# Patient Record
Sex: MALE | Race: BLACK OR AFRICAN AMERICAN | Employment: FULL TIME | URBAN - METROPOLITAN AREA
[De-identification: names, ages, dates, MRNs, and addresses within clinical notes are randomized per-mention and may not be internally consistent; named-entity substitution may affect disease eponyms.]

---

## 2018-09-28 PROBLEM — E66.01 SEVERE OBESITY (BMI 35.0-39.9): Status: ACTIVE | Noted: 2018-09-28

## 2020-01-23 ENCOUNTER — HOSPITAL ENCOUNTER (OUTPATIENT)
Dept: GENERAL RADIOLOGY | Age: 65
Discharge: HOME | End: 2020-01-23
Payer: COMMERCIAL

## 2020-01-23 DIAGNOSIS — R68.3 CLUBBING OF FINGERS: ICD-10-CM

## 2020-01-23 DIAGNOSIS — Z87.891 HISTORY OF SMOKING: ICD-10-CM

## 2020-01-23 PROCEDURE — 71046 X-RAY EXAM CHEST 2 VIEWS: CPT

## 2020-01-24 NOTE — PROGRESS NOTES
Talked to pt and informed him, per Dr. Ariela Beard, some abnormalities noted on CXR; schedule ov to discuss.   Pt is scheduled for an appt w/ Dr. Ariela Beard Thursday, January 30th @ 3:20pm.

## 2020-01-30 PROBLEM — R68.3 CLUBBING OF FINGERS: Status: ACTIVE | Noted: 2020-01-30

## 2020-01-30 PROBLEM — Z87.891 HISTORY OF SMOKING 25-50 PACK YEARS: Status: ACTIVE | Noted: 2020-01-30

## 2020-02-06 ENCOUNTER — HOSPITAL ENCOUNTER (OUTPATIENT)
Dept: CT IMAGING | Age: 65
Discharge: HOME | End: 2020-02-06
Attending: INTERNAL MEDICINE
Payer: COMMERCIAL

## 2020-02-06 DIAGNOSIS — Z87.891 HISTORY OF SMOKING 25-50 PACK YEARS: ICD-10-CM

## 2020-02-06 DIAGNOSIS — R68.3 CLUBBING OF FINGERS: ICD-10-CM

## 2020-02-06 DIAGNOSIS — J84.10: ICD-10-CM

## 2020-02-06 LAB — CREAT BLD-MCNC: 1.2 MG/DL (ref 0.8–1.5)

## 2020-02-06 PROCEDURE — 82565 ASSAY OF CREATININE: CPT

## 2020-02-06 PROCEDURE — 71260 CT THORAX DX C+: CPT

## 2020-02-06 PROCEDURE — 74011000258 HC RX REV CODE- 258: Performed by: INTERNAL MEDICINE

## 2020-02-06 PROCEDURE — 74011636320 HC RX REV CODE- 636/320: Performed by: INTERNAL MEDICINE

## 2020-02-06 RX ORDER — SODIUM CHLORIDE 0.9 % (FLUSH) 0.9 %
10 SYRINGE (ML) INJECTION
Status: COMPLETED | OUTPATIENT
Start: 2020-02-06 | End: 2020-02-06

## 2020-02-06 RX ADMIN — Medication 10 ML: at 07:29

## 2020-02-06 RX ADMIN — IOPAMIDOL 80 ML: 755 INJECTION, SOLUTION INTRAVENOUS at 07:29

## 2020-02-06 RX ADMIN — SODIUM CHLORIDE 100 ML: 900 INJECTION, SOLUTION INTRAVENOUS at 07:29

## 2020-02-06 NOTE — PROGRESS NOTES
CT chest revealed extensive bronchiectasis, groundglass opacities; he has upcoming appointment with Dr. Forest Abbott, Pulmonary 2/22/20, and needs to keep that appointment.

## 2020-02-20 ENCOUNTER — HOSPITAL ENCOUNTER (OUTPATIENT)
Dept: LAB | Age: 65
Discharge: HOME | End: 2020-02-20
Payer: COMMERCIAL

## 2020-02-20 DIAGNOSIS — J84.9 ILD (INTERSTITIAL LUNG DISEASE) (HCC): ICD-10-CM

## 2020-02-20 LAB
CK SERPL-CCNC: 169 U/L (ref 21–215)
ERYTHROCYTE [SEDIMENTATION RATE] IN BLOOD: 13 MM/HR (ref 0–20)

## 2020-02-20 PROCEDURE — 86606 ASPERGILLUS ANTIBODY: CPT

## 2020-02-20 PROCEDURE — 83520 IMMUNOASSAY QUANT NOS NONAB: CPT

## 2020-02-20 PROCEDURE — 86235 NUCLEAR ANTIGEN ANTIBODY: CPT

## 2020-02-20 PROCEDURE — 86430 RHEUMATOID FACTOR TEST QUAL: CPT

## 2020-02-20 PROCEDURE — 82550 ASSAY OF CK (CPK): CPT

## 2020-02-20 PROCEDURE — 86431 RHEUMATOID FACTOR QUANT: CPT

## 2020-02-20 PROCEDURE — 36415 COLL VENOUS BLD VENIPUNCTURE: CPT

## 2020-02-20 PROCEDURE — 86602 ANTINOMYCES ANTIBODY: CPT

## 2020-02-20 PROCEDURE — 86200 CCP ANTIBODY: CPT

## 2020-02-20 PROCEDURE — 85652 RBC SED RATE AUTOMATED: CPT

## 2020-02-21 LAB
CCP IGA+IGG SERPL IA-ACNC: 10 UNITS (ref 0–19)
ENA RNP AB SER-ACNC: 0.2 AI (ref 0–0.9)
ENA SCL70 AB SER-ACNC: <0.2 AI (ref 0–0.9)
ENA SM AB SER-ACNC: <0.2 AI (ref 0–0.9)
ENA SS-A AB SER-ACNC: <0.2 AI (ref 0–0.9)
ENA SS-B AB SER-ACNC: <0.2 AI (ref 0–0.9)
RHEUMATOID FACT SER QL LA: NEGATIVE

## 2020-02-22 LAB
C-ANCA TITR SER IF: NORMAL TITER
MYELOPEROXIDASE AB SER IA-ACNC: <9 U/ML (ref 0–9)
P-ANCA ATYPICAL TITR SER IF: NORMAL TITER
P-ANCA TITR SER IF: NORMAL TITER
PROTEINASE3 AB SER IA-ACNC: <3.5 U/ML (ref 0–3.5)

## 2020-02-24 LAB
A FLAVUS IGE QN: NEGATIVE
A FUMIGATUS # 2 AB, 660126: NEGATIVE
A FUMIGATUS # 3 AB, 660142: NEGATIVE
A FUMIGATUS1 AB SER QL ID: NEGATIVE
A PULLULANS AB SER QL: NEGATIVE
LACEYELLA SACCHARI AB SER QL: NEGATIVE
LACEYELLA SACCHARI AB SER QL: NEGATIVE
PIGEON SERUM AB QL ID: NEGATIVE
S RECTIVIRGULA AB SER QL ID: NEGATIVE
S VIRIDIS AB SER-ACNC: NEGATIVE
T CANDIDUS AB SER QL: NEGATIVE
T VULGARIS AB SER QL ID: NEGATIVE

## 2020-03-06 ENCOUNTER — HOSPITAL ENCOUNTER (OUTPATIENT)
Age: 65
Setting detail: OUTPATIENT SURGERY
Discharge: HOME | End: 2020-03-06
Attending: INTERNAL MEDICINE | Admitting: INTERNAL MEDICINE
Payer: COMMERCIAL

## 2020-03-06 VITALS
SYSTOLIC BLOOD PRESSURE: 138 MMHG | RESPIRATION RATE: 20 BRPM | HEART RATE: 75 BPM | TEMPERATURE: 98.1 F | OXYGEN SATURATION: 93 % | DIASTOLIC BLOOD PRESSURE: 74 MMHG

## 2020-03-06 DIAGNOSIS — R91.8 PULMONARY INFILTRATES: ICD-10-CM

## 2020-03-06 DIAGNOSIS — J84.9 ILD (INTERSTITIAL LUNG DISEASE) (HCC): ICD-10-CM

## 2020-03-06 PROCEDURE — 99153 MOD SED SAME PHYS/QHP EA: CPT | Performed by: INTERNAL MEDICINE

## 2020-03-06 PROCEDURE — 99152 MOD SED SAME PHYS/QHP 5/>YRS: CPT | Performed by: INTERNAL MEDICINE

## 2020-03-06 PROCEDURE — 74011250636 HC RX REV CODE- 250/636: Performed by: ANESTHESIOLOGY

## 2020-03-06 PROCEDURE — 76040000026: Performed by: INTERNAL MEDICINE

## 2020-03-06 PROCEDURE — 31624 DX BRONCHOSCOPE/LAVAGE: CPT | Performed by: INTERNAL MEDICINE

## 2020-03-06 PROCEDURE — 87633 RESP VIRUS 12-25 TARGETS: CPT

## 2020-03-06 PROCEDURE — 77030012699 HC VLV SUC CNTRL OCOA -A: Performed by: INTERNAL MEDICINE

## 2020-03-06 PROCEDURE — 87799 DETECT AGENT NOS DNA QUANT: CPT

## 2020-03-06 PROCEDURE — 87070 CULTURE OTHR SPECIMN AEROBIC: CPT

## 2020-03-06 PROCEDURE — 89051 BODY FLUID CELL COUNT: CPT

## 2020-03-06 PROCEDURE — 87116 MYCOBACTERIA CULTURE: CPT

## 2020-03-06 PROCEDURE — 87102 FUNGUS ISOLATION CULTURE: CPT

## 2020-03-06 PROCEDURE — 74011250636 HC RX REV CODE- 250/636: Performed by: INTERNAL MEDICINE

## 2020-03-06 PROCEDURE — 88112 CYTOPATH CELL ENHANCE TECH: CPT

## 2020-03-06 PROCEDURE — 87486 CHLMYD PNEUM DNA AMP PROBE: CPT

## 2020-03-06 RX ORDER — LIDOCAINE HYDROCHLORIDE 20 MG/ML
JELLY TOPICAL ONCE
Status: DISCONTINUED | OUTPATIENT
Start: 2020-03-06 | End: 2020-03-06 | Stop reason: HOSPADM

## 2020-03-06 RX ORDER — SODIUM CHLORIDE 9 MG/ML
1000 INJECTION, SOLUTION INTRAVENOUS CONTINUOUS
Status: DISCONTINUED | OUTPATIENT
Start: 2020-03-06 | End: 2020-03-06 | Stop reason: HOSPADM

## 2020-03-06 RX ORDER — FENTANYL CITRATE 50 UG/ML
25-100 INJECTION, SOLUTION INTRAMUSCULAR; INTRAVENOUS
Status: DISCONTINUED | OUTPATIENT
Start: 2020-03-06 | End: 2020-03-06 | Stop reason: HOSPADM

## 2020-03-06 RX ORDER — MIDAZOLAM HYDROCHLORIDE 1 MG/ML
.25-5 INJECTION, SOLUTION INTRAMUSCULAR; INTRAVENOUS
Status: DISCONTINUED | OUTPATIENT
Start: 2020-03-06 | End: 2020-03-06 | Stop reason: HOSPADM

## 2020-03-06 RX ORDER — LIDOCAINE HYDROCHLORIDE 40 MG/ML
SOLUTION TOPICAL ONCE
Status: DISCONTINUED | OUTPATIENT
Start: 2020-03-06 | End: 2020-03-06 | Stop reason: HOSPADM

## 2020-03-06 RX ADMIN — MIDAZOLAM 1 MG: 1 INJECTION INTRAMUSCULAR; INTRAVENOUS at 13:12

## 2020-03-06 RX ADMIN — SODIUM CHLORIDE 1000 ML: 900 INJECTION, SOLUTION INTRAVENOUS at 12:18

## 2020-03-06 RX ADMIN — FENTANYL CITRATE 50 MCG: 50 INJECTION, SOLUTION INTRAMUSCULAR; INTRAVENOUS at 13:09

## 2020-03-06 RX ADMIN — FENTANYL CITRATE 50 MCG: 50 INJECTION, SOLUTION INTRAMUSCULAR; INTRAVENOUS at 13:06

## 2020-03-06 RX ADMIN — MIDAZOLAM 2 MG: 1 INJECTION INTRAMUSCULAR; INTRAVENOUS at 13:06

## 2020-03-06 RX ADMIN — FENTANYL CITRATE 50 MCG: 50 INJECTION, SOLUTION INTRAMUSCULAR; INTRAVENOUS at 13:13

## 2020-03-06 NOTE — H&P
Date of Surgery Update:  Tenzin Perea was seen and examined. History and physical has been reviewed. The patient has been examined.  There have been no significant clinical changes since the completion of the originally dated History and Physical.    Signed By: Deondre Prabhakar MD     March 6, 2020 1:09 PM

## 2020-03-06 NOTE — DISCHARGE INSTRUCTIONS
RESPIRATORY CARE - BRONCHOSCOPY - DISCHARGE INSTRUCTIONS      You received a lot of numbing medication for your throat and nose, and you also received medication to make you sleepy during your procedure. Because of this and because the bronchoscopy may have irritated your airways, we ask that you follow these directions:    1. Do not eat or drink until  2:30pm .   After that, you may have what you please. You may want to try some liquids first, because your throat may be a little sore. 2. Medication may cause drowsiness for several hours, therefore:  · Do not drive or operate machinery for remainder of the day. · No alcohol today  · Do not make any important or legal decisions for 24 hours  · Do not sign any legal documents for 24 hours    3. You may cough up more mucus than usual and you may see some blood, but this is expected and should subside by the following day. 4. If severe throat irritation, coughing, or bleeding continue, call your doctor. 5.         If you run a fever greater than 102, call [facility] at [phone]. 6.         Dr. Hernesto Saldana has asked that you:                A. Call the doctor's office for any questions or concerns regarding today's procedure                B. follow up in the office as needed. Discharge Medications:  Resume all normal medications taking caution with pain and sedating medications.        Instructions given to Fiorella Merlos and other family members

## 2020-03-07 LAB
BRONCH. LAVAGE DIFF.,BR: NORMAL
EOSINOPHIL NFR BRONCH MANUAL: 18 %
LYMPHOCYTES NFR BRONCH MANUAL: 25 %
MACROPHAGES NFR BRONCH MANUAL: 0 %
NEUTROPHILS NFR BRONCH MANUAL: 57 %

## 2020-03-09 LAB
BACTERIA SPEC CULT: NORMAL
GRAM STN SPEC: NORMAL
SERVICE CMNT-IMP: NORMAL

## 2020-03-11 LAB
C. PNEUMONAIE, CPPT: NOT DETECTED
M. PNEUMONIAE, MPPT: NOT DETECTED

## 2020-03-15 LAB
FLUAV RNA SPEC QL NAA+PROBE: NEGATIVE
FLUBV RNA SPEC QL NAA+PROBE: NEGATIVE
HADV DNA SPEC QL NAA+PROBE: NEGATIVE
HMPV RNA SPEC QL NAA+PROBE: NEGATIVE
HPIV1 RNA SPEC QL NAA+PROBE: NEGATIVE
HPIV2 RNA SPEC QL NAA+PROBE: NEGATIVE
HPIV3 RNA SPEC QL NAA+PROBE: NEGATIVE
RHINOVIRUS RNA SPEC QL NAA+PROBE: NEGATIVE
RSV A RNA SPEC QL NAA+PROBE: NEGATIVE
RSV B RNA SPEC QL NAA+PROBE: NEGATIVE
SPECIMEN SOURCE: NORMAL

## 2020-04-06 LAB
FUNGUS CULTURE, RFCO2T: NORMAL
FUNGUS SMEAR, RFCO1T: NORMAL
FUNGUS SPEC CULT: NORMAL
FUNGUS STAIN, 188244: NORMAL
REFLEX TO ID, RFCO3T: NORMAL
SPECIMEN SOURCE: NORMAL
SPECIMEN SOURCE: NORMAL

## 2020-04-24 LAB
ACID FAST STN SPEC: NEGATIVE
MYCOBACTERIUM SPEC QL CULT: NEGATIVE
SPECIMEN PREPARATION: NORMAL
SPECIMEN SOURCE: NORMAL

## 2020-09-01 ENCOUNTER — HOSPITAL ENCOUNTER (OUTPATIENT)
Dept: LAB | Age: 65
Discharge: HOME | End: 2020-09-01
Payer: COMMERCIAL

## 2020-09-01 DIAGNOSIS — J84.9 ILD (INTERSTITIAL LUNG DISEASE) (HCC): ICD-10-CM

## 2020-09-01 LAB
ALBUMIN SERPL-MCNC: 3.3 G/DL (ref 3.2–4.6)
ALBUMIN/GLOB SERPL: 0.9 {RATIO} (ref 1.2–3.5)
ALP SERPL-CCNC: 85 U/L (ref 50–136)
ALT SERPL-CCNC: 18 U/L (ref 12–65)
AST SERPL-CCNC: 15 U/L (ref 15–37)
BILIRUB DIRECT SERPL-MCNC: 0.3 MG/DL
BILIRUB SERPL-MCNC: 1.1 MG/DL (ref 0.2–1.1)
GLOBULIN SER CALC-MCNC: 3.7 G/DL (ref 2.3–3.5)
PROT SERPL-MCNC: 7 G/DL (ref 6.3–8.2)

## 2020-09-01 PROCEDURE — 80076 HEPATIC FUNCTION PANEL: CPT

## 2020-09-01 PROCEDURE — 36415 COLL VENOUS BLD VENIPUNCTURE: CPT

## 2020-10-16 ENCOUNTER — HOSPITAL ENCOUNTER (OUTPATIENT)
Dept: LAB | Age: 65
Discharge: HOME | End: 2020-10-16
Payer: COMMERCIAL

## 2020-10-16 DIAGNOSIS — Z79.899 MEDICATION MANAGEMENT: ICD-10-CM

## 2020-10-16 DIAGNOSIS — J84.9 ILD (INTERSTITIAL LUNG DISEASE) (HCC): ICD-10-CM

## 2020-10-16 LAB
ALBUMIN SERPL-MCNC: 3.6 G/DL (ref 3.2–4.6)
ALBUMIN/GLOB SERPL: 1 {RATIO} (ref 1.2–3.5)
ALP SERPL-CCNC: 93 U/L (ref 50–136)
ALT SERPL-CCNC: 23 U/L (ref 12–65)
AST SERPL-CCNC: 15 U/L (ref 15–37)
BILIRUB DIRECT SERPL-MCNC: 0.3 MG/DL
BILIRUB SERPL-MCNC: 1.2 MG/DL (ref 0.2–1.1)
GLOBULIN SER CALC-MCNC: 3.7 G/DL (ref 2.3–3.5)
PROT SERPL-MCNC: 7.3 G/DL (ref 6.3–8.2)

## 2020-10-16 PROCEDURE — 36415 COLL VENOUS BLD VENIPUNCTURE: CPT

## 2020-10-16 PROCEDURE — 80076 HEPATIC FUNCTION PANEL: CPT

## 2020-11-18 ENCOUNTER — HOSPITAL ENCOUNTER (OUTPATIENT)
Dept: LAB | Age: 65
Discharge: HOME | End: 2020-11-18
Payer: COMMERCIAL

## 2020-11-18 DIAGNOSIS — J84.9 ILD (INTERSTITIAL LUNG DISEASE) (HCC): ICD-10-CM

## 2020-11-18 DIAGNOSIS — Z79.899 MEDICATION MANAGEMENT: ICD-10-CM

## 2020-11-18 LAB
ALBUMIN SERPL-MCNC: 3.4 G/DL (ref 3.2–4.6)
ALBUMIN/GLOB SERPL: 0.9 {RATIO} (ref 1.2–3.5)
ALP SERPL-CCNC: 86 U/L (ref 50–136)
ALT SERPL-CCNC: 18 U/L (ref 12–65)
AST SERPL-CCNC: 15 U/L (ref 15–37)
BILIRUB DIRECT SERPL-MCNC: 0.3 MG/DL
BILIRUB SERPL-MCNC: 1.1 MG/DL (ref 0.2–1.1)
GLOBULIN SER CALC-MCNC: 3.7 G/DL (ref 2.3–3.5)
PROT SERPL-MCNC: 7.1 G/DL (ref 6.3–8.2)

## 2020-11-18 PROCEDURE — 36415 COLL VENOUS BLD VENIPUNCTURE: CPT

## 2020-11-18 PROCEDURE — 80076 HEPATIC FUNCTION PANEL: CPT

## 2021-01-20 ENCOUNTER — HOSPITAL ENCOUNTER (OUTPATIENT)
Dept: LAB | Age: 66
Discharge: HOME | End: 2021-01-20
Payer: COMMERCIAL

## 2021-01-20 DIAGNOSIS — J47.9 BRONCHIECTASIS WITHOUT COMPLICATION (HCC): ICD-10-CM

## 2021-01-20 DIAGNOSIS — R68.3 CLUBBING OF NAILS: ICD-10-CM

## 2021-01-20 DIAGNOSIS — J84.9 ILD (INTERSTITIAL LUNG DISEASE) (HCC): ICD-10-CM

## 2021-01-20 LAB
ALBUMIN SERPL-MCNC: 3.6 G/DL (ref 3.2–4.6)
ALBUMIN/GLOB SERPL: 0.9 {RATIO} (ref 1.2–3.5)
ALP SERPL-CCNC: 93 U/L (ref 50–136)
ALT SERPL-CCNC: 19 U/L (ref 12–65)
ANION GAP SERPL CALC-SCNC: 4 MMOL/L (ref 7–16)
AST SERPL-CCNC: 14 U/L (ref 15–37)
BILIRUB SERPL-MCNC: 1 MG/DL (ref 0.2–1.1)
BUN SERPL-MCNC: 9 MG/DL (ref 8–23)
CALCIUM SERPL-MCNC: 9.4 MG/DL (ref 8.3–10.4)
CHLORIDE SERPL-SCNC: 106 MMOL/L (ref 98–107)
CO2 SERPL-SCNC: 32 MMOL/L (ref 21–32)
CREAT SERPL-MCNC: 1.35 MG/DL (ref 0.8–1.5)
GLOBULIN SER CALC-MCNC: 3.8 G/DL (ref 2.3–3.5)
GLUCOSE SERPL-MCNC: 88 MG/DL (ref 65–100)
POTASSIUM SERPL-SCNC: 3.7 MMOL/L (ref 3.5–5.1)
PROT SERPL-MCNC: 7.4 G/DL (ref 6.3–8.2)
SODIUM SERPL-SCNC: 142 MMOL/L (ref 136–145)

## 2021-01-20 PROCEDURE — 36415 COLL VENOUS BLD VENIPUNCTURE: CPT

## 2021-01-20 PROCEDURE — 80053 COMPREHEN METABOLIC PANEL: CPT

## 2021-03-18 ENCOUNTER — HOSPITAL ENCOUNTER (OUTPATIENT)
Dept: LAB | Age: 66
Discharge: HOME | End: 2021-03-18
Payer: COMMERCIAL

## 2021-03-18 DIAGNOSIS — J84.9 ILD (INTERSTITIAL LUNG DISEASE) (HCC): ICD-10-CM

## 2021-03-18 DIAGNOSIS — Z79.899 MEDICATION MANAGEMENT: ICD-10-CM

## 2021-03-18 LAB
ALBUMIN SERPL-MCNC: 3.2 G/DL (ref 3.2–4.6)
ALBUMIN/GLOB SERPL: 0.9 {RATIO} (ref 1.2–3.5)
ALP SERPL-CCNC: 93 U/L (ref 50–136)
ALT SERPL-CCNC: 22 U/L (ref 12–65)
AST SERPL-CCNC: 13 U/L (ref 15–37)
BILIRUB DIRECT SERPL-MCNC: 0.2 MG/DL
BILIRUB SERPL-MCNC: 0.9 MG/DL (ref 0.2–1.1)
GLOBULIN SER CALC-MCNC: 3.7 G/DL (ref 2.3–3.5)
PROT SERPL-MCNC: 6.9 G/DL (ref 6.3–8.2)

## 2021-03-18 PROCEDURE — 36415 COLL VENOUS BLD VENIPUNCTURE: CPT

## 2021-03-18 PROCEDURE — 80076 HEPATIC FUNCTION PANEL: CPT

## 2021-03-26 ENCOUNTER — HOSPITAL ENCOUNTER (OUTPATIENT)
Dept: CT IMAGING | Age: 66
Discharge: HOME | End: 2021-03-26
Attending: INTERNAL MEDICINE
Payer: COMMERCIAL

## 2021-03-26 DIAGNOSIS — J84.9 ILD (INTERSTITIAL LUNG DISEASE) (HCC): ICD-10-CM

## 2021-03-26 PROCEDURE — 71250 CT THORAX DX C-: CPT

## 2021-07-13 ENCOUNTER — HOSPITAL ENCOUNTER (OUTPATIENT)
Dept: LAB | Age: 66
Discharge: HOME | End: 2021-07-13
Payer: COMMERCIAL

## 2021-07-13 DIAGNOSIS — E78.2 MIXED HYPERLIPIDEMIA: ICD-10-CM

## 2021-07-13 DIAGNOSIS — R35.1 NOCTURIA: ICD-10-CM

## 2021-07-13 DIAGNOSIS — E55.9 VITAMIN D DEFICIENCY: ICD-10-CM

## 2021-07-13 DIAGNOSIS — I10 BENIGN ESSENTIAL HYPERTENSION: ICD-10-CM

## 2021-07-13 LAB
25(OH)D3 SERPL-MCNC: 81.1 NG/ML (ref 30–100)
ALBUMIN SERPL-MCNC: 3.7 G/DL (ref 3.2–4.6)
ALBUMIN/GLOB SERPL: 1 {RATIO} (ref 1.2–3.5)
ALP SERPL-CCNC: 93 U/L (ref 50–136)
ALT SERPL-CCNC: 27 U/L (ref 12–65)
ANION GAP SERPL CALC-SCNC: 4 MMOL/L (ref 7–16)
APPEARANCE UR: CLEAR
AST SERPL-CCNC: 15 U/L (ref 15–37)
BASOPHILS # BLD: 0.1 K/UL (ref 0–0.2)
BASOPHILS NFR BLD: 1 % (ref 0–2)
BILIRUB SERPL-MCNC: 0.9 MG/DL (ref 0.2–1.1)
BILIRUB UR QL: NEGATIVE
BUN SERPL-MCNC: 12 MG/DL (ref 8–23)
CALCIUM SERPL-MCNC: 8.8 MG/DL (ref 8.3–10.4)
CHLORIDE SERPL-SCNC: 107 MMOL/L (ref 98–107)
CHOLEST SERPL-MCNC: 154 MG/DL
CO2 SERPL-SCNC: 30 MMOL/L (ref 21–32)
COLOR UR: YELLOW
CREAT SERPL-MCNC: 1.3 MG/DL (ref 0.8–1.5)
DIFFERENTIAL METHOD BLD: ABNORMAL
EOSINOPHIL # BLD: 0.1 K/UL (ref 0–0.8)
EOSINOPHIL NFR BLD: 1 % (ref 0.5–7.8)
ERYTHROCYTE [DISTWIDTH] IN BLOOD BY AUTOMATED COUNT: 12.8 % (ref 11.9–14.6)
GLOBULIN SER CALC-MCNC: 3.8 G/DL (ref 2.3–3.5)
GLUCOSE SERPL-MCNC: 81 MG/DL (ref 65–100)
GLUCOSE UR STRIP.AUTO-MCNC: NEGATIVE MG/DL
HCT VFR BLD AUTO: 53.8 % (ref 41.1–50.3)
HDLC SERPL-MCNC: 60 MG/DL (ref 40–60)
HDLC SERPL: 2.6 {RATIO}
HGB BLD-MCNC: 17.5 G/DL (ref 13.6–17.2)
HGB UR QL STRIP: NEGATIVE
IMM GRANULOCYTES # BLD AUTO: 0 K/UL (ref 0–0.5)
IMM GRANULOCYTES NFR BLD AUTO: 0 % (ref 0–5)
KETONES UR QL STRIP.AUTO: NEGATIVE MG/DL
LDLC SERPL CALC-MCNC: 78.6 MG/DL
LEUKOCYTE ESTERASE UR QL STRIP.AUTO: NEGATIVE
LYMPHOCYTES # BLD: 2.8 K/UL (ref 0.5–4.6)
LYMPHOCYTES NFR BLD: 32 % (ref 13–44)
MCH RBC QN AUTO: 32.2 PG (ref 26.1–32.9)
MCHC RBC AUTO-ENTMCNC: 32.5 G/DL (ref 31.4–35)
MCV RBC AUTO: 99.1 FL (ref 79.6–97.8)
MONOCYTES # BLD: 0.7 K/UL (ref 0.1–1.3)
MONOCYTES NFR BLD: 8 % (ref 4–12)
NEUTS SEG # BLD: 5 K/UL (ref 1.7–8.2)
NEUTS SEG NFR BLD: 58 % (ref 43–78)
NITRITE UR QL STRIP.AUTO: NEGATIVE
NRBC # BLD: 0 K/UL (ref 0–0.2)
PH UR STRIP: 6.5 [PH] (ref 5–9)
PLATELET # BLD AUTO: 215 K/UL (ref 150–450)
PMV BLD AUTO: 9.8 FL (ref 9.4–12.3)
POTASSIUM SERPL-SCNC: 4.1 MMOL/L (ref 3.5–5.1)
PROT SERPL-MCNC: 7.5 G/DL (ref 6.3–8.2)
PROT UR STRIP-MCNC: NEGATIVE MG/DL
PSA SERPL-MCNC: 1 NG/ML
RBC # BLD AUTO: 5.43 M/UL (ref 4.23–5.6)
SODIUM SERPL-SCNC: 141 MMOL/L (ref 136–145)
SP GR UR REFRACTOMETRY: 1.02 (ref 1–1.02)
T4 FREE SERPL-MCNC: 1 NG/DL (ref 0.9–1.8)
TRIGL SERPL-MCNC: 77 MG/DL (ref 35–150)
TSH SERPL DL<=0.005 MIU/L-ACNC: 1.14 UIU/ML (ref 0.36–3.74)
UROBILINOGEN UR QL STRIP.AUTO: 1 EU/DL (ref 0.2–1)
VLDLC SERPL CALC-MCNC: 15.4 MG/DL (ref 6–23)
WBC # BLD AUTO: 8.7 K/UL (ref 4.3–11.1)

## 2021-07-13 PROCEDURE — 36415 COLL VENOUS BLD VENIPUNCTURE: CPT

## 2021-07-13 PROCEDURE — 81003 URINALYSIS AUTO W/O SCOPE: CPT

## 2021-07-13 PROCEDURE — 80061 LIPID PANEL: CPT

## 2021-07-13 PROCEDURE — 84153 ASSAY OF PSA TOTAL: CPT

## 2021-07-13 PROCEDURE — 80053 COMPREHEN METABOLIC PANEL: CPT

## 2021-07-13 PROCEDURE — 84443 ASSAY THYROID STIM HORMONE: CPT

## 2021-07-13 PROCEDURE — 84439 ASSAY OF FREE THYROXINE: CPT

## 2021-07-13 PROCEDURE — 85025 COMPLETE CBC W/AUTO DIFF WBC: CPT

## 2021-07-13 PROCEDURE — 82306 VITAMIN D 25 HYDROXY: CPT

## 2021-09-07 ENCOUNTER — HOSPITAL ENCOUNTER (OUTPATIENT)
Dept: LAB | Age: 66
Discharge: HOME | End: 2021-09-07
Payer: COMMERCIAL

## 2021-09-07 PROBLEM — J47.9 BRONCHIECTASIS WITHOUT COMPLICATION (HCC): Status: ACTIVE | Noted: 2021-09-07

## 2021-09-07 PROBLEM — R06.00 DYSPNEA: Status: ACTIVE | Noted: 2021-09-07

## 2021-09-07 LAB
25(OH)D3 SERPL-MCNC: 76.9 NG/ML (ref 30–100)
ALBUMIN SERPL-MCNC: 3.4 G/DL (ref 3.2–4.6)
ALBUMIN/GLOB SERPL: 0.8 {RATIO} (ref 1.2–3.5)
ALP SERPL-CCNC: 78 U/L (ref 50–136)
ALT SERPL-CCNC: 17 U/L (ref 12–65)
ANION GAP SERPL CALC-SCNC: 8 MMOL/L (ref 7–16)
APPEARANCE UR: CLEAR
AST SERPL-CCNC: 12 U/L (ref 15–37)
BACTERIA URNS QL MICRO: 0 /HPF
BASOPHILS # BLD: 0.1 K/UL (ref 0–0.2)
BASOPHILS NFR BLD: 1 % (ref 0–2)
BILIRUB SERPL-MCNC: 1.2 MG/DL (ref 0.2–1.1)
BILIRUB UR QL: ABNORMAL
BUN SERPL-MCNC: 8 MG/DL (ref 8–23)
CALCIUM SERPL-MCNC: 9.3 MG/DL (ref 8.3–10.4)
CASTS URNS QL MICRO: ABNORMAL /LPF
CHLORIDE SERPL-SCNC: 106 MMOL/L (ref 98–107)
CHOLEST SERPL-MCNC: 141 MG/DL
CO2 SERPL-SCNC: 28 MMOL/L (ref 21–32)
COLOR UR: ABNORMAL
CREAT SERPL-MCNC: 1.38 MG/DL (ref 0.8–1.5)
DIFFERENTIAL METHOD BLD: ABNORMAL
EOSINOPHIL # BLD: 0.1 K/UL (ref 0–0.8)
EOSINOPHIL NFR BLD: 1 % (ref 0.5–7.8)
EPI CELLS #/AREA URNS HPF: ABNORMAL /HPF
ERYTHROCYTE [DISTWIDTH] IN BLOOD BY AUTOMATED COUNT: 12.7 % (ref 11.9–14.6)
GLOBULIN SER CALC-MCNC: 4.2 G/DL (ref 2.3–3.5)
GLUCOSE SERPL-MCNC: 103 MG/DL (ref 65–100)
GLUCOSE UR STRIP.AUTO-MCNC: NEGATIVE MG/DL
HCT VFR BLD AUTO: 54.4 % (ref 41.1–50.3)
HDLC SERPL-MCNC: 52 MG/DL (ref 40–60)
HDLC SERPL: 2.7 {RATIO}
HGB BLD-MCNC: 17.5 G/DL (ref 13.6–17.2)
HGB UR QL STRIP: NEGATIVE
IMM GRANULOCYTES # BLD AUTO: 0 K/UL (ref 0–0.5)
IMM GRANULOCYTES NFR BLD AUTO: 0 % (ref 0–5)
KETONES UR QL STRIP.AUTO: ABNORMAL MG/DL
LDLC SERPL CALC-MCNC: 76.8 MG/DL
LEUKOCYTE ESTERASE UR QL STRIP.AUTO: NEGATIVE
LYMPHOCYTES # BLD: 3 K/UL (ref 0.5–4.6)
LYMPHOCYTES NFR BLD: 34 % (ref 13–44)
MCH RBC QN AUTO: 31.8 PG (ref 26.1–32.9)
MCHC RBC AUTO-ENTMCNC: 32.2 G/DL (ref 31.4–35)
MCV RBC AUTO: 98.9 FL (ref 79.6–97.8)
MONOCYTES # BLD: 0.7 K/UL (ref 0.1–1.3)
MONOCYTES NFR BLD: 7 % (ref 4–12)
NEUTS SEG # BLD: 5 K/UL (ref 1.7–8.2)
NEUTS SEG NFR BLD: 57 % (ref 43–78)
NITRITE UR QL STRIP.AUTO: NEGATIVE
NRBC # BLD: 0 K/UL (ref 0–0.2)
PH UR STRIP: 6 [PH] (ref 5–9)
PLATELET # BLD AUTO: 223 K/UL (ref 150–450)
PMV BLD AUTO: 9.5 FL (ref 9.4–12.3)
POTASSIUM SERPL-SCNC: 4.1 MMOL/L (ref 3.5–5.1)
PROT SERPL-MCNC: 7.6 G/DL (ref 6.3–8.2)
PROT UR STRIP-MCNC: ABNORMAL MG/DL
PSA SERPL-MCNC: 1.2 NG/ML
RBC # BLD AUTO: 5.5 M/UL (ref 4.23–5.6)
RBC #/AREA URNS HPF: ABNORMAL /HPF
SODIUM SERPL-SCNC: 142 MMOL/L (ref 138–145)
SP GR UR REFRACTOMETRY: 1.02 (ref 1–1.02)
T4 FREE SERPL-MCNC: 1.1 NG/DL (ref 0.78–1.46)
TRIGL SERPL-MCNC: 61 MG/DL (ref 35–150)
TSH SERPL DL<=0.005 MIU/L-ACNC: 0.81 UIU/ML (ref 0.36–3.74)
UROBILINOGEN UR QL STRIP.AUTO: 2 EU/DL (ref 0.2–1)
VLDLC SERPL CALC-MCNC: 12.2 MG/DL (ref 6–23)
WBC # BLD AUTO: 8.8 K/UL (ref 4.3–11.1)
WBC URNS QL MICRO: ABNORMAL /HPF

## 2021-09-07 PROCEDURE — 82306 VITAMIN D 25 HYDROXY: CPT

## 2021-09-07 PROCEDURE — 84153 ASSAY OF PSA TOTAL: CPT

## 2021-09-07 PROCEDURE — 84443 ASSAY THYROID STIM HORMONE: CPT

## 2021-09-07 PROCEDURE — 84439 ASSAY OF FREE THYROXINE: CPT

## 2021-09-07 PROCEDURE — 81003 URINALYSIS AUTO W/O SCOPE: CPT

## 2021-09-07 PROCEDURE — 36415 COLL VENOUS BLD VENIPUNCTURE: CPT

## 2021-09-07 PROCEDURE — 80061 LIPID PANEL: CPT

## 2021-09-07 PROCEDURE — 85025 COMPLETE CBC W/AUTO DIFF WBC: CPT

## 2021-09-07 PROCEDURE — 80053 COMPREHEN METABOLIC PANEL: CPT

## 2021-09-14 NOTE — PROGRESS NOTES
Talked to pt and informed him, per Dr. Ric Hamlin, CT chest revealed extensive bronchiectasis, groundglass opacities; he has upcoming appointment with Dr. Kenneth Felder, Pulmonary 2/20/20, and needs to keep that appointment. You did very well today. Dr. Diaz was able to remove the bladder stone and treat the prostate.  Blood in the urine is normal. If the fong is not draining and there are clots, go to the ER.  Remove the catheter on Thursday if urine is pink, yellow, or clear. The nurse will show you how to do this before you go home from the hospital.  Call [phone] to obtain outpatient  follow up appointment with Dr. Diaz.      Anesthesia: General Anesthesia     You are watched continuously during your procedure by your anesthesia provider.   You’re due to have surgery. During surgery, you’ll be given medicine called anesthesia or anesthetic. This will keep you comfortable and pain-free. Your anesthesia provider will use general anesthesia .  What is general anesthesia?  General anesthesia puts you into a state like deep sleep. It goes into the bloodstream (IV anesthetics), into the lungs (gas anesthetics), or both. You feel nothing during the procedure. You will not remember it. During the procedure, the anesthesia provider monitors you continuously. He or she checks your heart rate and rhythm, blood pressure, breathing, and blood oxygen.   · IV anesthetics. IV anesthetics are given through an IV line in your arm. They’re often given first. This is so you are asleep before a gas anesthetic is started. Some kinds of IV anesthetics relieve pain. Others relax you. Your doctor will decide which kind is best in your case.  · Gas anesthetics. Gas anesthetics are breathed into the lungs. They are often used to keep you asleep. They can be given through a face mask or a tube placed in your larynx or trachea (breathing tube) .  ? If you have a face mask, your anesthesia provider will most likely place it over your nose and mouth while you’re still awake. You’ll breathe oxygen through the mask as your IV anesthetic is started. Gas anesthetic may be added through the mask.  ? If you have a tube in the larynx or trachea, it will be  inserted into your throat after you’re asleep.  Anesthesia tools and medicines  You will likely have:  · IV anesthetics. These are put into an IV line into your bloodstream.  · Gas anesthetics. You breathe these anesthetics into your lungs, where they pass into your bloodstream.  · Pulse oximeter. This is a small clip that is attached to the end of your finger. This measures your blood oxygen level.  · Electrocardiography leads (electrodes). These are small sticky pads that are placed on your chest. They record your heart rate and rhythm.  · Blood pressure cuff. This reads your blood pressure.  Risks and possible complications  General anesthesia has some risks. These include:  · Breathing problems  · Nausea and vomiting  · Sore throat or hoarseness (usually temporary)  · Allergic reaction to the anesthetic  · Irregular heartbeat (rare)  · Cardiac arrest (rare)  Anesthesia safety  · Follow all instructions you are given for how long not to eat or drink before your procedure.  · Be sure your doctor knows what medicines and drugs you take. This includes over-the-counter medicines, herbs, supplements, alcohol or other drugs. You will be asked when those were last taken.  · Have an adult family member or friend drive you home after the procedure.  · For the first 24 hours after your surgery:  ? Don't drive or use heavy equipment.  ? Don't make important decisions or sign legal documents. If important decisions or signing legal documents is necessary during the first 24 hours after surgery, have a trusted family member or spouse act on your behalf.  ? Don't drink alcohol.  ? Have a responsible adult stay with you. He or she can watch for problems and help keep you safe.    [employer] last reviewed this educational content on 10/1/2019  © 9371-8900 [employer], [employer]. [address] [place] [address], [place] [address]. All rights reserved. This information is not intended as a substitute for professional medical care. Always  follow your healthcare professional's instructions.        Step-by-Step  Washing Your Hands    Date Last Reviewed: 10/1/2017  © 8488-2582 [employer]. [address] [facility], [place] [address]. All rights reserved. This information is not intended as a substitute for professional medical care. Always follow your healthcare professional's instructions.        Discharge Instructions: After Your Surgery  You’ve just had surgery. During surgery, you were given medicine called anesthesia to keep you relaxed and free of pain. After surgery, you may have some pain or nausea. This is common. Here are some tips for feeling better and getting well after surgery.     Stay on schedule with your medicine.   Going home  Your healthcare provider will show you how to take care of yourself when you go home. He or she will also answer your questions. Have an adult family member or friend drive you home. For the first 24 hours after your surgery:  · Don't drive or use heavy equipment.  · Don't make important decisions or sign legal papers.  · Don't drink alcohol.  · Have someone stay with you, if needed. He or she can watch for problems and help keep you safe.  Be sure to go to all follow-up visits with your healthcare provider. And rest after your surgery for as long as your healthcare provider tells you to.  Coping with pain  If you have pain after surgery, pain medicine will help you feel better. Take it as told, before pain becomes severe. Also, ask your healthcare provider or pharmacist about other ways to control pain. This might be with heat, ice, or relaxation. And follow any other instructions your surgeon or nurse gives you.  Tips for taking pain medicine  To get the best relief possible, remember these points:  · Pain medicines can upset your stomach. Taking them with a little food may help.  · Most pain relievers taken by mouth need at least 20 to 30 minutes to start to work.  · Don't wait till your pain  becomes severe before you take your medicine. Try to time your medicine so that you can take it before starting an activity. This might be before you get dressed, go for a walk, or sit down for dinner.  · Constipation is a common side effect of pain medicines. Call your healthcare provider before taking any medicines such as laxatives or stool softeners to help ease constipation. Also ask if you should skip any foods. Drinking lots of fluids and eating foods such as fruits and vegetables that are high in fiber can also help. Remember, don't take laxatives unless your surgeon has prescribed them.  · Drinking alcohol and taking pain medicine can cause dizziness and slow your breathing. It can even be deadly. Don't drink alcohol while taking pain medicine.  · Pain medicine can make you react more slowly to things. Don't drive or run machinery while taking pain medicine.  Your healthcare provider may tell you to take acetaminophen to help ease your pain. Ask him or her how much you are supposed to take each day. Acetaminophen or other pain relievers may interact with your prescription medicines or other over-the-counter (OTC) medicines. Some prescription medicines have acetaminophen and other ingredients. Using both prescription and OTC acetaminophen for pain can cause you to overdose. Read the labels on your OTC medicines with care. This will help you to clearly know the list of ingredients, how much to take, and any warnings. It may also help you not take too much acetaminophen. If you have questions or don't understand the information, ask your pharmacist or healthcare provider to explain it to you before you take the OTC medicine.  Managing nausea  Some people have an upset stomach after surgery. This is often because of anesthesia, pain, or pain medicine, or the stress of surgery. These tips will help you handle nausea and eat healthy foods as you get better. If you were on a special food plan before surgery, ask  your healthcare provider if you should follow it while you get better. These tips may help:  · Don't push yourself to eat. Your body will tell you when to eat and how much.  · Start off with clear liquids and soup. They are easier to digest.  · Next try semi-solid foods, such as mashed potatoes, applesauce, and gelatin, as you feel ready.  · Slowly move to solid foods. Don’t eat fatty, rich, or spicy foods at first.  · Don't force yourself to have 3 large meals a day. Instead eat smaller amounts more often.  · Take pain medicines with a small amount of solid food, such as crackers or toast, to prevent nausea.  When to call your healthcare provider  Call your healthcare provider if:  · You still have intolerable pain an hour after taking medicine. The medicine may not be strong enough.  · You feel too sleepy, dizzy, or groggy. The medicine may be too strong.  · You have side effects such as nausea or vomiting, or skin changes such as rash, itching, or hives. Your healthcare provider may suggest other medicines to control side effects.  Rash, itching, or hives may mean you have an allergic reaction. Report this right away. If you have trouble breathing or facial swelling, call 911 right away.  If you have obstructive sleep apnea  You were given anesthesia medicine during surgery to keep you comfortable and free of pain. After surgery, you may have more apnea spells because of this medicine and other medicines you were given. The spells may last longer than usual.   At home:  · Keep using the continuous positive airway pressure (CPAP) device when you sleep. Unless your healthcare provider tells you not to, use it when you sleep, day or night. CPAP is a common device used to treat obstructive sleep apnea.  · Talk with your provider before taking any pain medicine, muscle relaxants, or sedatives. Your provider will tell you about the possible dangers of taking these medicines.  [employer] last reviewed this educational  content on 3/1/2019  © 0546-7466 [employer], [employer]. [address] [place] [address], [place] [address]. All rights reserved. This information is not intended as a substitute for professional medical care. Always follow your healthcare professional's instructions.      Ramirez Catheter Care     A Ramirez catheter is a rubber tube that is placed through the urethra and into the bladder. The urethra is the opening where urine comes out. The catheter helps drain urine from the bladder. There is a small balloon on the end of the tube that is inflated after the catheter is put in place. This keeps the catheter from sliding out of the bladder.  A Ramirez catheter is used when you are unable to pass urine (urinary retention). It's also used when there is loss of bladder control (incontinence).  Home care  · Finish taking any prescribed antibiotic medicine even if you are feeling better before then.  · It's important to keep bacteria from getting into the collection bag. Don't disconnect the catheter from the collection bag.  · Use a leg band to secure the drainage tube, so it doesn't pull on the catheter. Drain the collection bag when it becomes full using the drain spout at the bottom of the bag.  · Don't try to pull or remove your catheter. This will injure your urethra. It must be removed by your healthcare provider or nurse.    Follow-up care  Follow up with your healthcare provider, or as advised. This is for repeat urine testing and for catheter removal or replacement.  When to seek medical advice  Call your healthcare provider right away if any of these occur:  · Fever of 100.4ºF (38ºC) or higher, or as directed by your healthcare provider  · Bladder pain or fullness  · Abdominal swelling, nausea or vomiting, or back pain  · Blood or urine leakage around the catheter  · Bloody urine coming from the catheter (if a new symptom)  · Catheter falls out  · Catheter stops draining for 6 hours  · Weakness, dizziness, or  ricki Lugo last reviewed this educational content on 9/1/2019  © 1356-4944 [employer]. All rights reserved. This information is not intended as a substitute for professional medical care. Always follow your healthcare professional's instructions.

## 2021-12-22 ENCOUNTER — HOSPITAL ENCOUNTER (OUTPATIENT)
Dept: LAB | Age: 66
Discharge: HOME | End: 2021-12-22
Payer: COMMERCIAL

## 2021-12-22 DIAGNOSIS — E78.2 MIXED HYPERLIPIDEMIA: ICD-10-CM

## 2021-12-22 LAB
ALBUMIN SERPL-MCNC: 3.3 G/DL (ref 3.2–4.6)
ALBUMIN/GLOB SERPL: 0.9 {RATIO} (ref 1.2–3.5)
ALP SERPL-CCNC: 71 U/L (ref 50–136)
ALT SERPL-CCNC: 33 U/L (ref 12–65)
ANION GAP SERPL CALC-SCNC: 6 MMOL/L (ref 7–16)
AST SERPL-CCNC: 14 U/L (ref 15–37)
BASOPHILS # BLD: 0.1 K/UL (ref 0–0.2)
BASOPHILS NFR BLD: 1 % (ref 0–2)
BILIRUB SERPL-MCNC: 1.5 MG/DL (ref 0.2–1.1)
BUN SERPL-MCNC: 12 MG/DL (ref 8–23)
CALCIUM SERPL-MCNC: 9.2 MG/DL (ref 8.3–10.4)
CHLORIDE SERPL-SCNC: 107 MMOL/L (ref 98–107)
CHOLEST SERPL-MCNC: 133 MG/DL
CO2 SERPL-SCNC: 29 MMOL/L (ref 21–32)
CREAT SERPL-MCNC: 1.55 MG/DL (ref 0.8–1.5)
DIFFERENTIAL METHOD BLD: ABNORMAL
EOSINOPHIL # BLD: 0.1 K/UL (ref 0–0.8)
EOSINOPHIL NFR BLD: 1 % (ref 0.5–7.8)
ERYTHROCYTE [DISTWIDTH] IN BLOOD BY AUTOMATED COUNT: 13.2 % (ref 11.9–14.6)
GLOBULIN SER CALC-MCNC: 3.5 G/DL (ref 2.3–3.5)
GLUCOSE SERPL-MCNC: 87 MG/DL (ref 65–100)
HCT VFR BLD AUTO: 52.8 % (ref 41.1–50.3)
HDLC SERPL-MCNC: 49 MG/DL (ref 40–60)
HDLC SERPL: 2.7 {RATIO}
HGB BLD-MCNC: 17 G/DL (ref 13.6–17.2)
IMM GRANULOCYTES # BLD AUTO: 0 K/UL (ref 0–0.5)
IMM GRANULOCYTES NFR BLD AUTO: 0 % (ref 0–5)
LDLC SERPL CALC-MCNC: 71.8 MG/DL
LYMPHOCYTES # BLD: 2.5 K/UL (ref 0.5–4.6)
LYMPHOCYTES NFR BLD: 34 % (ref 13–44)
MCH RBC QN AUTO: 31.9 PG (ref 26.1–32.9)
MCHC RBC AUTO-ENTMCNC: 32.2 G/DL (ref 31.4–35)
MCV RBC AUTO: 99.1 FL (ref 79.6–97.8)
MONOCYTES # BLD: 0.7 K/UL (ref 0.1–1.3)
MONOCYTES NFR BLD: 9 % (ref 4–12)
NEUTS SEG # BLD: 4.2 K/UL (ref 1.7–8.2)
NEUTS SEG NFR BLD: 56 % (ref 43–78)
NRBC # BLD: 0 K/UL (ref 0–0.2)
PLATELET # BLD AUTO: 211 K/UL (ref 150–450)
PMV BLD AUTO: 9.8 FL (ref 9.4–12.3)
POTASSIUM SERPL-SCNC: 3.9 MMOL/L (ref 3.5–5.1)
PROT SERPL-MCNC: 6.8 G/DL (ref 6.3–8.2)
RBC # BLD AUTO: 5.33 M/UL (ref 4.23–5.6)
SODIUM SERPL-SCNC: 142 MMOL/L (ref 138–145)
TRIGL SERPL-MCNC: 61 MG/DL (ref 35–150)
VLDLC SERPL CALC-MCNC: 12.2 MG/DL (ref 6–23)
WBC # BLD AUTO: 7.5 K/UL (ref 4.3–11.1)

## 2021-12-22 PROCEDURE — 80053 COMPREHEN METABOLIC PANEL: CPT

## 2021-12-22 PROCEDURE — 85025 COMPLETE CBC W/AUTO DIFF WBC: CPT

## 2021-12-22 PROCEDURE — 36415 COLL VENOUS BLD VENIPUNCTURE: CPT

## 2021-12-22 PROCEDURE — 80061 LIPID PANEL: CPT

## 2022-01-01 ENCOUNTER — TELEPHONE (OUTPATIENT)
Dept: PULMONOLOGY | Age: 67
End: 2022-01-01

## 2022-01-01 DIAGNOSIS — R06.02 SHORTNESS OF BREATH: ICD-10-CM

## 2022-01-01 LAB
ALBUMIN SERPL-MCNC: 3.3 G/DL (ref 3.2–4.6)
ALBUMIN/GLOB SERPL: 1 (ref 0.4–1.6)
ALP SERPL-CCNC: 202 U/L (ref 50–136)
ALT SERPL-CCNC: 76 U/L (ref 12–65)
ANION GAP SERPL CALC-SCNC: 13 MMOL/L (ref 2–11)
AST SERPL-CCNC: 77 U/L (ref 15–37)
BASOPHILS # BLD: 0 K/UL (ref 0–0.2)
BASOPHILS NFR BLD: 0 % (ref 0–2)
BILIRUB SERPL-MCNC: 5.5 MG/DL (ref 0.2–1.1)
BUN SERPL-MCNC: 30 MG/DL (ref 8–23)
CALCIUM SERPL-MCNC: 9.8 MG/DL (ref 8.3–10.4)
CHLORIDE SERPL-SCNC: 100 MMOL/L (ref 101–110)
CO2 SERPL-SCNC: 23 MMOL/L (ref 21–32)
CREAT SERPL-MCNC: 2.5 MG/DL (ref 0.8–1.5)
DIFFERENTIAL METHOD BLD: ABNORMAL
EOSINOPHIL # BLD: 0 K/UL (ref 0–0.8)
EOSINOPHIL NFR BLD: 0 % (ref 0.5–7.8)
ERYTHROCYTE [DISTWIDTH] IN BLOOD BY AUTOMATED COUNT: 15.5 % (ref 11.9–14.6)
GLOBULIN SER CALC-MCNC: 3.3 G/DL (ref 2.8–4.5)
GLUCOSE SERPL-MCNC: 67 MG/DL (ref 65–100)
HCT VFR BLD AUTO: 50.6 % (ref 41.1–50.3)
HGB BLD-MCNC: 16.6 G/DL (ref 13.6–17.2)
IMM GRANULOCYTES # BLD AUTO: 0 K/UL (ref 0–0.5)
IMM GRANULOCYTES NFR BLD AUTO: 0 % (ref 0–5)
LYMPHOCYTES # BLD: 1.2 K/UL (ref 0.5–4.6)
LYMPHOCYTES NFR BLD: 19 % (ref 13–44)
MCH RBC QN AUTO: 32.5 PG (ref 26.1–32.9)
MCHC RBC AUTO-ENTMCNC: 32.8 G/DL (ref 31.4–35)
MCV RBC AUTO: 99.2 FL (ref 82–102)
MONOCYTES # BLD: 0.5 K/UL (ref 0.1–1.3)
MONOCYTES NFR BLD: 8 % (ref 4–12)
NEUTS SEG # BLD: 4.7 K/UL (ref 1.7–8.2)
NEUTS SEG NFR BLD: 73 % (ref 43–78)
NRBC # BLD: 0 K/UL (ref 0–0.2)
NT PRO BNP: ABNORMAL PG/ML (ref 5–125)
PLATELET # BLD AUTO: 95 K/UL (ref 150–450)
PMV BLD AUTO: 11.7 FL (ref 9.4–12.3)
POTASSIUM SERPL-SCNC: 4.6 MMOL/L (ref 3.5–5.1)
PROT SERPL-MCNC: 6.6 G/DL (ref 6.3–8.2)
RBC # BLD AUTO: 5.1 M/UL (ref 4.23–5.6)
SODIUM SERPL-SCNC: 136 MMOL/L (ref 133–143)
WBC # BLD AUTO: 6.5 K/UL (ref 4.3–11.1)

## 2022-03-18 PROBLEM — R06.00 DYSPNEA: Status: ACTIVE | Noted: 2021-09-07

## 2022-03-19 PROBLEM — Z87.891 HISTORY OF SMOKING 25-50 PACK YEARS: Status: ACTIVE | Noted: 2020-01-30

## 2022-03-19 PROBLEM — J47.9 BRONCHIECTASIS WITHOUT COMPLICATION (HCC): Status: ACTIVE | Noted: 2021-09-07

## 2022-03-19 PROBLEM — J84.9 ILD (INTERSTITIAL LUNG DISEASE) (HCC): Status: ACTIVE | Noted: 2020-03-06

## 2022-03-19 PROBLEM — R68.3 CLUBBING OF FINGERS: Status: ACTIVE | Noted: 2020-01-30

## 2022-03-19 PROBLEM — E66.01 CLASS 2 SEVERE OBESITY DUE TO EXCESS CALORIES WITH SERIOUS COMORBIDITY AND BODY MASS INDEX (BMI) OF 35.0 TO 35.9 IN ADULT (HCC): Status: ACTIVE | Noted: 2018-09-28

## 2022-03-20 PROBLEM — R91.8 PULMONARY INFILTRATES: Status: ACTIVE | Noted: 2020-03-06

## 2022-03-30 ENCOUNTER — NURSE TRIAGE (OUTPATIENT)
Dept: OTHER | Facility: CLINIC | Age: 67
End: 2022-03-30

## 2022-03-30 NOTE — TELEPHONE ENCOUNTER
Received call from BODØ  at [facility] with [employer]. Subjective: Caller states \" I have some swelling in both of my legs. I went to  - she said it was edema and there was bilirubin in my urine. She suggested that I let my PCP evaluate my legs in 3-4 and maybe get a cardiology consult. \"     [id number] [address] appointment yesterday 3/29/2022   Discharge Instructions  Ciera Aguilar MD - 03/29/2022 6:16 PM EDT    Formatting of this note might be different from the original.  Elevate your legs as much as you are able. Increase Furosemide to 20mg TWICE A DAY for the next 3-4 days. See your doctor in 3-4 days to reevaluate your legs. Consider a cardiology referral if edema does not quickly resolve. Bilirubin was in your urine; please evaluate your liver functions with your primary doctor. Current Symptoms:   +swelling goes from feet to groin area - both swollen equally   +\"I have been short of breath  - I see a pulmonary doctor \" - more short of breath than normal - same since yesterday office visit   -denies chest pain   +hx of HTN   +\"in the last 3-4 days I can barely walk in shoes\"   -denies past swelling     Onset: 2 weeks ago; unchanged    Associated Symptoms: NA    Pain Severity: \"only time I have pain is when I take the pressure of my legs -like when I lay down in bed then it stops\"  -denies redness     Temperature:Denies     What has been tried: elevate legs     Recommended disposition: See HCP within 4 Hours (or PCP triage)-advised pt to follow up in [facility] or ED if unable to get appointment    Called [facility] - no available staff at this time - pt states he is at work and cannot come in for office appointment until after work or requests Сергей Garcia advice provided, patient verbalizes understanding; denies any other questions or concerns; instructed to call back for any new or worsening symptoms.     Patient/Caller agrees with recommended disposition; writer provided warm transfer to [facility] SYS WASECA  at [facility] for appointment scheduling- will sent pt to after hours line to leave message for callback     Attention Provider: Thank you for allowing me to participate in the care of your patient. The patient was connected to triage in response to information provided to the [facility]. Please do not respond through this encounter as the response is not directed to a shared pool.       Reason for Disposition   SEVERE leg swelling (e.g., swelling extends above knee, entire leg is swollen, weeping fluid)    Protocols used: LEG SWELLING AND EDEMA-ADULT-AH

## 2022-04-18 PROBLEM — I42.0 DILATED CARDIOMYOPATHY (HCC): Status: ACTIVE | Noted: 2022-04-18

## 2022-04-18 PROBLEM — I50.9 CHF (CONGESTIVE HEART FAILURE) (HCC): Status: ACTIVE | Noted: 2022-04-18

## 2022-05-02 ENCOUNTER — HOSPITAL ENCOUNTER (OUTPATIENT)
Dept: LAB | Age: 67
Discharge: HOME | End: 2022-05-02
Payer: COMMERCIAL

## 2022-05-02 DIAGNOSIS — I35.1 NONRHEUMATIC AORTIC VALVE INSUFFICIENCY: ICD-10-CM

## 2022-05-02 LAB
ANION GAP SERPL CALC-SCNC: 5 MMOL/L (ref 7–16)
BUN SERPL-MCNC: 18 MG/DL (ref 8–23)
CALCIUM SERPL-MCNC: 9.5 MG/DL (ref 8.3–10.4)
CHLORIDE SERPL-SCNC: 105 MMOL/L (ref 98–107)
CO2 SERPL-SCNC: 30 MMOL/L (ref 21–32)
CREAT SERPL-MCNC: 1.7 MG/DL (ref 0.8–1.5)
ERYTHROCYTE [DISTWIDTH] IN BLOOD BY AUTOMATED COUNT: 13.4 % (ref 11.9–14.6)
GLUCOSE SERPL-MCNC: 87 MG/DL (ref 65–100)
HCT VFR BLD AUTO: 53.3 % (ref 41.1–50.3)
HGB BLD-MCNC: 16.8 G/DL (ref 13.6–17.2)
MCH RBC QN AUTO: 32.2 PG (ref 26.1–32.9)
MCHC RBC AUTO-ENTMCNC: 31.5 G/DL (ref 31.4–35)
MCV RBC AUTO: 102.3 FL (ref 79.6–97.8)
NRBC # BLD: 0 K/UL (ref 0–0.2)
PLATELET # BLD AUTO: 180 K/UL (ref 150–450)
PMV BLD AUTO: 9.9 FL (ref 9.4–12.3)
POTASSIUM SERPL-SCNC: 4.4 MMOL/L (ref 3.5–5.1)
RBC # BLD AUTO: 5.21 M/UL (ref 4.23–5.6)
SODIUM SERPL-SCNC: 140 MMOL/L (ref 136–145)
WBC # BLD AUTO: 6.4 K/UL (ref 4.3–11.1)

## 2022-05-02 PROCEDURE — 85027 COMPLETE CBC AUTOMATED: CPT

## 2022-05-02 PROCEDURE — 80048 BASIC METABOLIC PNL TOTAL CA: CPT

## 2022-05-02 PROCEDURE — 36415 COLL VENOUS BLD VENIPUNCTURE: CPT

## 2022-05-04 NOTE — PROGRESS NOTES
Patient pre-assessment complete for [facility] ALE scheduled for 22, arrival time 0630. Patient verified using . Patient instructed to bring all home medications in labeled bottles on the day of procedure. NPO status reinforced. Patient instructed to HOLD Bumex, Valsartan, Cialis, and Aldactone. Instructed to take 324mg of Aspirin prior to arrival. Patient verbalizes understanding of all instructions & denies any questions at this time. Pt to come in early for IV hydration due to Creatinine of 1.7 and history of CHF.

## 2022-05-05 ENCOUNTER — HOSPITAL ENCOUNTER (OUTPATIENT)
Age: 67
Setting detail: OUTPATIENT SURGERY
Discharge: HOME | End: 2022-05-05
Attending: INTERNAL MEDICINE | Admitting: INTERNAL MEDICINE
Payer: COMMERCIAL

## 2022-05-05 ENCOUNTER — HOSPITAL ENCOUNTER (OUTPATIENT)
Dept: CARDIAC CATH/INVASIVE PROCEDURES | Age: 67
Setting detail: OUTPATIENT SURGERY
Discharge: HOME | End: 2022-05-05
Attending: INTERNAL MEDICINE | Admitting: INTERNAL MEDICINE
Payer: COMMERCIAL

## 2022-05-05 VITALS
SYSTOLIC BLOOD PRESSURE: 127 MMHG | RESPIRATION RATE: 16 BRPM | DIASTOLIC BLOOD PRESSURE: 79 MMHG | HEIGHT: 66 IN | TEMPERATURE: 98.2 F | BODY MASS INDEX: 38.25 KG/M2 | OXYGEN SATURATION: 99 % | HEART RATE: 90 BPM | WEIGHT: 238 LBS

## 2022-05-05 VITALS — HEIGHT: 66 IN | BODY MASS INDEX: 38.25 KG/M2 | WEIGHT: 238 LBS

## 2022-05-05 DIAGNOSIS — I35.1 NONRHEUMATIC AORTIC VALVE INSUFFICIENCY: ICD-10-CM

## 2022-05-05 DIAGNOSIS — I42.0 DILATED CARDIOMYOPATHY (HCC): ICD-10-CM

## 2022-05-05 DIAGNOSIS — I42.9 CARDIOMYOPATHY (HCC): ICD-10-CM

## 2022-05-05 LAB
ACT BLD: 321 SECS (ref 70–128)
ALBUMIN SERPL-MCNC: 3.7 G/DL (ref 3.2–4.6)
ALBUMIN/GLOB SERPL: 0.9 {RATIO} (ref 1.2–3.5)
ALP SERPL-CCNC: 77 U/L (ref 50–136)
ALT SERPL-CCNC: 27 U/L (ref 12–65)
ANION GAP SERPL CALC-SCNC: 1 MMOL/L (ref 7–16)
AST SERPL-CCNC: 25 U/L (ref 15–37)
ATRIAL RATE: 89 BPM
BILIRUB SERPL-MCNC: 2 MG/DL (ref 0.2–1.1)
BUN SERPL-MCNC: 20 MG/DL (ref 8–23)
CALCIUM SERPL-MCNC: 9.5 MG/DL (ref 8.3–10.4)
CALCULATED P AXIS, ECG09: 48 DEGREES
CALCULATED R AXIS, ECG10: -60 DEGREES
CALCULATED T AXIS, ECG11: 44 DEGREES
CHLORIDE SERPL-SCNC: 104 MMOL/L (ref 98–107)
CO2 SERPL-SCNC: 31 MMOL/L (ref 21–32)
CREAT SERPL-MCNC: 1.8 MG/DL (ref 0.8–1.5)
DIAGNOSIS, 93000: NORMAL
GLOBULIN SER CALC-MCNC: 3.9 G/DL (ref 2.3–3.5)
GLUCOSE SERPL-MCNC: 86 MG/DL (ref 65–100)
P-R INTERVAL, ECG05: 202 MS
POTASSIUM SERPL-SCNC: 4.5 MMOL/L (ref 3.5–5.1)
PROT SERPL-MCNC: 7.6 G/DL (ref 6.3–8.2)
Q-T INTERVAL, ECG07: 376 MS
QRS DURATION, ECG06: 108 MS
QTC CALCULATION (BEZET), ECG08: 457 MS
SODIUM SERPL-SCNC: 136 MMOL/L (ref 138–145)
VENTRICULAR RATE, ECG03: 89 BPM

## 2022-05-05 PROCEDURE — C1769 GUIDE WIRE: HCPCS | Performed by: INTERNAL MEDICINE

## 2022-05-05 PROCEDURE — 99153 MOD SED SAME PHYS/QHP EA: CPT | Performed by: INTERNAL MEDICINE

## 2022-05-05 PROCEDURE — C1753 CATH, INTRAVAS ULTRASOUND: HCPCS | Performed by: INTERNAL MEDICINE

## 2022-05-05 PROCEDURE — C1894 INTRO/SHEATH, NON-LASER: HCPCS | Performed by: INTERNAL MEDICINE

## 2022-05-05 PROCEDURE — 93325 DOPPLER ECHO COLOR FLOW MAPG: CPT | Performed by: INTERNAL MEDICINE

## 2022-05-05 PROCEDURE — 99152 MOD SED SAME PHYS/QHP 5/>YRS: CPT | Performed by: INTERNAL MEDICINE

## 2022-05-05 PROCEDURE — 99152 MOD SED SAME PHYS/QHP 5/>YRS: CPT

## 2022-05-05 PROCEDURE — 99153 MOD SED SAME PHYS/QHP EA: CPT

## 2022-05-05 PROCEDURE — C1751 CATH, INF, PER/CENT/MIDLINE: HCPCS | Performed by: INTERNAL MEDICINE

## 2022-05-05 PROCEDURE — 74011250636 HC RX REV CODE- 250/636: Performed by: INTERNAL MEDICINE

## 2022-05-05 PROCEDURE — 74011000250 HC RX REV CODE- 250: Performed by: INTERNAL MEDICINE

## 2022-05-05 PROCEDURE — 77030019569 HC BND COMPR RAD TERU -B: Performed by: INTERNAL MEDICINE

## 2022-05-05 PROCEDURE — 93460 R&L HRT ART/VENTRICLE ANGIO: CPT | Performed by: INTERNAL MEDICINE

## 2022-05-05 PROCEDURE — 85347 COAGULATION TIME ACTIVATED: CPT

## 2022-05-05 PROCEDURE — 74011000636 HC RX REV CODE- 636: Performed by: INTERNAL MEDICINE

## 2022-05-05 PROCEDURE — 93320 DOPPLER ECHO COMPLETE: CPT | Performed by: INTERNAL MEDICINE

## 2022-05-05 PROCEDURE — 99156 MOD SED OTH PHYS/QHP 5/>YRS: CPT

## 2022-05-05 PROCEDURE — 93005 ELECTROCARDIOGRAM TRACING: CPT | Performed by: INTERNAL MEDICINE

## 2022-05-05 PROCEDURE — 92978 ENDOLUMINL IVUS OCT C 1ST: CPT | Performed by: INTERNAL MEDICINE

## 2022-05-05 PROCEDURE — 77030040934 HC CATH DIAG DXTERITY MEDT -A: Performed by: INTERNAL MEDICINE

## 2022-05-05 PROCEDURE — C1887 CATHETER, GUIDING: HCPCS | Performed by: INTERNAL MEDICINE

## 2022-05-05 PROCEDURE — 80053 COMPREHEN METABOLIC PANEL: CPT

## 2022-05-05 PROCEDURE — 93325 DOPPLER ECHO COLOR FLOW MAPG: CPT

## 2022-05-05 PROCEDURE — 99157 MOD SED OTHER PHYS/QHP EA: CPT | Performed by: INTERNAL MEDICINE

## 2022-05-05 PROCEDURE — 93312 ECHO TRANSESOPHAGEAL: CPT | Performed by: INTERNAL MEDICINE

## 2022-05-05 RX ORDER — FENTANYL CITRATE 50 UG/ML
25-50 INJECTION, SOLUTION INTRAMUSCULAR; INTRAVENOUS
Status: DISCONTINUED | OUTPATIENT
Start: 2022-05-05 | End: 2022-05-09 | Stop reason: HOSPADM

## 2022-05-05 RX ORDER — GUAIFENESIN 100 MG/5ML
81-324 LIQUID (ML) ORAL ONCE
Status: DISCONTINUED | OUTPATIENT
Start: 2022-05-05 | End: 2022-05-05 | Stop reason: HOSPADM

## 2022-05-05 RX ORDER — LIDOCAINE HYDROCHLORIDE 10 MG/ML
INJECTION INFILTRATION; PERINEURAL AS NEEDED
Status: DISCONTINUED | OUTPATIENT
Start: 2022-05-05 | End: 2022-05-05 | Stop reason: HOSPADM

## 2022-05-05 RX ORDER — MIDAZOLAM HYDROCHLORIDE 1 MG/ML
.5-2 INJECTION, SOLUTION INTRAMUSCULAR; INTRAVENOUS
Status: DISCONTINUED | OUTPATIENT
Start: 2022-05-05 | End: 2022-05-09 | Stop reason: HOSPADM

## 2022-05-05 RX ORDER — HEPARIN SODIUM 10000 [USP'U]/ML
INJECTION, SOLUTION INTRAVENOUS; SUBCUTANEOUS AS NEEDED
Status: DISCONTINUED | OUTPATIENT
Start: 2022-05-05 | End: 2022-05-05 | Stop reason: HOSPADM

## 2022-05-05 RX ORDER — NALOXONE HYDROCHLORIDE 0.4 MG/ML
0.4 INJECTION, SOLUTION INTRAMUSCULAR; INTRAVENOUS; SUBCUTANEOUS AS NEEDED
Status: CANCELLED | OUTPATIENT
Start: 2022-05-05

## 2022-05-05 RX ORDER — SODIUM CHLORIDE 9 MG/ML
75 INJECTION, SOLUTION INTRAVENOUS CONTINUOUS
Status: DISCONTINUED | OUTPATIENT
Start: 2022-05-05 | End: 2022-05-05 | Stop reason: HOSPADM

## 2022-05-05 RX ORDER — SODIUM CHLORIDE 0.9 % (FLUSH) 0.9 %
5-40 SYRINGE (ML) INJECTION EVERY 8 HOURS
Status: CANCELLED | OUTPATIENT
Start: 2022-05-05

## 2022-05-05 RX ORDER — SODIUM CHLORIDE 9 MG/ML
75 INJECTION, SOLUTION INTRAVENOUS CONTINUOUS
Status: CANCELLED | OUTPATIENT
Start: 2022-05-05

## 2022-05-05 RX ORDER — LIDOCAINE HYDROCHLORIDE 20 MG/ML
SOLUTION OROPHARYNGEAL AS NEEDED
Status: DISCONTINUED | OUTPATIENT
Start: 2022-05-05 | End: 2022-05-05 | Stop reason: HOSPADM

## 2022-05-05 RX ORDER — HYDROCODONE BITARTRATE AND ACETAMINOPHEN 5; 325 MG/1; MG/1
1 TABLET ORAL
Status: CANCELLED | OUTPATIENT
Start: 2022-05-05

## 2022-05-05 RX ORDER — MORPHINE SULFATE 4 MG/ML
1 INJECTION INTRAVENOUS
Status: CANCELLED | OUTPATIENT
Start: 2022-05-05

## 2022-05-05 RX ORDER — SODIUM CHLORIDE 0.9 % (FLUSH) 0.9 %
5-40 SYRINGE (ML) INJECTION AS NEEDED
Status: CANCELLED | OUTPATIENT
Start: 2022-05-05

## 2022-05-05 RX ORDER — ACETAMINOPHEN 325 MG/1
650 TABLET ORAL
Status: CANCELLED | OUTPATIENT
Start: 2022-05-05

## 2022-05-05 RX ORDER — ONDANSETRON 2 MG/ML
4 INJECTION INTRAMUSCULAR; INTRAVENOUS
Status: CANCELLED | OUTPATIENT
Start: 2022-05-05 | End: 2022-05-06

## 2022-05-05 RX ADMIN — IOPAMIDOL 150 ML: 755 INJECTION, SOLUTION INTRAVENOUS at 11:40

## 2022-05-05 RX ADMIN — FENTANYL CITRATE 50 MCG: 50 INJECTION, SOLUTION INTRAMUSCULAR; INTRAVENOUS at 09:48

## 2022-05-05 RX ADMIN — MIDAZOLAM HYDROCHLORIDE 2 MG: 1 INJECTION, SOLUTION INTRAMUSCULAR; INTRAVENOUS at 09:38

## 2022-05-05 RX ADMIN — FENTANYL CITRATE 50 MCG: 50 INJECTION, SOLUTION INTRAMUSCULAR; INTRAVENOUS at 09:34

## 2022-05-05 RX ADMIN — MIDAZOLAM HYDROCHLORIDE 1 MG: 1 INJECTION, SOLUTION INTRAMUSCULAR; INTRAVENOUS at 09:48

## 2022-05-05 RX ADMIN — MIDAZOLAM HYDROCHLORIDE 2 MG: 1 INJECTION, SOLUTION INTRAMUSCULAR; INTRAVENOUS at 09:34

## 2022-05-05 NOTE — Clinical Note
Right brachial vein. Accessed successfully. Radial access needle used. Number of attempts =  1.  Access obtained via previously placed IV

## 2022-05-05 NOTE — PROGRESS NOTES
TRANSFER - OUT REPORT:    ALE with Dr. Verenice Padilla / [address] with Dr. Jeanette Tovar  Right radial arterial access for LHC  Right brachial venous access for RHC  No intervention; CV consult for valve & single vessel CABG     TR band applied to right radial with 12 ml in band  7 fr Brachial venous sheath left intact, dressed with tegaderm     No bleeding or hematoma, site soft    MAR  Viscous Lidocaine @ 0921  5 mg versed  100 mcg Fentanyl   10,000 units heparin     Verbal report given to Jaqui Atkinson on Jasson Mail  being transferred to [address] [place] [address] for routine progression of care       Report consisted of patients Situation, Background, Assessment and Recommendations(SBAR). Information from the following report(s) Procedure Summary and MAR was reviewed with the receiving nurse. Opportunity for questions and clarification was provided.       Patient transported with:   Registered Nurse

## 2022-05-05 NOTE — PROGRESS NOTES
TRANSFER - OUT REPORT:    Verbal report given to Lavenr Myers RN(name) on Lobito Leonard being transferred to [facility](unit) for ordered procedure       Report consisted of patient's Situation, Background, Assessment and   Recommendations(SBAR). Information from the following report(s) SBAR was reviewed with the receiving nurse. Opportunity for questions and clarification was provided.       Patient transported with:   Registered Nurse

## 2022-05-05 NOTE — DISCHARGE INSTRUCTIONS
HEART CATHETERIZATION/ANGIOGRAPHY DISCHARGE INSTRUCTIONS    1. Check puncture site frequently for swelling or bleeding. If there is any bleeding, lie down and apply pressure over the area with a clean towel or washcloth. Notify your doctor for any redness, swelling, drainage, or oozing from the puncture site. Notify your doctor for any fever or chills. 2. If the extremity becomes cold, numb, or painful call [facility] at [phone].  3. Activity should be limited for the next 48 hours. Climb stairs as little as possible and avoid any stooping, bending, or strenuous activity for 48 hours. No heavy lifting (anything over 10 pounds) for 3 days. 4. You may resume your usual diet. Drink more fluids than usual.  5. Have a responsible person drive you home and stay with you for at least 24 hours after your heart catheterization/angiography. 6. You may remove bandage from your Right wrist in 24 hours. You may shower in 24 hours. No tub baths, hot tubs, or swimming for 1 week. Do not place any lotions, creams, powders, or ointments over puncture site for 1 week. You may place a clean band-aid over the puncture site each day for 5 days. Change daily. I have read the above instructions and have had the opportunity to ask questions.       Patient: ________________________   Date: 5/5/2022    Witness: _______________________   Date: 5/5/2022

## 2022-05-05 NOTE — PROGRESS NOTES
TRANSFER - IN REPORT:    Verbal report received from Pratik (name) on Maddison Tenorio  being received from [facility](unit) for ordered procedure      Report consisted of patients Situation, Background, Assessment and   Recommendations(SBAR). Information from the following report(s) SBAR was reviewed with the receiving nurse. Opportunity for questions and clarification was provided. Assessment completed upon patients arrival to unit and care assumed.

## 2022-05-05 NOTE — PROGRESS NOTES
Patient received to [address] [place] [address] room # 16  Ambulatory from [facility]. Patient scheduled for ALE RHC C today with Dr Yony Minor. Procedure reviewed & questions answered, voiced good understanding consent obtained & placed on chart. All medications and medical history reviewed. Will prep patient per orders. Patient & family updated on plan of care. The patient has a fraility score of 3-MANAGING WELL, based on ability to perform ADls by self. Patient has + 4 pitting edema.  Took  mg at 0600 am

## 2022-05-05 NOTE — Clinical Note
Aspirin Registry Question:   Aspirin was given prior to the procedure.  [email or web address]

## 2022-05-05 NOTE — Clinical Note
Contrast Dose Calculator:   Patient's age: 73.   Patient's sex: Male. Patient weight (kg) = 108. Creatinine level (mg/dL) = 1.7. Creatinine clearance (mL/min): 66.18. Contrast concentration (mg/mL) = 370. MACD = 257.55 mL. Max Contrast dose per Creatinine Cl calculator = 300 mL.

## 2022-05-11 LAB
ECHO AV AREA PEAK VELOCITY: 1.5 CM2
ECHO AV AREA VTI: 1.8 CM2
ECHO AV AREA/BSA PEAK VELOCITY: 0.7 CM2/M2
ECHO AV AREA/BSA VTI: 0.8 CM2/M2
ECHO AV MEAN GRADIENT: 11 MMHG
ECHO AV MEAN VELOCITY: 1.6 M/S
ECHO AV PEAK GRADIENT: 22 MMHG
ECHO AV PEAK VELOCITY: 2.3 M/S
ECHO AV VELOCITY RATIO: 0.35
ECHO AV VTI: 47.9 CM
ECHO LVOT AREA: 4.5 CM2
ECHO LVOT AV VTI INDEX: 0.41
ECHO LVOT DIAM: 2.4 CM
ECHO LVOT MEAN GRADIENT: 1 MMHG
ECHO LVOT PEAK GRADIENT: 3 MMHG
ECHO LVOT PEAK VELOCITY: 0.8 M/S
ECHO LVOT STROKE VOLUME INDEX: 41.4 ML/M2
ECHO LVOT SV: 89.1 ML
ECHO LVOT VTI: 19.7 CM
ECHO TV REGURGITANT MAX VELOCITY: 3.08 M/S
ECHO TV REGURGITANT PEAK GRADIENT: 39 MMHG

## 2022-05-26 ENCOUNTER — OFFICE VISIT (OUTPATIENT)
Dept: CARDIOLOGY CLINIC | Age: 67
End: 2022-05-26
Payer: COMMERCIAL

## 2022-05-26 VITALS
SYSTOLIC BLOOD PRESSURE: 114 MMHG | HEART RATE: 100 BPM | WEIGHT: 232.2 LBS | BODY MASS INDEX: 37.32 KG/M2 | DIASTOLIC BLOOD PRESSURE: 72 MMHG | HEIGHT: 66 IN

## 2022-05-26 DIAGNOSIS — I42.8 OTHER CARDIOMYOPATHY (HCC): Primary | ICD-10-CM

## 2022-05-26 DIAGNOSIS — I35.1 NONRHEUMATIC AORTIC VALVE INSUFFICIENCY: ICD-10-CM

## 2022-05-26 PROCEDURE — 1036F TOBACCO NON-USER: CPT | Performed by: INTERNAL MEDICINE

## 2022-05-26 PROCEDURE — 99214 OFFICE O/P EST MOD 30 MIN: CPT | Performed by: INTERNAL MEDICINE

## 2022-05-26 PROCEDURE — 1123F ACP DISCUSS/DSCN MKR DOCD: CPT | Performed by: INTERNAL MEDICINE

## 2022-05-26 PROCEDURE — G8428 CUR MEDS NOT DOCUMENT: HCPCS | Performed by: INTERNAL MEDICINE

## 2022-05-26 PROCEDURE — 3017F COLORECTAL CA SCREEN DOC REV: CPT | Performed by: INTERNAL MEDICINE

## 2022-05-26 PROCEDURE — G8417 CALC BMI ABV UP PARAM F/U: HCPCS | Performed by: INTERNAL MEDICINE

## 2022-05-26 RX ORDER — METOPROLOL SUCCINATE 25 MG/1
25 TABLET, EXTENDED RELEASE ORAL DAILY
Qty: 30 TABLET | Refills: 3 | Status: SHIPPED | OUTPATIENT
Start: 2022-05-26

## 2022-05-26 ASSESSMENT — ENCOUNTER SYMPTOMS
SHORTNESS OF BREATH: 0
NAUSEA: 0
HEMOPTYSIS: 0
ABDOMINAL PAIN: 0
BLOATING: 0
VOMITING: 0
COUGH: 0
BLURRED VISION: 0
BACK PAIN: 0
ORTHOPNEA: 0
DOUBLE VISION: 0

## 2022-05-26 NOTE — PROGRESS NOTES
[facility] CARDIOLOGY  [address], [address]  PHONE: [phone]    22    NAME:  Lenard Barlow  : 1955  MRN: [id number]         SUBJECTIVE:   Mirna Whiting is a 73 y.o. male seen for a visit regarding the following:     Chief Complaint   Patient presents with   [address] from 1650 "[employer] Errplane Results     echo        HPI:      No prior history of coronary disease. Also evaluated by pulmonology for ILD (since ). Recent issues with worsening edema and saw PCP and echocardiogram obtained with severe left ventricular dysfunction/severely dilated LV; also possible severe aortic  regurgitation but eccentric jet [22; mild RV dysfn; RVSP 41mmHg]. 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 Coronary angiogram from 2022 with distal RCA around 90%; other anatomy with mild nonobstructive disease      Improved symptoms since initial evaluation from 2022; down about 15-20 pounds since then. States improved dyspnea on exertion. Still some persistent lower extremity edema. Denies any chest discomfort. States can walk over a flight of stairs currently. No chest discomfort. Tolerating medications. Denies any dizziness. Prior-from initial evaluation from 2022, states progressive dyspnea/edema over the last couple years. On Ofev with pulmonology. Also appears taking Lasix 40 mg once a day. Can walk on the treadmill for ~10 mins; issues with progressive ROOT. Recent wt gain of about 10 pounds over the last 3 months. Denies any syncope/presyncope. No PND/orthopnea. No chest discomfort.       Cardiomyopathy-not controlled, aortic regurgitation-not controlled, edema-not controlled     Past Medical History, Past Surgical History, Family history, Social History, and Medications were all reviewed with the patient today and updated as necessary.      Allergies   Allergen Reactions    Penicillin G Shortness Of Breath     Patient Active Problem List   Diagnosis    Dyspnea    Benign essential hypertension    Diverticulosis of intestine    History of smoking 25-50 pack years    Male erectile disorder    [facility]    Vasculogenic erectile dysfunction    Bronchiectasis without complication [facility]    Hyperlipidemia    Early syphilis, syphilitic uveitis    Class 2 severe obesity due to excess calories with serious comorbidity and body mass index (BMI) of 35.0 to 35.9 in adult (HCC)    Clubbing of fingers    Primary osteoarthritis of both knees    Pulmonary infiltrates    Benign neoplasm of colon    Vitamin D deficiency    Dilated cardiomyopathy ([facility]    CHF (congestive heart failure) ([facility]     Past Medical History:   Diagnosis Date    ED (erectile dysfunction)     Hyperlipidemia     Hypertension     Osteoarthritis of knees, bilateral     Vitamin D deficiency      Past Surgical History:   Procedure Laterality Date    COLONOSCOPY  2019    Dr. Do Horn, due for repeat in 5 years    ORTHOPEDIC SURGERY Left     left wrist, cyst     Family History   Problem Relation Age of Onset    Breast Cancer Sister     Heart Disease Father     Coronary Art Dis Neg Hx      Social History     Tobacco Use    Smoking status: Former Smoker     Packs/day: 1.00     Quit date: 2014     Years since quittin.0    Smokeless tobacco: Never Used   Substance Use Topics    Alcohol use: Yes     Current Outpatient Medications   Medication Sig Dispense Refill    metoprolol succinate (TOPROL XL) 25 MG extended release tablet Take 1 tablet by mouth daily 30 tablet 3    albuterol sulfate  (90 Base) MCG/ACT inhaler Inhale 2 puffs into the lungs every 4 hours as needed      atorvastatin (LIPITOR) 20 MG tablet TAKE 1 TABLET BY MOUTH EVERY DAY      Budeson-Glycopyrrol-Formoterol (BREZTRI AEROSPHERE) 160-9-4.8 MCG/ACT AERO Inhale 2 puffs into the lungs 2 times daily      bumetanide (BUMEX) 1 MG tablet Take 1 mg by mouth 2 times daily      ergocalciferol (ERGOCALCIFEROL) 1.25 MG (52728 UT) capsule TAKE 1 CAPSULE BY MOUTH ONCE EVERY 7 DAYS      Nintedanib Esylate (OFEV) 150 MG CAPS TAKE 1 CAPSULE BY MOUTH TWICE A DAY 12 HOURS APART WITH FOOD      spironolactone (ALDACTONE) 25 MG tablet Take 25 mg by mouth daily      Tadalafil 2.5 MG TABS Take 1 tablet (2.5 mg) by mouth daily.  valsartan (DIOVAN) 160 MG tablet Take 160 mg by mouth daily       No current facility-administered medications for this visit. Review of Systems   Constitutional: Positive for weight gain. Negative for chills, decreased appetite, fever and malaise/fatigue. HENT: Negative for nosebleeds. Eyes: Negative for blurred vision and double vision. Cardiovascular: Positive for dyspnea on exertion and leg swelling. Negative for chest pain, claudication, orthopnea, palpitations, paroxysmal nocturnal dyspnea and syncope. Respiratory: Negative for cough, hemoptysis and shortness of breath. Endocrine: Negative for cold intolerance and heat intolerance. Hematologic/Lymphatic: Negative for bleeding problem. Skin: Negative for rash. Musculoskeletal: Negative for back pain, joint pain, muscle weakness and myalgias. Gastrointestinal: Negative for bloating, abdominal pain, nausea and vomiting. Genitourinary: Negative for dysuria. Neurological: Negative for dizziness, light-headedness and weakness. Psychiatric/Behavioral: Negative for altered mental status. PHYSICAL EXAM:    /72   Pulse 100   Ht 5' 6\" (1.676 m)   Wt 232 lb 3.2 oz (105.3 kg)   BMI 37.48 kg/m²      Physical Exam  Constitutional:       Appearance: Normal appearance. HENT:      Head: Normocephalic and atraumatic. Mouth/Throat:      Mouth: Mucous membranes are moist.   Eyes:      Pupils: Pupils are equal, round, and reactive to light.    Cardiovascular: Rate and Rhythm: Normal rate and regular rhythm. Pulses: Normal pulses. Heart sounds: Murmur (low grade ТАТЬЯНА at RUSB; also low grade DM at LSB) heard. Pulmonary:      Effort: Pulmonary effort is normal.      Breath sounds: Normal breath sounds. Abdominal:      General: Bowel sounds are normal. There is no distension. Palpations: Abdomen is soft. Tenderness: There is no abdominal tenderness. Musculoskeletal:         General: Swelling (1+) present. Cervical back: Normal range of motion. Skin:     General: Skin is warm and dry. Neurological:      General: No focal deficit present. Mental Status: He is alert and oriented to person, place, and time. Medical problems and test results were reviewed with the patient today.      Recent Results (from the past 672 hour(s))   CBC    Collection Time: 05/02/22  9:50 AM   Result Value Ref Range    WBC 6.4 4.3 - 11.1 K/uL    RBC 5.21 4.23 - 5.6 M/uL    Hemoglobin 16.8 13.6 - 17.2 g/dL    Hematocrit 53.3 (H) 41.1 - 50.3 %    .3 (H) 79.6 - 97.8 FL    MCH 32.2 26.1 - 32.9 PG    MCHC 31.5 31.4 - 35.0 g/dL    RDW 13.4 11.9 - 14.6 %    Platelets 296 230 - 124 K/uL    MPV 9.9 9.4 - 12.3 FL    NRBC Absolute 0.00 0.0 - 0.2 K/uL   Basic Metabolic Panel    Collection Time: 05/02/22  9:50 AM   Result Value Ref Range    Sodium 140 136 - 145 mmol/L    Potassium 4.4 3.5 - 5.1 mmol/L    Chloride 105 98 - 107 mmol/L    CO2 30 21 - 32 mmol/L    Anion Gap 5 (L) 7 - 16 mmol/L    Glucose 87 65 - 100 mg/dL    BUN 18 8 - 23 MG/DL    CREATININE 1.70 (H) 0.8 - 1.5 MG/DL    GFR African American 52 (L) >60 ml/min/1.73m2    EGFR IF NonAfrican American 43 (L) >60 ml/min/1.73m2    Calcium 9.5 8.3 - 10.4 MG/DL   Comprehensive Metabolic Panel    Collection Time: 05/05/22  8:08 AM   Result Value Ref Range    Sodium 136 (L) 138 - 145 mmol/L    Potassium 4.5 3.5 - 5.1 mmol/L    Chloride 104 98 - 107 mmol/L    CO2 31 21 - 32 mmol/L    Anion Gap 1 (L) 7 - 16 mmol/L    Glucose 86 65 - 100 mg/dL    BUN 20 8 - 23 MG/DL    CREATININE 1.80 (H) 0.8 - 1.5 MG/DL    GFR  49 (L) >60 ml/min/1.73m2    EGFR IF NonAfrican American 40 (L) >60 ml/min/1.73m2    Calcium 9.5 8.3 - 10.4 MG/DL    Total Bilirubin 2.0 (H) 0.2 - 1.1 MG/DL    ALT 27 12 - 65 U/L    AST 25 15 - 37 U/L    Alkaline Phosphatase 77 50 - 136 U/L    Total Protein 7.6 6.3 - 8.2 g/dL    Albumin 3.7 3.2 - 4.6 g/dL    Globulin 3.9 (H) 2.3 - 3.5 g/dL    Albumin/Globulin Ratio 0.9 (L) 1.2 - 3.5     EKG 12 Lead    Collection Time: 05/05/22  9:00 AM   Result Value Ref Range    Ventricular Rate 89 BPM    Atrial Rate 89 BPM    P-R Interval 202 ms    QRS Duration 108 ms    Q-T Interval 376 ms    QTc Calculation (Bazett) 457 ms    P Axis 48 degrees    R Axis -60 degrees    T Axis 44 degrees    Diagnosis       Sinus rhythm with occasional Premature ventricular complexes  Biatrial enlargement  Pulmonary disease pattern  Left anterior fascicular block  Left ventricular hypertrophy  Abnormal ECG  No previous ECGs available  Confirmed by Mariela Treviño MD (), Eric Burk ([id number]) on 5/5/2022 9:00:19 AM     Transesophageal echocardiogram (CARY) with contrast and 3D PRN    Collection Time: 05/05/22 10:12 AM   Result Value Ref Range    LVOT Stroke Volume Index 41.4 mL/m2    LVOT Area 4.5 cm2    AV Velocity Ratio 0.35     LVOT:AV VTI Index 0.41     LARRY/BSA VTI 0.8 cm2/m2    LARRY/BSA Peak Velocity 0.7 cm2/m2    LVOT Diameter 2.4 cm    LVOT Peak Gradient 3 mmHg    LVOT Mean Gradient 1 mmHg    LVOT SV 89.1 ml    LVOT Peak Velocity 0.8 m/s    LVOT VTI 19.7 cm    AV Area by Peak Velocity 1.5 cm2    AV Area by VTI 1.8 cm2    AV Peak Gradient 22 mmHg    AV Mean Gradient 11 mmHg    AV Peak Velocity 2.3 m/s    AV Mean Velocity 1.6 m/s    AV VTI 47.9 cm    TR Peak Gradient 39 mmHg    TR Max Velocity 3.08 m/s   POC ACTIVATED CLOTTING TIME    Collection Time: 05/05/22 11:19 AM   Result Value Ref Range    Activated Clotting Time 321 (H) 70 - 128 Blanca Longo MD  5/26/2022  9:11 AM

## 2022-06-03 DIAGNOSIS — I35.1 NONRHEUMATIC AORTIC VALVE INSUFFICIENCY: ICD-10-CM

## 2022-06-03 DIAGNOSIS — I42.8 OTHER CARDIOMYOPATHY (HCC): ICD-10-CM

## 2022-06-03 LAB
ANION GAP SERPL CALC-SCNC: 11 MMOL/L (ref 7–16)
BUN SERPL-MCNC: 19 MG/DL (ref 8–23)
CALCIUM SERPL-MCNC: 9.5 MG/DL (ref 8.3–10.4)
CHLORIDE SERPL-SCNC: 105 MMOL/L (ref 98–107)
CO2 SERPL-SCNC: 25 MMOL/L (ref 21–32)
CREAT SERPL-MCNC: 1.6 MG/DL (ref 0.8–1.5)
GLUCOSE SERPL-MCNC: 80 MG/DL (ref 65–100)
POTASSIUM SERPL-SCNC: 3.9 MMOL/L (ref 3.5–5.1)
SODIUM SERPL-SCNC: 141 MMOL/L (ref 138–145)

## 2022-06-27 ENCOUNTER — TELEPHONE (OUTPATIENT)
Dept: CARDIOLOGY CLINIC | Age: 67
End: 2022-06-27

## 2022-06-28 DIAGNOSIS — J84.9 ILD (INTERSTITIAL LUNG DISEASE) (HCC): Primary | ICD-10-CM

## 2022-06-28 LAB
ALBUMIN SERPL-MCNC: 3.2 G/DL (ref 3.2–4.6)
ALBUMIN/GLOB SERPL: 0.9 {RATIO} (ref 1.2–3.5)
ALP SERPL-CCNC: 79 U/L (ref 50–136)
ALT SERPL-CCNC: 24 U/L (ref 12–65)
AST SERPL-CCNC: 26 U/L (ref 15–37)
BILIRUB DIRECT SERPL-MCNC: 0.6 MG/DL
BILIRUB SERPL-MCNC: 2.3 MG/DL (ref 0.2–1.1)
GLOBULIN SER CALC-MCNC: 3.5 G/DL (ref 2.3–3.5)
PROT SERPL-MCNC: 6.7 G/DL (ref 6.3–8.2)

## 2022-07-12 ENCOUNTER — TELEPHONE (OUTPATIENT)
Dept: CARDIOLOGY CLINIC | Age: 67
End: 2022-07-12

## 2022-07-12 NOTE — TELEPHONE ENCOUNTER
Peer to peer is needed for his MRI of the heart.  Please call [phone]    Case#  [id number]   [facility]

## 2022-07-13 NOTE — TELEPHONE ENCOUNTER
Have not received any paperwork in my box but will forward to physician for [id number] completion.  //pg

## 2022-07-13 NOTE — TELEPHONE ENCOUNTER
Tried calling but having a hard time getting through. Making me go through multiple steps and cannot get to someone. Can you check with them and see what they are looking for. 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  Thanks

## 2022-08-08 ENCOUNTER — OFFICE VISIT (OUTPATIENT)
Dept: CARDIOLOGY CLINIC | Age: 67
End: 2022-08-08
Payer: COMMERCIAL

## 2022-08-08 VITALS
DIASTOLIC BLOOD PRESSURE: 68 MMHG | HEART RATE: 96 BPM | HEIGHT: 66 IN | BODY MASS INDEX: 37.83 KG/M2 | WEIGHT: 235.4 LBS | SYSTOLIC BLOOD PRESSURE: 110 MMHG

## 2022-08-08 DIAGNOSIS — I50.42 CHRONIC COMBINED SYSTOLIC AND DIASTOLIC CONGESTIVE HEART FAILURE (HCC): ICD-10-CM

## 2022-08-08 DIAGNOSIS — I35.1 NONRHEUMATIC AORTIC VALVE INSUFFICIENCY: ICD-10-CM

## 2022-08-08 DIAGNOSIS — I42.8 OTHER CARDIOMYOPATHY (HCC): Primary | ICD-10-CM

## 2022-08-08 PROCEDURE — 99214 OFFICE O/P EST MOD 30 MIN: CPT | Performed by: INTERNAL MEDICINE

## 2022-08-08 PROCEDURE — G8417 CALC BMI ABV UP PARAM F/U: HCPCS | Performed by: INTERNAL MEDICINE

## 2022-08-08 PROCEDURE — G8428 CUR MEDS NOT DOCUMENT: HCPCS | Performed by: INTERNAL MEDICINE

## 2022-08-08 PROCEDURE — 1123F ACP DISCUSS/DSCN MKR DOCD: CPT | Performed by: INTERNAL MEDICINE

## 2022-08-08 PROCEDURE — 3017F COLORECTAL CA SCREEN DOC REV: CPT | Performed by: INTERNAL MEDICINE

## 2022-08-08 PROCEDURE — 1036F TOBACCO NON-USER: CPT | Performed by: INTERNAL MEDICINE

## 2022-08-08 ASSESSMENT — ENCOUNTER SYMPTOMS
BLURRED VISION: 0
BACK PAIN: 0
HEMOPTYSIS: 0
COUGH: 0
VOMITING: 0
ABDOMINAL PAIN: 0
SHORTNESS OF BREATH: 0
DOUBLE VISION: 0
BLOATING: 0
NAUSEA: 0
ORTHOPNEA: 0

## 2022-08-08 NOTE — PROGRESS NOTES
[facility] CARDIOLOGY  [address], [address]  PHONE: [phone]    22    NAME:  Yaritza Barlow  : 1955  MRN: [id number]         SUBJECTIVE:   Lyn Miner is a 73 y.o. male seen for a visit regarding the following:     Chief Complaint   Patient presents with    Hypertension    Congestive Heart Failure       HPI:      No prior history of coronary disease. Also evaluated by pulmonology for ILD (since ). Recent issues with worsening edema and saw PCP and echocardiogram obtained with severe left ventricular dysfunction/severely dilated LV; also possible severe aortic  regurgitation but eccentric jet [22; mild RV dysfn; RVSP 41mmHg]. 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 Coronary angiogram from 2022 with distal RCA around 90%; other anatomy with mild nonobstructive disease     Ordered a cardiac MRI at last visit but denied by insurance company requiring [id number] and subsequently unable to get a hold off. Mostly stable symptoms. Complains of some scrotal edema in the evenings. No definite PND/orthopnea. Overall, improved symptoms since initial evaluation from 2022; down about 15-20 pounds since then. States improved dyspnea on exertion. Still some persistent lower extremity edema. Denies any chest discomfort. States can walk over a flight of stairs currently. No chest discomfort. Tolerating medications. Denies any dizziness. Prior-from initial evaluation from 2022, states progressive dyspnea/edema over the last couple years. On Ofev with pulmonology. Also appears taking Lasix 40 mg once a day. Can walk on the treadmill for ~10 mins; issues with progressive ROOT. Recent wt gain of about 10 pounds over the last 3 months. Denies any syncope/presyncope. No PND/orthopnea. No chest discomfort. tablet Take 1 tablet by mouth daily 30 tablet 3    albuterol sulfate  (90 Base) MCG/ACT inhaler Inhale 2 puffs into the lungs every 4 hours as needed      atorvastatin (LIPITOR) 20 MG tablet TAKE 1 TABLET BY MOUTH EVERY DAY      Budeson-Glycopyrrol-Formoterol (BREZTRI AEROSPHERE) 160-9-4.8 MCG/ACT AERO Inhale 2 puffs into the lungs 2 times daily      bumetanide (BUMEX) 1 MG tablet Take 1 mg by mouth 2 times daily      ergocalciferol (ERGOCALCIFEROL) 1.25 MG (15776 UT) capsule TAKE 1 CAPSULE BY MOUTH ONCE EVERY 7 DAYS      Nintedanib Esylate (OFEV) 150 MG CAPS TAKE 1 CAPSULE BY MOUTH TWICE A DAY 12 HOURS APART WITH FOOD      spironolactone (ALDACTONE) 25 MG tablet Take 25 mg by mouth daily      Tadalafil 2.5 MG TABS Take 1 tablet (2.5 mg) by mouth daily. valsartan (DIOVAN) 160 MG tablet Take 160 mg by mouth daily       No current facility-administered medications for this visit. Review of Systems   Constitutional: Negative for chills, decreased appetite, fever, malaise/fatigue and weight gain. HENT:  Negative for nosebleeds. Eyes:  Negative for blurred vision and double vision. Cardiovascular:  Positive for dyspnea on exertion and leg swelling. Negative for chest pain, claudication, orthopnea, palpitations, paroxysmal nocturnal dyspnea and syncope. Respiratory:  Negative for cough, hemoptysis and shortness of breath. Endocrine: Negative for cold intolerance and heat intolerance. Hematologic/Lymphatic: Negative for bleeding problem. Skin:  Negative for rash. Musculoskeletal:  Negative for back pain, joint pain, muscle weakness and myalgias. Gastrointestinal:  Negative for bloating, abdominal pain, nausea and vomiting. Genitourinary:  Negative for dysuria. Neurological:  Negative for dizziness, light-headedness and weakness. Psychiatric/Behavioral:  Negative for altered mental status.       PHYSICAL EXAM:    /68   Pulse 96   Ht 5' 6\" (1.676 m)   Wt 235 lb 6.4 oz (106.8 kg)   BMI 37.99 kg/m²      Physical Exam  Constitutional:       Appearance: Normal appearance. HENT:      Head: Normocephalic and atraumatic. Mouth/Throat:      Mouth: Mucous membranes are moist.   Eyes:      Pupils: Pupils are equal, round, and reactive to light. Cardiovascular:      Rate and Rhythm: Normal rate and regular rhythm. Pulses: Normal pulses. Heart sounds: Murmur (low grade ТАТЬЯНА at RUSB; also low grade DM at LSB) heard. Pulmonary:      Effort: Pulmonary effort is normal.      Breath sounds: Normal breath sounds. Abdominal:      General: Bowel sounds are normal. There is no distension. Palpations: Abdomen is soft. Tenderness: There is no abdominal tenderness. Musculoskeletal:         General: No swelling. Cervical back: Normal range of motion. Skin:     General: Skin is warm and dry. Neurological:      General: No focal deficit present. Mental Status: He is alert and oriented to person, place, and time. Medical problems and test results were reviewed with the patient today. No results found for this or any previous visit (from the past 672 hour(s)). Lab Results   Component Value Date/Time    CHOL 152 04/04/2022 12:09 PM    HDL 47 04/04/2022 12:09 PM    VLDL 13 04/04/2022 12:09 PM       No results found for any visits on 08/08/22. ASSESSMENT and PLAN    Vida Jones was seen today for hypertension and congestive heart failure. Diagnoses and all orders for this visit:    Other cardiomyopathy [facility])  -     [address]; Future    Nonrheumatic aortic valve insufficiency    Chronic combined systolic and diastolic congestive heart failure ([address]    Other orders  -     dapagliflozin (FARXIGA) 10 MG tablet;  Take 1 tablet by mouth every morning      Overall Impression       Cardiomyopathy-uncertain etiology but discussed possibly multifactorial. Noted possibly severe aortic regurgitation which could explain noted dilated cardiomyopathy/severe left ventricular dysfunction. EKG with normal QRS duration. 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 Set up with cardiac MRI; discussed would be indicated in the setting of uncertain aortic regurgitation severity/possible noncompaction and would help with diagnosis. Discussed uncertain regarding insurance company stalling with difficulty reaching out. With some evening scrotal edema, attempted Bumex 2 mg in a.m. [currently 1 mg twice daily]; discussed some underlying CKD. Baseline creatinine likely around 1.6-1.7. Some acute on chronic renal failure with diuretics. Continue valsartan. Continue Aldactone and Bumex. Added on low-dose Toprol-XL. Low BPs would limit options with Entresto at this time. Close monitor renal function with changes. Discussed will need to consider alternative etiologies if above work-up not suggestive (QRS ~100msec)     CHF-exam with improved volume status. See above with changes. ILD-Per pulmonology. ? Some findings suggestive of volume issues on prior CT scan. Imaging reviewed and discussed. Aortic regurgitation-possibly severe could contribute to overall presentation. Reassess with cardiac MRI as of volumetric analysis. Difficult to gauge in CARY. Discussed consideration for surgical intervention based on findings although higher risk with severe left ventricular dysfunction. Hyperlipidemia-on a moderate intensity statin. Reassess intensification in the future. Return in about 3 months (around 11/8/2022).      Ivanna Calles MD  8/8/2022  8:46 AM

## 2022-08-12 NOTE — PROGRESS NOTES
[address] [facility]. [address], [address] [facility]  [phone]    Patient Name:  [facility]      YOB: 1955  Office Visit 8/15/2022    ASSESSMENT AND PLAN:  (Medical Decision Making)    Diagnoses and all orders for this visit:  ILD (interstitial lung disease) (HCC)-pt to continue Ofev, check LFTs today. -     Hepatic Function Panel; Future  Bronchiectasis, uncomplicated (HCC)-continue flutter valve, add mucinex BID. Pt was given samples. Continue Breztri BID. Samples were provided. -     Hepatic Function Panel; Future  Lower extremity edema-continue diuretics per cardiology recommendations. Other orders  -     Budeson-Glycopyrrol-Formoterol (BREZTRI AEROSPHERE) 160-9-4.8 MCG/ACT AERO; Inhale 2 puffs into the lungs 2 times daily    Follow up in 3 months or sooner if needed. FELIPE Aponte    Clinical time for encounter was 27 minutes. Dr. Claudia Valenzuela was the onsite physician and was available to answer any questions. _________________________________________________________________________    HISTORY OF PRESENT ILLNESS:    Mr. Christine Cavazos in our clinic today for follow up of ILD on Ofev, bronchiectasis, and lower extremity edema. LFTs 2/2022 were normal.   Pt reports that he has been doing about the same since his last visit. His breathing has been about the same. He does think the breztri is helping some. His main issue is his cough. He is using his flutter valve 2-3 times per day to help with his cough. He is able to cough up phlegm. His phlegm is clear to light yellow in color. The main issue is how hard it is to cough up his  mucous. He will have to cough until his chest hurts at times. He is not taking mucinex. He is agreeable to trial mucinex to see if this will help thin his secretions and make it easier to cough up. Pt is taking ofev with no major issues. His diarrhea now only occurs about once per day.  This is obtained. FINDINGS:    Cardiac Silhouette: Mildly enlarged. Lungs: Diffuse prominence of the pulmonary interstitium. No focal airspace  disease. Pleura: No pleural effusion. No pneumothorax. Osseous Structures: Thoracic spine spondylosis. Upper Abdomen: Unremarkable. Impression  IMPRESSION:    1. Diffuse prominence of the pulmonary interstitium. While this may be a chronic  finding, a bronchitis or atypical or viral infection cannot be excluded. 2. No evidence of a focal consolidation. 3. Mildly enlarged cardiac silhouette. VOICE DICTATED BY: Dr. Natasha Dunaway    CT Chest:   CT CHEST WO CONTRAST 03/26/2021    Narrative  HIGH-RESOLUTION CHEST CT SCAN (HRCT). HISTORY: Interstitial lung disease, cough and wheezing. TECHNIQUE: Routine noncontrasted 5 mm scans of the chest and selected prone and  supine 1 mm HRCT protocol images were obtained. Radiation dose reduction  techniques were used for this study. Our CT scanners use one or more of the  following:  Automated exposure control, adjustment of the mA and or kV according  to patient size, iterative reconstruction. COMPARISON:  February 2020. FINDINGS:  LUNGS: Large amount diffuse groundglass opacities. There is bilateral basilar  bronchiectasis. Also right middle lobe bronchiectasis. Small amount of  interlobular septal thickening. Air trapping anterior upper lobes. Pulmonary  nodule left upper lobe, image 10 series 3 stable. No new pulmonary nodules. AIRWAYS: Trachea and proximal bronchi grossly patent. Bronchiectasis as above. PLEURA: No effusion or thickening or calcifications. LYMPH NODES: Several hilar lymph nodes. [place] HEART: Normal size. CORONARIES: Mild  calcifications. UPPER ABDOMEN: Multiple bilateral simple appearing cysts. Symmetric thickening  of the adrenal glands, hyperplasia. SKELETAL/CHEST WALL: DJD, otherwise no gross bony lesions.     Impression  Findings as above, without significant change from exam last year. There is bronchiectasis in the right middle lobe and especially the bases. Some  interlobular septal thickening and diffuse groundglass opacities. Nuclear Medicine: No results found for this or any previous visit from the past 3650 days. PFTs:   No flowsheet data found. No results found for this or any previous visit. No results found for this or any previous visit. No results found for this or any previous visit. Exercise Oximetry: No results found for this or any previous visit. Echo:   TRANSESOPHAGEAL ECHOCARDIOGRAM (CONTRAST/3D PRN) 05/05/2022    Narrative  This is a summary report. The complete report is available in the patient's medical record. If you cannot access the medical record, please contact the sending organization for a detailed fax or copy. Left Ventricle: Severely reduced left ventricular systolic function with a visually estimated EF of 15 - 20%. Left ventricle is severely dilated. Severe global hypokinesis present. Aortic Valve: There is moderate to severe regurgitation which is eccentric. The eccentric nature of the jet may underestimate the severity. Mitral Valve: Mild to moderate regurgitation.     Past Medical History:   Diagnosis Date    ED (erectile dysfunction)     Hyperlipidemia     Hypertension     Osteoarthritis of knees, bilateral     Vitamin D deficiency       Tobacco Use: Medium Risk    Smoking Tobacco Use: Former    Smokeless Tobacco Use: Never     Allergies   Allergen Reactions    Penicillin G Shortness Of Breath     Current Outpatient Medications   Medication Instructions    albuterol sulfate  (90 Base) MCG/ACT inhaler 2 puffs, Inhalation, EVERY 4 HOURS PRN    atorvastatin (LIPITOR) 20 MG tablet TAKE 1 TABLET BY MOUTH EVERY DAY    Budeson-Glycopyrrol-Formoterol (BREZTRI AEROSPHERE) 160-9-4.8 MCG/ACT AERO 2 puffs, Inhalation, 2 TIMES DAILY    bumetanide (BUMEX) 1 mg, Oral, 2 TIMES DAILY    dapagliflozin (FARXIGA) 10 mg, Oral, EVERY MORNING    ergocalciferol (ERGOCALCIFEROL) 1.25 MG (71562 UT) capsule TAKE 1 CAPSULE BY MOUTH ONCE EVERY 7 DAYS    metoprolol succinate (TOPROL XL) 25 mg, Oral, DAILY    Nintedanib Esylate (OFEV) 150 MG CAPS TAKE 1 CAPSULE BY MOUTH TWICE A DAY 12 HOURS APART WITH FOOD    spironolactone (ALDACTONE) 25 mg, Oral, DAILY    Tadalafil 2.5 MG TABS Take 1 tablet (2.5 mg) by mouth daily.     valsartan (DIOVAN) 160 mg, Oral, DAILY

## 2022-08-15 ENCOUNTER — OFFICE VISIT (OUTPATIENT)
Dept: PULMONOLOGY | Age: 67
End: 2022-08-15
Payer: COMMERCIAL

## 2022-08-15 VITALS
SYSTOLIC BLOOD PRESSURE: 108 MMHG | RESPIRATION RATE: 18 BRPM | BODY MASS INDEX: 34.56 KG/M2 | OXYGEN SATURATION: 95 % | HEART RATE: 91 BPM | WEIGHT: 228 LBS | HEIGHT: 68 IN | TEMPERATURE: 97.2 F | DIASTOLIC BLOOD PRESSURE: 68 MMHG

## 2022-08-15 DIAGNOSIS — J47.9 BRONCHIECTASIS, UNCOMPLICATED (HCC): ICD-10-CM

## 2022-08-15 DIAGNOSIS — R60.0 LOWER EXTREMITY EDEMA: ICD-10-CM

## 2022-08-15 DIAGNOSIS — J84.9 ILD (INTERSTITIAL LUNG DISEASE) (HCC): ICD-10-CM

## 2022-08-15 DIAGNOSIS — J84.9 ILD (INTERSTITIAL LUNG DISEASE) (HCC): Primary | ICD-10-CM

## 2022-08-15 LAB
ALBUMIN SERPL-MCNC: 3.3 G/DL (ref 3.2–4.6)
ALBUMIN/GLOB SERPL: 0.8 {RATIO} (ref 1.2–3.5)
ALP SERPL-CCNC: 92 U/L (ref 50–136)
ALT SERPL-CCNC: 19 U/L (ref 12–65)
AST SERPL-CCNC: 15 U/L (ref 15–37)
BILIRUB DIRECT SERPL-MCNC: 0.9 MG/DL
BILIRUB SERPL-MCNC: 2.4 MG/DL (ref 0.2–1.1)
GLOBULIN SER CALC-MCNC: 4.1 G/DL (ref 2.3–3.5)
PROT SERPL-MCNC: 7.4 G/DL (ref 6.3–8.2)

## 2022-08-15 PROCEDURE — G8417 CALC BMI ABV UP PARAM F/U: HCPCS | Performed by: PHYSICIAN ASSISTANT

## 2022-08-15 PROCEDURE — 1036F TOBACCO NON-USER: CPT | Performed by: PHYSICIAN ASSISTANT

## 2022-08-15 PROCEDURE — G8427 DOCREV CUR MEDS BY ELIG CLIN: HCPCS | Performed by: PHYSICIAN ASSISTANT

## 2022-08-15 PROCEDURE — 99214 OFFICE O/P EST MOD 30 MIN: CPT | Performed by: PHYSICIAN ASSISTANT

## 2022-08-15 PROCEDURE — 3017F COLORECTAL CA SCREEN DOC REV: CPT | Performed by: PHYSICIAN ASSISTANT

## 2022-08-15 PROCEDURE — 1123F ACP DISCUSS/DSCN MKR DOCD: CPT | Performed by: PHYSICIAN ASSISTANT

## 2022-08-15 RX ORDER — BUDESONIDE, GLYCOPYRROLATE, AND FORMOTEROL FUMARATE 160; 9; 4.8 UG/1; UG/1; UG/1
2 AEROSOL, METERED RESPIRATORY (INHALATION) 2 TIMES DAILY
Qty: 1 EACH | Refills: 11 | Status: SHIPPED | OUTPATIENT
Start: 2022-08-15

## 2022-08-15 NOTE — PATIENT INSTRUCTIONS
I want you to take Mucinex 1200mg two times daily to see if this helps make your mucous easier to cough up. If it does, you can continue taking it twice daily every day. You can buy it over the counter at any local pharmacy.

## 2022-09-06 ENCOUNTER — HOSPITAL ENCOUNTER (OUTPATIENT)
Dept: MRI IMAGING | Age: 67
Discharge: HOME | End: 2022-09-09

## 2022-09-06 DIAGNOSIS — I42.8 OTHER CARDIOMYOPATHY (HCC): ICD-10-CM

## 2022-09-14 ENCOUNTER — HOSPITAL ENCOUNTER (EMERGENCY)
Dept: CT IMAGING | Age: 67
Discharge: HOME | End: 2022-09-17
Payer: COMMERCIAL

## 2022-09-14 ENCOUNTER — HOSPITAL ENCOUNTER (EMERGENCY)
Age: 67
Discharge: HOME | End: 2022-09-14
Attending: EMERGENCY MEDICINE
Payer: COMMERCIAL

## 2022-09-14 VITALS
DIASTOLIC BLOOD PRESSURE: 81 MMHG | SYSTOLIC BLOOD PRESSURE: 122 MMHG | WEIGHT: 238 LBS | TEMPERATURE: 98 F | RESPIRATION RATE: 15 BRPM | BODY MASS INDEX: 36.07 KG/M2 | HEART RATE: 99 BPM | HEIGHT: 68 IN | OXYGEN SATURATION: 93 %

## 2022-09-14 DIAGNOSIS — K40.90 INDIRECT LEFT INGUINAL HERNIA: ICD-10-CM

## 2022-09-14 DIAGNOSIS — R10.9 ACUTE ABDOMINAL PAIN: Primary | ICD-10-CM

## 2022-09-14 LAB
ALBUMIN SERPL-MCNC: 3.5 G/DL (ref 3.2–4.6)
ALBUMIN/GLOB SERPL: 0.9 {RATIO} (ref 1.2–3.5)
ALP SERPL-CCNC: 92 U/L (ref 50–136)
ALT SERPL-CCNC: 17 U/L (ref 12–65)
ANION GAP SERPL CALC-SCNC: 9 MMOL/L (ref 4–13)
AST SERPL-CCNC: 15 U/L (ref 15–37)
BILIRUB SERPL-MCNC: 2.2 MG/DL (ref 0.2–1.1)
BUN SERPL-MCNC: 10 MG/DL (ref 8–23)
CALCIUM SERPL-MCNC: 9.8 MG/DL (ref 8.3–10.4)
CHLORIDE SERPL-SCNC: 107 MMOL/L (ref 101–110)
CO2 SERPL-SCNC: 24 MMOL/L (ref 21–32)
CREAT SERPL-MCNC: 1.34 MG/DL (ref 0.8–1.5)
ERYTHROCYTE [DISTWIDTH] IN BLOOD BY AUTOMATED COUNT: 13.1 % (ref 11.9–14.6)
GLOBULIN SER CALC-MCNC: 3.9 G/DL (ref 2.3–3.5)
GLUCOSE SERPL-MCNC: 107 MG/DL (ref 65–100)
HCT VFR BLD AUTO: 49.7 % (ref 41.1–50.3)
HGB BLD-MCNC: 16.2 G/DL (ref 13.6–17.2)
MCH RBC QN AUTO: 33.1 PG (ref 26.1–32.9)
MCHC RBC AUTO-ENTMCNC: 32.6 G/DL (ref 31.4–35)
MCV RBC AUTO: 101.4 FL (ref 79.6–97.8)
NRBC # BLD: 0 K/UL (ref 0–0.2)
NT PRO BNP: ABNORMAL PG/ML (ref 5–125)
PLATELET # BLD AUTO: 196 K/UL (ref 150–450)
PMV BLD AUTO: 9.2 FL (ref 9.4–12.3)
POTASSIUM SERPL-SCNC: 4 MMOL/L (ref 3.5–5.1)
PROT SERPL-MCNC: 7.4 G/DL (ref 6.3–8.2)
RBC # BLD AUTO: 4.9 M/UL (ref 4.23–5.6)
SODIUM SERPL-SCNC: 140 MMOL/L (ref 136–145)
WBC # BLD AUTO: 7.3 K/UL (ref 4.3–11.1)

## 2022-09-14 PROCEDURE — 6360000004 HC RX CONTRAST MEDICATION: Performed by: EMERGENCY MEDICINE

## 2022-09-14 PROCEDURE — 74177 CT ABD & PELVIS W/CONTRAST: CPT

## 2022-09-14 PROCEDURE — 80053 COMPREHEN METABOLIC PANEL: CPT

## 2022-09-14 PROCEDURE — 83880 ASSAY OF NATRIURETIC PEPTIDE: CPT

## 2022-09-14 PROCEDURE — 6370000000 HC RX 637 (ALT 250 FOR IP): Performed by: EMERGENCY MEDICINE

## 2022-09-14 PROCEDURE — 2580000003 HC RX 258: Performed by: EMERGENCY MEDICINE

## 2022-09-14 PROCEDURE — 85027 COMPLETE CBC AUTOMATED: CPT

## 2022-09-14 PROCEDURE — 99285 EMERGENCY DEPT VISIT HI MDM: CPT

## 2022-09-14 RX ORDER — SODIUM CHLORIDE 0.9 % (FLUSH) 0.9 %
10 SYRINGE (ML) INJECTION
Status: DISCONTINUED | OUTPATIENT
Start: 2022-09-14 | End: 2022-09-18 | Stop reason: HOSPADM

## 2022-09-14 RX ORDER — HYDROCODONE BITARTRATE AND ACETAMINOPHEN 7.5; 325 MG/1; MG/1
1 TABLET ORAL EVERY 6 HOURS PRN
Qty: 8 TABLET | Refills: 0 | Status: SHIPPED | OUTPATIENT
Start: 2022-09-14 | End: 2022-09-19

## 2022-09-14 RX ORDER — 0.9 % SODIUM CHLORIDE 0.9 %
100 INTRAVENOUS SOLUTION INTRAVENOUS
Status: COMPLETED | OUTPATIENT
Start: 2022-09-14 | End: 2022-09-14

## 2022-09-14 RX ORDER — HYDROCODONE BITARTRATE AND ACETAMINOPHEN 7.5; 325 MG/1; MG/1
1 TABLET ORAL ONCE
Status: COMPLETED | OUTPATIENT
Start: 2022-09-14 | End: 2022-09-14

## 2022-09-14 RX ADMIN — IOPAMIDOL 100 ML: 755 INJECTION, SOLUTION INTRAVENOUS at 18:34

## 2022-09-14 RX ADMIN — SODIUM CHLORIDE 100 ML: 9 INJECTION, SOLUTION INTRAVENOUS at 18:34

## 2022-09-14 RX ADMIN — HYDROCODONE BITARTRATE AND ACETAMINOPHEN 1 TABLET: 7.5; 325 TABLET ORAL at 20:34

## 2022-09-14 ASSESSMENT — ENCOUNTER SYMPTOMS
COUGH: 0
DIARRHEA: 0
ABDOMINAL PAIN: 1
CONSTIPATION: 0
VOMITING: 0
NAUSEA: 0
HEMATOCHEZIA: 0

## 2022-09-14 ASSESSMENT — PAIN DESCRIPTION - LOCATION
LOCATION: GROIN
LOCATION: GROIN

## 2022-09-14 ASSESSMENT — PAIN DESCRIPTION - ORIENTATION: ORIENTATION: LEFT

## 2022-09-14 ASSESSMENT — PAIN SCALES - GENERAL
PAINLEVEL_OUTOF10: 9
PAINLEVEL_OUTOF10: 8

## 2022-09-14 ASSESSMENT — PAIN DESCRIPTION - PAIN TYPE: TYPE: ACUTE PAIN

## 2022-09-14 ASSESSMENT — PAIN DESCRIPTION - FREQUENCY: FREQUENCY: INTERMITTENT

## 2022-09-14 ASSESSMENT — PAIN DESCRIPTION - DESCRIPTORS: DESCRIPTORS: SHARP

## 2022-09-14 NOTE — Clinical Note
Rios Jorgensen was seen and treated in our emergency department on 9/14/2022. He may return to work on 09/17/2022. If you have any questions or concerns, please don't hesitate to call.       Robel Wiley MD

## 2022-09-14 NOTE — ED PROVIDER NOTES
Emergency Department Provider Note                   PCP:                Hilary Calderon MD               Age: 73 y.o. Sex: male     No diagnosis found. DISPOSITION          MDM  Number of Diagnoses or Management Options  Diagnosis management comments: Left lower quadrant pain with mass. Suspected hernia. Not easily reduced but has been out and tender for couple weeks. Suspect some incarceration but not necessarily strangulation. Will get CT to assess and also check for mass or ascites. Amount and/or Complexity of Data Reviewed  Clinical lab tests: ordered and reviewed  Tests in the radiology section of CPT®: ordered and reviewed  Review and summarize past medical records: yes (Patient with interstitial lung disease, hypertension. Also some aortic valve insufficiency and some cardiomyopathy. Patient taking diuretic.)    Risk of Complications, Morbidity, and/or Mortality  Presenting problems: moderate  Diagnostic procedures: low  Management options: low    Patient Progress  Patient progress: stable       Orders Placed This Encounter   Procedures    CT ABDOMEN PELVIS W IV CONTRAST Additional Contrast? None    CBC    Comprehensive Metabolic Panel    Brain Natriuretic Peptide    Insert peripheral IV        Medications - No data to display    New Prescriptions    No medications on file        Kong Garcia is a 73 y.o. male who presents to the Emergency Department with chief complaint of    Chief Complaint   Patient presents with    Abdominal Pain      67-year-old male comes in complaints of left lower quadrant abdominal pain with swelling. He states its been there about a month. Is been more painful in the last week. The swelling never really goes away completely but is does fluctuate somewhat in size. No fever or chills. No vomiting or diarrhea. No dysuria. No decrease in urine output. No abnormal bleeding. No particular chest pain or shortness of breath.   He does have some increased swelling his lower extremities. He has been seeing cardiology and has issues with a cardiomyopathy and aortic valve problem. He has been started on diuretics. No abdominal surgery in the past.  Pain is somewhat improved when the patient lays down and rest.  Is worse with activity. The history is provided by the patient. Abdominal Pain  Pain location:  LLQ  Pain quality: aching and dull    Pain radiates to:  Does not radiate  Pain severity:  Moderate  Onset quality:  Gradual  Duration:  4 weeks  Timing:  Constant  Progression:  Waxing and waning  Context: not trauma    Relieved by:  Position changes  Worsened by:  Palpation and movement  Ineffective treatments:  None tried  Associated symptoms: no chest pain, no chills, no constipation, no cough, no diarrhea, no dysuria, no fever, no hematochezia, no melena, no nausea and no vomiting        Review of Systems   Constitutional:  Negative for chills and fever. Respiratory:  Negative for cough. Cardiovascular:  Negative for chest pain. Gastrointestinal:  Positive for abdominal pain. Negative for constipation, diarrhea, hematochezia, melena, nausea and vomiting. Genitourinary:  Negative for difficulty urinating and dysuria. All other systems reviewed and are negative.     Past Medical History:   Diagnosis Date    ED (erectile dysfunction)     Hyperlipidemia     Hypertension     Osteoarthritis of knees, bilateral     Vitamin D deficiency         Past Surgical History:   Procedure Laterality Date    COLONOSCOPY  07/2019    Dr. Millie Almeida, due for repeat in 5 years    ORTHOPEDIC SURGERY Left     left wrist, cyst        Family History   Problem Relation Age of Onset    Breast Cancer Sister     Heart Disease Father     Coronary Art Dis Neg Hx         Social History     Socioeconomic History    Marital status: Legally    Tobacco Use    Smoking status: Former     Packs/day: 1.00     Years: 20.00     Pack years: 20.00     Types: Cigarettes Quit date: 2014     Years since quittin.3    Smokeless tobacco: Never   Substance and Sexual Activity    Alcohol use: Yes    Drug use: No         Penicillin g     Previous Medications    ALBUTEROL SULFATE  (90 BASE) MCG/ACT INHALER    Inhale 2 puffs into the lungs every 4 hours as needed    ATORVASTATIN (LIPITOR) 20 MG TABLET    TAKE 1 TABLET BY MOUTH EVERY DAY    BUDESON-GLYCOPYRROL-FORMOTEROL (BREZTRI AEROSPHERE) 160-9-4.8 MCG/ACT AERO    Inhale 2 puffs into the lungs 2 times daily    BUMETANIDE (BUMEX) 1 MG TABLET    Take 1 mg by mouth 2 times daily    DAPAGLIFLOZIN (FARXIGA) 10 MG TABLET    Take 1 tablet by mouth every morning    ERGOCALCIFEROL (ERGOCALCIFEROL) 1.25 MG (63671 UT) CAPSULE    TAKE 1 CAPSULE BY MOUTH ONCE EVERY 7 DAYS    METOPROLOL SUCCINATE (TOPROL XL) 25 MG EXTENDED RELEASE TABLET    Take 1 tablet by mouth daily    NINTEDANIB ESYLATE (OFEV) 150 MG CAPS    TAKE 1 CAPSULE BY MOUTH TWICE A DAY 12 HOURS APART WITH FOOD    SPIRONOLACTONE (ALDACTONE) 25 MG TABLET    Take 25 mg by mouth daily    TADALAFIL 2.5 MG TABS    Take 1 tablet (2.5 mg) by mouth daily. VALSARTAN (DIOVAN) 160 MG TABLET    Take 160 mg by mouth daily        Vitals signs and nursing note reviewed. Patient Vitals for the past 4 hrs:   Temp Pulse Resp BP SpO2   22 1700 98 °F (36.7 °C) (!) 116 20 118/69 98 %          Physical Exam  Vitals and nursing note reviewed. Constitutional:       Appearance: He is not ill-appearing. HENT:      Head: Normocephalic and atraumatic. Right Ear: External ear normal.      Left Ear: External ear normal.      Mouth/Throat:      Mouth: Mucous membranes are moist.      Pharynx: Oropharynx is clear. Eyes:      General: No scleral icterus. Extraocular Movements: Extraocular movements intact. Pupils: Pupils are equal, round, and reactive to light. Cardiovascular:      Rate and Rhythm: Normal rate and regular rhythm.    Pulmonary:      Effort: Pulmonary effort is normal.      Breath sounds: Normal breath sounds. Abdominal:      General: There is no distension. Palpations: Abdomen is soft. There is mass. Tenderness: There is abdominal tenderness in the left lower quadrant. Negative signs include Manning's sign and McBurney's sign. Hernia: A hernia is present. Hernia is present in the left inguinal area. Comments: Large hernia left lower quadrant. .  Left lower quadrant. Positive bowel sounds. Mild to moderate tenderness. No erythema or crepitus. Musculoskeletal:         General: No swelling or tenderness. Right lower leg: Edema present. Left lower leg: Edema present. Skin:     General: Skin is warm and dry. Neurological:      General: No focal deficit present. Mental Status: He is alert. Procedures    No results found for any visits on 09/14/22. CT ABDOMEN PELVIS W IV CONTRAST Additional Contrast? None    (Results Pending)          CT ABDOMEN PELVIS W IV CONTRAST Additional Contrast? None    Result Date: 9/14/2022  CT OF THE ABDOMEN AND PELVIS WITH CONTRAST:          CLINICAL HISTORY:   Left groin pain with testicular swelling. TECHNIQUE:  Oral and nonionic intravenous contrast was administered, and the abdomen and pelvis were scanned with spiral technique. Radiation dose reduction was achieved using one or all of the following techniques: automated exposure control, weight-based dosing, iterative reconstruction. COMPARISON:  None. CT ABDOMEN FINDINGS: Left heart enlargement with very small bilateral pleural effusions and probable pulmonary edema and a pattern suspicious for mild congestive heart failure. Sludge and tiny calcified gallstones are noted in the dependent portion of the gallbladder without pericholecystic inflammatory changes or biliary ductal dilatation. There are multiple renal cysts bilaterally, much larger on the left measuring up to 6.7 cm.   The liver, spleen, pancreas, and adrenals appear unremarkable. CT PELVIS FINDINGS: The appendix is not distended. There is a very large left inguinal hernia measuring approximately 18 cm craniocaudal containing nondilated small bowel and sigmoid colon with a small amount of free fluid. No bowel wall thickening or pneumatosis intestinalis is evident. No pathologically enlarged lymph nodes. Prostate is not enlarged. Bone windows demonstrate no definite aggressive process, accounting for degenerative changes. 1. Very large left inguinal hernia containing nondilated small bowel and sigmoid colon without evidence of obstruction or ischemia. Surgical consultation is recommended. 2.  Cholelithiasis without findings to suggest acute cholecystitis or biliary ductal dilatation. 3.  Left heart enlargement with findings suspicious for congestive heart failure. Will discuss with surgery. To see if office follow-up is appropriate. Surgery agrees to office follow-up. Voice dictation software was used during the making of this note. This software is not perfect and grammatical and other typographical errors may be present. This note has not been completely proofread for errors.      Lizette Fisher MD  09/14/22 1731       Lizette Fisher MD  09/14/22 2005       Lizette Fisher MD  09/14/22 2030

## 2022-09-14 NOTE — Clinical Note
Baltazar Dodd was seen and treated in our emergency department on 9/14/2022. He may return to work on 09/17/2022. If you have any questions or concerns, please don't hesitate to call.       Mary Castañeda MD

## 2022-09-14 NOTE — ED NOTES
Report from [facility]. Assumed care of pt at this time. Tyler Billings, [facility]  09/14/22 1910

## 2022-09-15 NOTE — DISCHARGE INSTRUCTIONS
Call surgery office tomorrow for appointment to recheck. Be sure to take stool softener such as Colace or over-the-counter MiraLAX.   Recheck sooner for worse pain, vomiting or high fever

## 2022-09-22 ENCOUNTER — PREP FOR PROCEDURE (OUTPATIENT)
Dept: SURGERY | Age: 67
End: 2022-09-22

## 2022-09-22 ENCOUNTER — OFFICE VISIT (OUTPATIENT)
Dept: SURGERY | Age: 67
End: 2022-09-22
Payer: COMMERCIAL

## 2022-09-22 VITALS
HEART RATE: 101 BPM | WEIGHT: 228 LBS | SYSTOLIC BLOOD PRESSURE: 116 MMHG | BODY MASS INDEX: 34.67 KG/M2 | DIASTOLIC BLOOD PRESSURE: 72 MMHG

## 2022-09-22 DIAGNOSIS — I50.9 CONGESTIVE HEART FAILURE, UNSPECIFIED HF CHRONICITY, UNSPECIFIED HEART FAILURE TYPE (HCC): ICD-10-CM

## 2022-09-22 DIAGNOSIS — K40.30 INCARCERATED LEFT INGUINAL HERNIA: Primary | ICD-10-CM

## 2022-09-22 DIAGNOSIS — E66.01 CLASS 2 SEVERE OBESITY DUE TO EXCESS CALORIES WITH SERIOUS COMORBIDITY AND BODY MASS INDEX (BMI) OF 35.0 TO 35.9 IN ADULT (HCC): ICD-10-CM

## 2022-09-22 DIAGNOSIS — R60.0 EDEMA OF BOTH LOWER LEGS: ICD-10-CM

## 2022-09-22 DIAGNOSIS — I42.0 DILATED CARDIOMYOPATHY (HCC): ICD-10-CM

## 2022-09-22 PROCEDURE — 1123F ACP DISCUSS/DSCN MKR DOCD: CPT | Performed by: SURGERY

## 2022-09-22 PROCEDURE — G8427 DOCREV CUR MEDS BY ELIG CLIN: HCPCS | Performed by: SURGERY

## 2022-09-22 PROCEDURE — 99204 OFFICE O/P NEW MOD 45 MIN: CPT | Performed by: SURGERY

## 2022-09-22 PROCEDURE — 3017F COLORECTAL CA SCREEN DOC REV: CPT | Performed by: SURGERY

## 2022-09-22 PROCEDURE — G8417 CALC BMI ABV UP PARAM F/U: HCPCS | Performed by: SURGERY

## 2022-09-22 PROCEDURE — 1036F TOBACCO NON-USER: CPT | Performed by: SURGERY

## 2022-09-22 RX ORDER — HYDROCODONE BITARTRATE AND ACETAMINOPHEN 7.5; 325 MG/1; MG/1
1 TABLET ORAL EVERY 6 HOURS PRN
Status: ON HOLD | COMMUNITY
End: 2022-09-23 | Stop reason: HOSPADM

## 2022-09-22 ASSESSMENT — ENCOUNTER SYMPTOMS
EYE ITCHING: 0
SINUS PAIN: 0
DIARRHEA: 0
VOMITING: 0
BACK PAIN: 0
WHEEZING: 0
COLOR CHANGE: 0
COUGH: 1
SORE THROAT: 0
NAUSEA: 0
EYE PAIN: 0
CONSTIPATION: 0
ABDOMINAL PAIN: 1
SINUS PRESSURE: 0
EYE REDNESS: 0
EYE DISCHARGE: 0

## 2022-09-22 NOTE — PERIOP NOTE
Spoke with patient and gave arrival time of 1130 for 1400 procedure- all preop instructions reviewed and no further questions at this time.

## 2022-09-22 NOTE — PROGRESS NOTES
Yoon Rosales MD, [facility], [address] [place] [address]  Kerri Bucio  Phone [phone]   Fax [phone]      Date of visit: 9/23/2022     Primary/Requesting provider: Balta Bonner MD    Chief Complaint   Patient presents with    New Patient     NP - ED 9/14/222 - Acute abdominal pain       Patient is a 73 y.o. male who presents for evaluation of a painful LEFT inguinal hernia. He was unaware of any issues in the left groin until last Wednesday when he developed acute onset of left groin pain with coughing, while driving to work. He presented to the ER a few hours later due to persistence of the pain while at work and was diagnosed with an incarcerated left inguinal hernia. He has had persistent pain at the site ever since, which does wax and wane to some degree but is always present. He denies n/v, f/c, changes in bowel/bladder function. He has not noted any swelling/bulge or discomfort on the right side. He has multiple, stable, medical conditions including chronic interstitial lung disease and bronchiectasis which results in paroxysms of coughing to clear mucus. He does not do physical activity at work.     Medications:   Current Outpatient Medications on File Prior to Visit   Medication Sig Dispense Refill    Budeson-Glycopyrrol-Formoterol (BREZTRI AEROSPHERE) 160-9-4.8 MCG/ACT AERO Inhale 2 puffs into the lungs 2 times daily 1 each 11    dapagliflozin (FARXIGA) 10 MG tablet Take 1 tablet by mouth every morning 30 tablet 11    metoprolol succinate (TOPROL XL) 25 MG extended release tablet Take 1 tablet by mouth daily 30 tablet 3    albuterol sulfate  (90 Base) MCG/ACT inhaler Inhale 2 puffs into the lungs every 4 hours as needed      atorvastatin (LIPITOR) 20 MG tablet TAKE 1 TABLET BY MOUTH EVERY DAY      bumetanide (BUMEX) 1 MG tablet Take 1 mg by mouth 2 times daily      ergocalciferol (ERGOCALCIFEROL) 1.25 MG (23117 UT) capsule TAKE 1 CAPSULE BY MOUTH ONCE EVERY 7 DAYS Nintedanib Esylate (OFEV) 150 MG CAPS TAKE 1 CAPSULE BY MOUTH TWICE A DAY 12 HOURS APART WITH FOOD      spironolactone (ALDACTONE) 25 MG tablet Take 25 mg by mouth daily      Tadalafil 2.5 MG TABS Take 1 tablet (2.5 mg) by mouth daily. (Patient not taking: Reported on 9/22/2022)      valsartan (DIOVAN) 160 MG tablet Take 160 mg by mouth daily       No current facility-administered medications on file prior to visit. Allergies: Allergies   Allergen Reactions    Penicillin G Shortness Of Breath        Past History:  Past Medical History:   Diagnosis Date    Aortic regurgitation     mod-severe eccentric-  ECHO 5/5/22; per MD note 8/8/22 not controlled    Bronchiectasis ([address]     Cardiomyopathy ([address]     CHF (congestive heart failure) ([address]     ED (erectile dysfunction)     Edema     H/O coronary angiogram 05/05/2022    cath report: mild LM disease, severe MV, severe LV dysfunction, increased LVEDP. MD note 8/8/22: Cathalene Breach RCA ~90%, other anatomy with mild non-obstructive disease\"    History of transesophageal echocardiography (CARY) 05/05/2022    EF 15-20%, left ventrical severely dilated, AV mod-severe eccentric regurgitation, MVR mild to moderate, RVSP 41 mmHg    Hyperlipidemia     Hypertension     medication    ILD (interstitial lung disease) ([address]     Ofev    Murmur     cardiac note 8/8/22: No definite PND/ orthopnea. Improved sx since initial eval on 4/12/22, down 15-20 pounds, states improved ROOT, persistent lower ext edema, states can walk over a flight of stairs. No chest discomfort. Denies dizziness.     Osteoarthritis of knees, bilateral     Vitamin D deficiency       Past Surgical History:   Procedure Laterality Date    COLONOSCOPY  07/2019    Dr. Alisia Millan, due for repeat in 5 years    ORTHOPEDIC SURGERY Left     left wrist, cyst        Family and Social History:  Family History   Problem Relation Age of Onset    Breast Cancer Sister     Heart Disease Father     Coronary Art Dis Neg Hx      Social History     Socioeconomic History    Marital status: Legally      Spouse name: Not on file    Number of children: Not on file    Years of education: Not on file    Highest education level: Not on file   Occupational History    Not on file   Tobacco Use    Smoking status: Former     Packs/day: 1.00     Years: 20.00     Pack years: 20.00     Types: Cigarettes     Quit date: 2014     Years since quittin.3    Smokeless tobacco: Never   Substance and Sexual Activity    Alcohol use: Yes     Comment: rare    Drug use: No    Sexual activity: Not on file   Other Topics Concern    Not on file   Social History Narrative    Not on file     Social Determinants of Health     Financial Resource Strain: Not on file   Food Insecurity: Not on file   Transportation Needs: Not on file   Physical Activity: Not on file   Stress: Not on file   Social Connections: Not on file   Intimate Partner Violence: Not on file   Housing Stability: Not on file             Review of Systems   Constitutional:  Negative for chills, fatigue and fever. HENT:  Negative for sinus pressure, sinus pain, sneezing and sore throat. Eyes:  Negative for pain, discharge, redness and itching. Glasses    Respiratory:  Positive for cough and shortness of breath. Negative for wheezing. Patient states he has scarring in his lungs   Cardiovascular:  Positive for leg swelling. Negative for chest pain and palpitations. Dr. Plunkett [facility]   Gastrointestinal:  Positive for abdominal pain. Negative for constipation, diarrhea, nausea and vomiting. Endocrine: Negative for cold intolerance, heat intolerance and polydipsia. Genitourinary:  Negative for difficulty urinating, dysuria, flank pain and frequency. Musculoskeletal:  Negative for back pain, myalgias and neck pain. Skin:  Negative for color change, pallor and rash. Allergic/Immunologic: Negative for environmental allergies and food allergies. Neurological:  Negative for dizziness, light-headedness and headaches. Hematological:  Negative for adenopathy. Does not bruise/bleed easily. Psychiatric/Behavioral:  Negative for confusion and sleep disturbance. The patient is not nervous/anxious. Physical Exam  Vitals and nursing note reviewed. HENT:      Head: Normocephalic and atraumatic. Eyes:      General: No scleral icterus. Pupils: Pupils are equal, round, and reactive to light. Cardiovascular:      Rate and Rhythm: Normal rate. Pulmonary:      Effort: Pulmonary effort is normal. No respiratory distress. Breath sounds: No stridor. Abdominal:      General: There is no distension. Palpations: Abdomen is soft. There is no mass. Tenderness: There is no abdominal tenderness. Hernia: A hernia is present. Hernia is present in the left inguinal area (tender, irreducible large hernia. no overlynig erythema). There is no hernia in the right inguinal area. Genitourinary:     Penis: Normal.       Testes: Normal.   Musculoskeletal:      Right lower leg: Edema (severe) present. Left lower leg: Edema (severe) present. Skin:     General: Skin is warm and dry. Neurological:      General: No focal deficit present. Mental Status: He is alert and oriented to person, place, and time. Cranial Nerves: No cranial nerve deficit. Psychiatric:         Mood and Affect: Mood normal.         Behavior: Behavior normal.         Thought Content: Thought content normal.         Judgment: Judgment normal.           ASSESSMENT and PLAN:    1. Incarcerated left inguinal hernia    2. Dilated cardiomyopathy ([facility]    3. Congestive heart failure, unspecified HF chronicity, unspecified heart failure type (HCC)    4. Class 2 severe obesity due to excess calories with serious comorbidity and body mass index (BMI) of 35.0 to 35.9 in adult ([address]    5.  Edema of both lower legs         Discussed need for urgent (not emergent) repair as he is not demonstrating signs of ischemia or obstruction; pt is uncomfortable and prefers intervention ASAP. He has multiple comorbidities which will increase his risk of anesthetic and post-operative complications, of which he is aware. Will proceed with left  incarcerated inguinal  hernia repair with mesh. Technical details of the procedure are reviewed. Risks reviewed include risks of anesthesia, bleeding, infection, visceral injury, persistent post-operative pain, and hernia recurrence. All questions are answered.

## 2022-09-22 NOTE — H&P (VIEW-ONLY)
Daniel Monroy MD, [facility], [address] [place] [address]  Kerri Bucio  Phone [phone]   Fax [phone]      Date of visit: 9/23/2022     Primary/Requesting provider: Marianna Thomas MD    Chief Complaint   Patient presents with    New Patient     NP - ED 9/14/222 - Acute abdominal pain       Patient is a 73 y.o. male who presents for evaluation of a painful LEFT inguinal hernia. He was unaware of any issues in the left groin until last Wednesday when he developed acute onset of left groin pain with coughing, while driving to work. He presented to the ER a few hours later due to persistence of the pain while at work and was diagnosed with an incarcerated left inguinal hernia. He has had persistent pain at the site ever since, which does wax and wane to some degree but is always present. He denies n/v, f/c, changes in bowel/bladder function. He has not noted any swelling/bulge or discomfort on the right side. He has multiple, stable, medical conditions including chronic interstitial lung disease and bronchiectasis which results in paroxysms of coughing to clear mucus. He does not do physical activity at work.     Medications:   Current Outpatient Medications on File Prior to Visit   Medication Sig Dispense Refill    Budeson-Glycopyrrol-Formoterol (BREZTRI AEROSPHERE) 160-9-4.8 MCG/ACT AERO Inhale 2 puffs into the lungs 2 times daily 1 each 11    dapagliflozin (FARXIGA) 10 MG tablet Take 1 tablet by mouth every morning 30 tablet 11    metoprolol succinate (TOPROL XL) 25 MG extended release tablet Take 1 tablet by mouth daily 30 tablet 3    albuterol sulfate  (90 Base) MCG/ACT inhaler Inhale 2 puffs into the lungs every 4 hours as needed      atorvastatin (LIPITOR) 20 MG tablet TAKE 1 TABLET BY MOUTH EVERY DAY      bumetanide (BUMEX) 1 MG tablet Take 1 mg by mouth 2 times daily      ergocalciferol (ERGOCALCIFEROL) 1.25 MG (54830 UT) capsule TAKE 1 CAPSULE BY MOUTH ONCE EVERY 7 DAYS Nintedanib Esylate (OFEV) 150 MG CAPS TAKE 1 CAPSULE BY MOUTH TWICE A DAY 12 HOURS APART WITH FOOD      spironolactone (ALDACTONE) 25 MG tablet Take 25 mg by mouth daily      Tadalafil 2.5 MG TABS Take 1 tablet (2.5 mg) by mouth daily. (Patient not taking: Reported on 9/22/2022)      valsartan (DIOVAN) 160 MG tablet Take 160 mg by mouth daily       No current facility-administered medications on file prior to visit. Allergies: Allergies   Allergen Reactions    Penicillin G Shortness Of Breath        Past History:  Past Medical History:   Diagnosis Date    Aortic regurgitation     mod-severe eccentric-  ECHO 5/5/22; per MD note 8/8/22 not controlled    Bronchiectasis ([address]     Cardiomyopathy ([address]     CHF (congestive heart failure) ([address]     ED (erectile dysfunction)     Edema     H/O coronary angiogram 05/05/2022    cath report: mild LM disease, severe MV, severe LV dysfunction, increased LVEDP. MD note 8/8/22: Martina Miu RCA ~90%, other anatomy with mild non-obstructive disease\"    History of transesophageal echocardiography (CARY) 05/05/2022    EF 15-20%, left ventrical severely dilated, AV mod-severe eccentric regurgitation, MVR mild to moderate, RVSP 41 mmHg    Hyperlipidemia     Hypertension     medication    ILD (interstitial lung disease) ([address]     Ofev    Murmur     cardiac note 8/8/22: No definite PND/ orthopnea. Improved sx since initial eval on 4/12/22, down 15-20 pounds, states improved ROOT, persistent lower ext edema, states can walk over a flight of stairs. No chest discomfort. Denies dizziness.     Osteoarthritis of knees, bilateral     Vitamin D deficiency       Past Surgical History:   Procedure Laterality Date    COLONOSCOPY  07/2019    Dr. Morteza Green, due for repeat in 5 years    ORTHOPEDIC SURGERY Left     left wrist, cyst        Family and Social History:  Family History   Problem Relation Age of Onset    Breast Cancer Sister     Heart Disease Father     Coronary Art Dis Neg Hx      Social History     Socioeconomic History    Marital status: Legally      Spouse name: Not on file    Number of children: Not on file    Years of education: Not on file    Highest education level: Not on file   Occupational History    Not on file   Tobacco Use    Smoking status: Former     Packs/day: 1.00     Years: 20.00     Pack years: 20.00     Types: Cigarettes     Quit date: 2014     Years since quittin.3    Smokeless tobacco: Never   Substance and Sexual Activity    Alcohol use: Yes     Comment: rare    Drug use: No    Sexual activity: Not on file   Other Topics Concern    Not on file   Social History Narrative    Not on file     Social Determinants of Health     Financial Resource Strain: Not on file   Food Insecurity: Not on file   Transportation Needs: Not on file   Physical Activity: Not on file   Stress: Not on file   Social Connections: Not on file   Intimate Partner Violence: Not on file   Housing Stability: Not on file             Review of Systems   Constitutional:  Negative for chills, fatigue and fever. HENT:  Negative for sinus pressure, sinus pain, sneezing and sore throat. Eyes:  Negative for pain, discharge, redness and itching. Glasses    Respiratory:  Positive for cough and shortness of breath. Negative for wheezing. Patient states he has scarring in his lungs   Cardiovascular:  Positive for leg swelling. Negative for chest pain and palpitations. Dr. Chika [facility]   Gastrointestinal:  Positive for abdominal pain. Negative for constipation, diarrhea, nausea and vomiting. Endocrine: Negative for cold intolerance, heat intolerance and polydipsia. Genitourinary:  Negative for difficulty urinating, dysuria, flank pain and frequency. Musculoskeletal:  Negative for back pain, myalgias and neck pain. Skin:  Negative for color change, pallor and rash. Allergic/Immunologic: Negative for environmental allergies and food allergies. he is not demonstrating signs of ischemia or obstruction; pt is uncomfortable and prefers intervention ASAP. He has multiple comorbidities which will increase his risk of anesthetic and post-operative complications, of which he is aware. Will proceed with left  incarcerated inguinal  hernia repair with mesh. Technical details of the procedure are reviewed. Risks reviewed include risks of anesthesia, bleeding, infection, visceral injury, persistent post-operative pain, and hernia recurrence. All questions are answered.

## 2022-09-22 NOTE — PERIOP NOTE
Patient verified name and . Order for consent NOT found in EHR at time of PAT visit. Unable to verify case posting against order; surgery verified by patient. Type 1B surgery, PAT phone assessment complete. Orders not received. Labs per surgeon: unknown; no orders received    ** Per RN note 22 anesthesia has reviewed echo, cath, and last cardiac visit and determined case to be moved downtown. Patient aware case moved to downtown location. Labs per anesthesia protocol: 22 Hgb 16.2, K+ 4.0 WDL of anesthesia protocol. Patient answered medical/surgical history questions at their best of ability. All prior to admission medications documented in [facility]. Patient instructed to take the following medications the day of surgery according to anesthesia guidelines with a small sip of water:use and bring albuterol inhaler. Use Breztri inhaler. Take:atorvastatin, metoprolol, Ofev If needed: Norco Hold all vitamins 7 days prior to surgery and NSAIDS 5 days prior to surgery. Prescription meds to hold:Vitamins and supplements. Do not take farxiga, Bumex, valsartan, or Aldactone on morning of procedure. Patient instructed on the following:    > Arrive at [facility], time of arrival to be called the day before by 1700  > NPO after midnight, unless otherwise indicated, including gum, mints, and ice chips  > Responsible adult must drive patient to the hospital, stay during surgery, and patient will need supervision 24 hours after anesthesia  > Use antibacterial soap in shower the night before surgery and on the morning of surgery  > All piercings must be removed prior to arrival.    > Leave all valuables (money and jewelry) at home but bring insurance card and ID on DOS.   > You may be required to pay a deductible or co-pay on the day of your procedure. You can pre-pay by calling [phone] if your surgery is at the [facility] or [phone] if your surgery is at the [facility].   > Do not wear make-up, nail polish, lotions, cologne, perfumes, powders, or oil on skin. Artificial nails are not permitted.

## 2022-09-23 ENCOUNTER — ANESTHESIA EVENT (OUTPATIENT)
Dept: SURGERY | Age: 67
End: 2022-09-23
Payer: COMMERCIAL

## 2022-09-23 ENCOUNTER — ANESTHESIA (OUTPATIENT)
Dept: SURGERY | Age: 67
End: 2022-09-23
Payer: COMMERCIAL

## 2022-09-23 ENCOUNTER — HOSPITAL ENCOUNTER (OUTPATIENT)
Age: 67
Setting detail: OUTPATIENT SURGERY
Discharge: HOME | End: 2022-09-23
Attending: SURGERY | Admitting: SURGERY
Payer: COMMERCIAL

## 2022-09-23 VITALS
HEART RATE: 81 BPM | BODY MASS INDEX: 34.04 KG/M2 | TEMPERATURE: 98 F | HEIGHT: 68 IN | DIASTOLIC BLOOD PRESSURE: 73 MMHG | WEIGHT: 224.6 LBS | OXYGEN SATURATION: 95 % | RESPIRATION RATE: 15 BRPM | SYSTOLIC BLOOD PRESSURE: 109 MMHG

## 2022-09-23 DIAGNOSIS — K40.30 INCARCERATED LEFT INGUINAL HERNIA: Primary | ICD-10-CM

## 2022-09-23 PROCEDURE — 3600000013 HC SURGERY LEVEL 3 ADDTL 15MIN: Performed by: SURGERY

## 2022-09-23 PROCEDURE — 2500000003 HC RX 250 WO HCPCS: Performed by: SURGERY

## 2022-09-23 PROCEDURE — C1781 MESH (IMPLANTABLE): HCPCS | Performed by: SURGERY

## 2022-09-23 PROCEDURE — 2580000003 HC RX 258: Performed by: NURSE ANESTHETIST, CERTIFIED REGISTERED

## 2022-09-23 PROCEDURE — 7100000000 HC PACU RECOVERY - FIRST 15 MIN: Performed by: SURGERY

## 2022-09-23 PROCEDURE — 7100000011 HC PHASE II RECOVERY - ADDTL 15 MIN: Performed by: SURGERY

## 2022-09-23 PROCEDURE — 2580000003 HC RX 258: Performed by: SURGERY

## 2022-09-23 PROCEDURE — 6360000002 HC RX W HCPCS: Performed by: SURGERY

## 2022-09-23 PROCEDURE — 7100000001 HC PACU RECOVERY - ADDTL 15 MIN: Performed by: SURGERY

## 2022-09-23 PROCEDURE — 6370000000 HC RX 637 (ALT 250 FOR IP): Performed by: ANESTHESIOLOGY

## 2022-09-23 PROCEDURE — 3700000000 HC ANESTHESIA ATTENDED CARE: Performed by: SURGERY

## 2022-09-23 PROCEDURE — 2500000003 HC RX 250 WO HCPCS: Performed by: NURSE ANESTHETIST, CERTIFIED REGISTERED

## 2022-09-23 PROCEDURE — 6360000002 HC RX W HCPCS: Performed by: NURSE ANESTHETIST, CERTIFIED REGISTERED

## 2022-09-23 PROCEDURE — 2709999900 HC NON-CHARGEABLE SUPPLY: Performed by: SURGERY

## 2022-09-23 PROCEDURE — 7100000010 HC PHASE II RECOVERY - FIRST 15 MIN: Performed by: SURGERY

## 2022-09-23 PROCEDURE — 3700000001 HC ADD 15 MINUTES (ANESTHESIA): Performed by: SURGERY

## 2022-09-23 PROCEDURE — 3600000003 HC SURGERY LEVEL 3 BASE: Performed by: SURGERY

## 2022-09-23 PROCEDURE — 49507 PRP I/HERN INIT BLOCK >5 YR: CPT | Performed by: SURGERY

## 2022-09-23 DEVICE — PERFIX PLUG, 1.5" X 2.0" (3.8 CM X 5.0 CM), EXTRA LARGE (CONTENTS: 2)
Type: IMPLANTABLE DEVICE | Site: GROIN | Status: FUNCTIONAL
Brand: PERFIX

## 2022-09-23 RX ORDER — METOPROLOL TARTRATE 5 MG/5ML
INJECTION INTRAVENOUS PRN
Status: DISCONTINUED | OUTPATIENT
Start: 2022-09-23 | End: 2022-09-23 | Stop reason: SDUPTHER

## 2022-09-23 RX ORDER — HYDROMORPHONE HYDROCHLORIDE 2 MG/ML
0.5 INJECTION, SOLUTION INTRAMUSCULAR; INTRAVENOUS; SUBCUTANEOUS EVERY 5 MIN PRN
Status: DISCONTINUED | OUTPATIENT
Start: 2022-09-23 | End: 2022-09-23 | Stop reason: HOSPADM

## 2022-09-23 RX ORDER — FENTANYL CITRATE 50 UG/ML
INJECTION, SOLUTION INTRAMUSCULAR; INTRAVENOUS PRN
Status: DISCONTINUED | OUTPATIENT
Start: 2022-09-23 | End: 2022-09-23 | Stop reason: SDUPTHER

## 2022-09-23 RX ORDER — OXYCODONE HYDROCHLORIDE 5 MG/1
5 TABLET ORAL PRN
Status: COMPLETED | OUTPATIENT
Start: 2022-09-23 | End: 2022-09-23

## 2022-09-23 RX ORDER — SODIUM CHLORIDE, SODIUM LACTATE, POTASSIUM CHLORIDE, CALCIUM CHLORIDE 600; 310; 30; 20 MG/100ML; MG/100ML; MG/100ML; MG/100ML
INJECTION, SOLUTION INTRAVENOUS CONTINUOUS PRN
Status: DISCONTINUED | OUTPATIENT
Start: 2022-09-23 | End: 2022-09-23 | Stop reason: SDUPTHER

## 2022-09-23 RX ORDER — HYDROMORPHONE HYDROCHLORIDE 2 MG/ML
0.25 INJECTION, SOLUTION INTRAMUSCULAR; INTRAVENOUS; SUBCUTANEOUS EVERY 5 MIN PRN
Status: DISCONTINUED | OUTPATIENT
Start: 2022-09-23 | End: 2022-09-23 | Stop reason: HOSPADM

## 2022-09-23 RX ORDER — ONDANSETRON 2 MG/ML
4 INJECTION INTRAMUSCULAR; INTRAVENOUS
Status: DISCONTINUED | OUTPATIENT
Start: 2022-09-23 | End: 2022-09-23 | Stop reason: HOSPADM

## 2022-09-23 RX ORDER — BUPIVACAINE HYDROCHLORIDE AND EPINEPHRINE 5; 5 MG/ML; UG/ML
INJECTION, SOLUTION EPIDURAL; INTRACAUDAL; PERINEURAL PRN
Status: DISCONTINUED | OUTPATIENT
Start: 2022-09-23 | End: 2022-09-23 | Stop reason: HOSPADM

## 2022-09-23 RX ORDER — SODIUM CHLORIDE, SODIUM LACTATE, POTASSIUM CHLORIDE, CALCIUM CHLORIDE 600; 310; 30; 20 MG/100ML; MG/100ML; MG/100ML; MG/100ML
INJECTION, SOLUTION INTRAVENOUS CONTINUOUS
Status: DISCONTINUED | OUTPATIENT
Start: 2022-09-23 | End: 2022-09-23 | Stop reason: HOSPADM

## 2022-09-23 RX ORDER — ONDANSETRON 2 MG/ML
INJECTION INTRAMUSCULAR; INTRAVENOUS PRN
Status: DISCONTINUED | OUTPATIENT
Start: 2022-09-23 | End: 2022-09-23 | Stop reason: SDUPTHER

## 2022-09-23 RX ORDER — ETOMIDATE 2 MG/ML
INJECTION INTRAVENOUS PRN
Status: DISCONTINUED | OUTPATIENT
Start: 2022-09-23 | End: 2022-09-23 | Stop reason: SDUPTHER

## 2022-09-23 RX ORDER — ROCURONIUM BROMIDE 10 MG/ML
INJECTION, SOLUTION INTRAVENOUS PRN
Status: DISCONTINUED | OUTPATIENT
Start: 2022-09-23 | End: 2022-09-23 | Stop reason: SDUPTHER

## 2022-09-23 RX ORDER — PROCHLORPERAZINE EDISYLATE 5 MG/ML
5 INJECTION INTRAMUSCULAR; INTRAVENOUS
Status: DISCONTINUED | OUTPATIENT
Start: 2022-09-23 | End: 2022-09-23 | Stop reason: HOSPADM

## 2022-09-23 RX ORDER — HYDROCODONE BITARTRATE AND ACETAMINOPHEN 5; 325 MG/1; MG/1
1 TABLET ORAL EVERY 4 HOURS PRN
Qty: 20 TABLET | Refills: 0 | Status: SHIPPED | OUTPATIENT
Start: 2022-09-23 | End: 2022-09-28

## 2022-09-23 RX ORDER — LIDOCAINE HYDROCHLORIDE 20 MG/ML
INJECTION, SOLUTION EPIDURAL; INFILTRATION; INTRACAUDAL; PERINEURAL PRN
Status: DISCONTINUED | OUTPATIENT
Start: 2022-09-23 | End: 2022-09-23 | Stop reason: SDUPTHER

## 2022-09-23 RX ORDER — DIPHENHYDRAMINE HYDROCHLORIDE 50 MG/ML
12.5 INJECTION INTRAMUSCULAR; INTRAVENOUS
Status: DISCONTINUED | OUTPATIENT
Start: 2022-09-23 | End: 2022-09-23 | Stop reason: HOSPADM

## 2022-09-23 RX ORDER — DEXAMETHASONE SODIUM PHOSPHATE 10 MG/ML
INJECTION INTRAMUSCULAR; INTRAVENOUS PRN
Status: DISCONTINUED | OUTPATIENT
Start: 2022-09-23 | End: 2022-09-23 | Stop reason: SDUPTHER

## 2022-09-23 RX ORDER — OXYCODONE HYDROCHLORIDE 5 MG/1
10 TABLET ORAL PRN
Status: COMPLETED | OUTPATIENT
Start: 2022-09-23 | End: 2022-09-23

## 2022-09-23 RX ADMIN — Medication 2 G: at 13:33

## 2022-09-23 RX ADMIN — LIDOCAINE HYDROCHLORIDE 100 MG: 20 INJECTION, SOLUTION EPIDURAL; INFILTRATION; INTRACAUDAL; PERINEURAL at 13:23

## 2022-09-23 RX ADMIN — FENTANYL CITRATE 50 MCG: 50 INJECTION, SOLUTION INTRAMUSCULAR; INTRAVENOUS at 13:17

## 2022-09-23 RX ADMIN — OXYCODONE 10 MG: 5 TABLET ORAL at 15:34

## 2022-09-23 RX ADMIN — PHENYLEPHRINE HYDROCHLORIDE 200 MCG: 10 INJECTION INTRAVENOUS at 13:41

## 2022-09-23 RX ADMIN — SUGAMMADEX 200 MG: 100 INJECTION, SOLUTION INTRAVENOUS at 14:40

## 2022-09-23 RX ADMIN — ROCURONIUM BROMIDE 30 MG: 50 INJECTION, SOLUTION INTRAVENOUS at 13:23

## 2022-09-23 RX ADMIN — METOPROLOL TARTRATE 2 MG: 1 INJECTION, SOLUTION INTRAVENOUS at 14:18

## 2022-09-23 RX ADMIN — PHENYLEPHRINE HYDROCHLORIDE 100 MCG: 10 INJECTION INTRAVENOUS at 13:31

## 2022-09-23 RX ADMIN — PHENYLEPHRINE HYDROCHLORIDE 100 MCG: 10 INJECTION INTRAVENOUS at 13:45

## 2022-09-23 RX ADMIN — DEXAMETHASONE SODIUM PHOSPHATE 4 MG: 10 INJECTION INTRAMUSCULAR; INTRAVENOUS at 13:27

## 2022-09-23 RX ADMIN — ROCURONIUM BROMIDE 10 MG: 50 INJECTION, SOLUTION INTRAVENOUS at 14:09

## 2022-09-23 RX ADMIN — ROCURONIUM BROMIDE 10 MG: 50 INJECTION, SOLUTION INTRAVENOUS at 14:01

## 2022-09-23 RX ADMIN — SODIUM CHLORIDE, POTASSIUM CHLORIDE, SODIUM LACTATE AND CALCIUM CHLORIDE: 600; 310; 30; 20 INJECTION, SOLUTION INTRAVENOUS at 12:41

## 2022-09-23 RX ADMIN — PHENYLEPHRINE HYDROCHLORIDE 100 MCG: 10 INJECTION INTRAVENOUS at 13:35

## 2022-09-23 RX ADMIN — ETOMIDATE 24 MG: 2 INJECTION, SOLUTION INTRAVENOUS at 13:23

## 2022-09-23 RX ADMIN — FENTANYL CITRATE 50 MCG: 50 INJECTION, SOLUTION INTRAMUSCULAR; INTRAVENOUS at 13:27

## 2022-09-23 RX ADMIN — SODIUM CHLORIDE, SODIUM LACTATE, POTASSIUM CHLORIDE, AND CALCIUM CHLORIDE: 600; 310; 30; 20 INJECTION, SOLUTION INTRAVENOUS at 13:16

## 2022-09-23 RX ADMIN — ONDANSETRON 4 MG: 2 INJECTION INTRAMUSCULAR; INTRAVENOUS at 13:27

## 2022-09-23 ASSESSMENT — ENCOUNTER SYMPTOMS
SHORTNESS OF BREATH: 1
SHORTNESS OF BREATH: 1

## 2022-09-23 ASSESSMENT — PAIN - FUNCTIONAL ASSESSMENT: PAIN_FUNCTIONAL_ASSESSMENT: 0-10

## 2022-09-23 NOTE — OP NOTE
Operative Report    Patient: [facility] MRN: [id number]     YOB: 1955  Age: 73 y.o. Sex: male          Preoperative Diagnosis: Incarcerated left inguinal hernia [K40.30]     Postoperative Diagnosis: same    Procedure:   left incarcerated direct Inguinal Hernia Repair with PerFix Plug     Anesthesia: General     Complications: none    Indications:  As outlined in History and Physical.    Procedure Details   Informed consent was obtained and the patient was brought to the operating room and placed on the table in a supine position. After successful induction of anesthesia, the groin was prepped and draped in the standard fashion. Final attempt made to reduce the hernia which was unsuccessful. An incision was made in the suprainguinal region and was carried down through the subcutaneous tissues with cautery to the external oblique aponeurosis which was incised parallel to its fibers including opening through the external ring. two nerve(s) was/were identified laterally penetrating the aponeurosis and both were divided to allow exposure to the hernia. The cord and massive hernia structures were mobilized at the level of the pubic tubercle and isolated with a penrose drain. The hernia sac was freed from the cord at the level of the tubercle, then the cord structures were isolated with a Penrose drain. The cord was then dissected in its proximal third and an indirect sac was not identified. With extensive mobilization from the cord, the extremely large sac was freed. There was no visible evidence of ischemia of the structures within the sac. A very large direct defect was noted. The direct sac was dissected from the surrounding structures with cautery and it was inverted into the preperitoneal space which was further developed with insertion of a sponge.   An extra large  PerFix plug was placed into the preperitoneal space and the inner petals of the plug were sutured to the surrounding conjoined tendon tissue with 2-0 Prolene. Once this was accomplished, the patch was placed on the floor of the inguinal canal and sutured in place with 2-0 Prolene to the pubic tubercle medially, the inguinal ligament inferiorly, the rectus fascia superiorly, and laterally the upper leaf was crossed over the lower leaf and sutured to the inguinal ligament to provide a valve-like reconstruction of the internal ring. The cord was then returned to its normal anatomic position. The wound was irrigated, made hemostatic as necessary with cautery, and infiltrated with local.  The external oblique was then reapproximated with 2-0 vicryl to recreate the external ring, Yi's fascia was closed with 3-0 Vicryl  and the skin with subcuticular 4-0 Vicryl. Steri-Strips, telfa, and a tegaderm dressing was applied. The patient tolerated the procedure well and was awakened from anesthesia and taken to recovery in satisfactory condition. Sponge, needle, and instrument counts were correct as reported to me.     Daniel Tomlinson MD  September 23, 2022

## 2022-09-23 NOTE — ANESTHESIA PRE PROCEDURE
Department of Anesthesiology  Preprocedure Note       Name:  [facility]   Age:  73 y.o.  :  1955                                          MRN:  [id number]         Date:  2022      Surgeon: Jing Fitzgerald):  Anuj Sarah MD    Procedure: Procedure(s):  INCARCERATED LEFT INGUINAL HERNIA REPAIR W/ MESH    Medications prior to admission:   Prior to Admission medications    Medication Sig Start Date End Date Taking? Authorizing Provider   HYDROcodone-acetaminophen (NORCO) 7.5-325 MG per tablet Take 1 tablet by mouth every 6 hours as needed for Pain. Yes Historical Provider, MD   Budeson-Glycopyrrol-Formoterol (BREZTRI AEROSPHERE) 160-9-4.8 MCG/ACT AERO Inhale 2 puffs into the lungs 2 times daily 8/15/22   FELIPE Mike   dapagliflozin (FARXIGA) 10 MG tablet Take 1 tablet by mouth every morning 22   Leigha Desir MD   metoprolol succinate (TOPROL XL) 25 MG extended release tablet Take 1 tablet by mouth daily 22   Leigha Desir MD   albuterol sulfate  (90 Base) MCG/ACT inhaler Inhale 2 puffs into the lungs every 4 hours as needed 21   Ar Automatic Reconciliation   atorvastatin (LIPITOR) 20 MG tablet TAKE 1 TABLET BY MOUTH EVERY DAY 10/27/21   Ar Automatic Reconciliation   bumetanide (BUMEX) 1 MG tablet Take 1 mg by mouth 2 times daily 22   Ar Automatic Reconciliation   ergocalciferol (ERGOCALCIFEROL) 1.25 MG (52259 UT) capsule TAKE 1 CAPSULE BY MOUTH ONCE EVERY 7 DAYS 22   Ar Automatic Reconciliation   Nintedanib Esylate (OFEV) 150 MG CAPS TAKE 1 CAPSULE BY MOUTH TWICE A DAY 12 HOURS APART WITH FOOD 21   Ar Automatic Reconciliation   spironolactone (ALDACTONE) 25 MG tablet Take 25 mg by mouth daily 22  Ar Automatic Reconciliation   Tadalafil 2.5 MG TABS Take 1 tablet (2.5 mg) by mouth daily.   Patient not taking: Reported on 2022 10/1/20   Ar Automatic Reconciliation   valsartan (DIOVAN) 160 MG tablet Take 160 mg by mouth daily 10/27/21   Ar Automatic Reconciliation       Current medications:    Current Facility-Administered Medications   Medication Dose Route Frequency Provider Last Rate Last Admin    ceFAZolin (ANCEF) 2000 mg in sterile water 20 mL IV syringe  2,000 mg IntraVENous On Call Shana Costa MD           Allergies: Allergies   Allergen Reactions    Penicillin G Shortness Of Breath       Problem List:    Patient Active Problem List   Diagnosis Code    Dyspnea R06.00    Benign essential hypertension I10    Diverticulosis of intestine K57.90    History of smoking 25-50 pack years Z87.891    Male erectile disorder N52.9    ILD (interstitial lung disease) ([address] J84.9    Vasculogenic erectile dysfunction N52.9    Bronchiectasis without complication ([facility] [address] S30.8    Hyperlipidemia E78.5    Early syphilis, 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    Class 2 severe obesity due to excess calories with serious comorbidity and body mass index (BMI) of 35.0 to 35.9 in adult ([facility]) E66.01, Z68.35    Clubbing of fingers R68.3    Primary osteoarthritis of both knees M17.0    Pulmonary infiltrates R91.8    Benign neoplasm of colon D12.6    Vitamin D deficiency E55.9    Dilated cardiomyopathy ([facility]) I42.0    CHF (congestive heart failure) ([facility]) I50.9    Incarcerated left inguinal hernia K40.30       Past Medical History:        Diagnosis Date    Aortic regurgitation     mod-severe eccentric-  ECHO 5/5/22; per MD note 8/8/22 not controlled    Bronchiectasis ([facility] [address]     Cardiomyopathy ([facility] [address]     CHF (congestive heart failure) ([facility] [address]     ED (erectile dysfunction)     Edema     H/O coronary angiogram 05/05/2022    cath report: mild LM disease, severe MV, severe LV dysfunction, increased LVEDP.  MD note 8/8/22: Elmerolos Gold RCA ~90%, other anatomy with mild non-obstructive disease\"    History of transesophageal echocardiography ([facility]) 05/05/2022    EF 15-20%, left ventrical severely dilated, AV mod-severe eccentric regurgitation, MVR mild to moderate, RVSP 41 mmHg    Hyperlipidemia     Hypertension     medication    ILD (interstitial lung disease) ([facility] [address]     Ofev    Murmur     cardiac note 22: No definite PND/ orthopnea. Improved sx since initial eval on 22, down 15-20 pounds, states improved ROOT, persistent lower ext edema, states can walk over a flight of stairs. No chest discomfort. Denies dizziness.  Osteoarthritis of knees, bilateral     Vitamin D deficiency        Past Surgical History:        Procedure Laterality Date    COLONOSCOPY  2019    Dr. James Jim, due for repeat in 5 years    ORTHOPEDIC SURGERY Left     left wrist, cyst       Social History:    Social History     Tobacco Use    Smoking status: Former     Packs/day: 1.00     Years: 20.00     Pack years: 20.00     Types: Cigarettes     Quit date: 2014     Years since quittin.3    Smokeless tobacco: Never   Substance Use Topics    Alcohol use: Yes     Comment: rare                                Counseling given: Not Answered      Vital Signs (Current):   Vitals:    22 1326 22 1159 22 1204   BP:  109/65    Pulse:  (!) 104 (!) 104   Resp:  15    Temp:  97.6 °F (36.4 °C)    TempSrc:  Oral    SpO2:  98%    Weight: 227 lb (103 kg) 224 lb 9.6 oz (101.9 kg)    Height: 5' 8\" (1.727 m) 5' 8\" (1.727 m)                                               BP Readings from Last 3 Encounters:   22 109/65   22 116/72   22 122/81       NPO Status: Time of last liquid consumption: 1800                        Time of last solid consumption: 1800                        Date of last liquid consumption: 22                        Date of last solid food consumption: 22    BMI:   Wt Readings from Last 3 Encounters:   22 224 lb 9.6 oz (101.9 kg)   22 228 lb (103.4 kg)   22 238 lb (108 kg)     Body mass index is 34.15 kg/m².     CBC:   Lab Results   Component Value Date/Time    WBC 7.3 2022 05:33 PM    RBC 4.90 09/14/2022 05:33 PM    HGB 16.2 09/14/2022 05:33 PM    HCT 49.7 09/14/2022 05:33 PM    .4 09/14/2022 05:33 PM    RDW 13.1 09/14/2022 05:33 PM     09/14/2022 05:33 PM       CMP:   Lab Results   Component Value Date/Time     09/14/2022 05:33 PM    K 4.0 09/14/2022 05:33 PM     09/14/2022 05:33 PM    CO2 24 09/14/2022 05:33 PM    BUN 10 09/14/2022 05:33 PM    CREATININE 1.34 09/14/2022 05:33 PM    GFRAA >60 09/14/2022 05:33 PM    AGRATIO 0.9 05/05/2022 08:08 AM    LABGLOM 57 09/14/2022 05:33 PM    GLUCOSE 107 09/14/2022 05:33 PM    PROT 7.4 09/14/2022 05:33 PM    CALCIUM 9.8 09/14/2022 05:33 PM    BILITOT 2.2 09/14/2022 05:33 PM    ALKPHOS 92 09/14/2022 05:33 PM    ALKPHOS 77 05/05/2022 08:08 AM    AST 15 09/14/2022 05:33 PM    ALT 17 09/14/2022 05:33 PM       POC Tests: No results for input(s): POCGLU, POCNA, POCK, POCCL, POCBUN, POCHEMO, POCHCT in the last 72 hours.     Coags: No results found for: PROTIME, INR, APTT    HCG (If Applicable): No results found for: PREGTESTUR, PREGSERUM, HCG, HCGQUANT     ABGs: No results found for: PHART, PO2ART, FFR5IGK, VYN9WZG, BEART, K8VMFIGF     Type & Screen (If Applicable):  No results found for: LABABO, LABRH    Drug/Infectious Status (If Applicable):  No results found for: HIV, HEPCAB    COVID-19 Screening (If Applicable): No results found for: COVID19        Anesthesia Evaluation  Patient summary reviewed and Nursing notes reviewed  Airway: Mallampati: III  TM distance: >3 FB   Neck ROM: full  Mouth opening: > = 3 FB   Dental:    (+) edentulous      Pulmonary: breath sounds clear to auscultation  (+) shortness of breath:                             Cardiovascular:  Exercise tolerance: Reports able to climb almost 2 flights of stairs without stopping  (+) hypertension:, CHF: systolic,     (-) orthopnea and PND      Rhythm: regular  Rate: normal                    Neuro/Psych:               GI/Hepatic/Renal:             Endo/Other: Abdominal:             Vascular: Other Findings:           Anesthesia Plan      general     ASA 3       Induction: intravenous. MIPS: Postoperative opioids intended and Prophylactic antiemetics administered. Anesthetic plan and risks discussed with patient and spouse.                         Tish Guevara MD   9/23/2022

## 2022-09-23 NOTE — DISCHARGE INSTRUCTIONS
Porfirio Lai M.D.  [phone]    Instructions following Hernia Repair:    ACTIVITY:  Try to take a few short walks with help around the house later today. It is very important to take short walks to avoid blood clots and pneumonia. You may be light-headed or sleepy from anesthesia, so be careful going up and down stairs. Avoid any activity that involves lifting/pushing more than 30 pounds until your followup appointment  DIET:  Drink only clear, non-carbonated liquids when you first get home (sugar-free if you are diabetic), such as Gatorade, chicken broth, etc.  Later  you may resume a more normal diet, depending on how you feel  Using Miralax or other over the counter laxative is ok if you develop constipation    PAIN:  You will be given a prescription for pain medication. Try to take the pain medication with food, even a few crackers. You may also use Tylenol, Motrin, Advil, or Aleve instead of the prescription pain medication. Do no take Tylenol and the prescription pain medication within 6 hours of each other. URINARY RETENTION: If you are unable to empty your bladder within 6 hours after returning home, please go to your nearest Emergency Department or Urgent Care for urinary catheterization. WOUND CARE:  You may shower the day after surgery, unless instructed otherwise. It is not uncommon for the incisions to ooze or drain blood-tinged fluid. You may remove the clear dressing on the fifth postoperative day. Incisions will sometimes develop redness around them, up to the size of a quarter, as well as a hard lumpy feel. If this redness continues to get larger, please call the office. FOLLOW UP:  Your follow-up appointment is usually made when your surgery is arranged. Please call the office if you are not sure of this appointment. CALL THE DOCTOR IF:  You have a temperature higher than 101.5° Fahrenheit for more than 6 hours. You have severe nausea or vomiting.   You develop increasing redness or infection at the incision. Continue home medications as previously prescribed. MEDICATION INTERACTION:  During your procedure you potentially received a medication or medications which may reduce the effectiveness of oral contraceptives. Please consider other forms of contraception for 1 month following your procedure if you are currently using oral contraceptives as your primary form of birth control. In addition to this, we recommend continuing your oral contraceptive as prescribed, unless otherwise instructed by your physician, during this time    After general anesthesia or intravenous sedation, for 24 hours or while taking prescription Narcotics:  Limit your activities  A responsible adult needs to be with you for the next 24 hours  Do not drive and operate hazardous machinery  Do not make important personal or business decisions  Do not drink alcoholic beverages  If you have not urinated within 8 hours after discharge, and you are experiencing discomfort from urinary retention, please go to the nearest ED. If you have sleep apnea and have a CPAP machine, please use it for all naps and sleeping. Please use caution when taking narcotics and any of your home medications that may cause drowsiness. *  Please give a list of your current medications to your Primary Care Provider. *  Please update this list whenever your medications are discontinued, doses are      changed, or new medications (including over-the-counter products) are added. *  Please carry medication information at all times in case of emergency situations. These are general instructions for a healthy lifestyle:  No smoking/ No tobacco products/ Avoid exposure to second hand smoke  Surgeon General's Warning:  Quitting smoking now greatly reduces serious risk to your health.   Obesity, smoking, and sedentary lifestyle greatly increases your risk for illness  A healthy diet, regular physical exercise & weight monitoring are important for maintaining a healthy lifestyle    You may be retaining fluid if you have a history of heart failure or if you experience any of the following symptoms:  Weight gain of 3 pounds or more overnight or 5 pounds in a week, increased swelling in our hands or feet or shortness of breath while lying flat in bed. Please call your doctor as soon as you notice any of these symptoms; do not wait until your next office visit.

## 2022-09-23 NOTE — PERIOP NOTE
Discharge instructions reviewed with pt and family member who verbalize understanding of follow up care. All questions answered.

## 2022-09-24 NOTE — ANESTHESIA POSTPROCEDURE EVALUATION
Department of Anesthesiology  Postprocedure Note    Patient: Mine Adams  MRN: [id number]  YOB: 1955  Date of evaluation: 9/23/2022      Procedure Summary     Date: 09/23/22 Room / Location: [facility] MAIN OR  / [facility] MAIN OR    Anesthesia Start: 1314 Anesthesia Stop: 1459    Procedure: INCARCERATED LEFT INGUINAL HERNIA REPAIR W/ MESH (Left: Groin) Diagnosis:       Incarcerated left inguinal hernia      (Incarcerated left inguinal hernia [K40.30])    Providers: Gladis Webb MD Responsible Provider: Alexandra Wiseman MD    Anesthesia Type: general ASA Status: 3          Anesthesia Type: No value filed.     Francia Phase I: Francia Score: 10    Francia Phase II:        Anesthesia Post Evaluation    Patient location during evaluation: PACU  Patient participation: complete - patient participated  Level of consciousness: awake and alert  Airway patency: patent  Nausea & Vomiting: no nausea  Complications: no  Cardiovascular status: blood pressure returned to baseline  Respiratory status: acceptable  Hydration status: euvolemic  Multimodal analgesia pain management approach

## 2022-09-27 ASSESSMENT — ENCOUNTER SYMPTOMS
VOMITING: 0
SHORTNESS OF BREATH: 0
CONSTIPATION: 0
CHEST TIGHTNESS: 0
NAUSEA: 0
GASTROINTESTINAL NEGATIVE: 1
RESPIRATORY NEGATIVE: 1
ABDOMINAL PAIN: 0

## 2022-09-27 NOTE — PROGRESS NOTES
[address] [facility]  [phone]        Name: Lanita Gitelman [facility]      MRN: [id number]       : 1955             PCP: Jennifer Becerra MD       CC:  No chief complaint on file. HPI:  Alyson Gonzalez is a 73y.o. year-old male who presents for a post-op visit s/p left direct Inguinal Hernia Repair with PerFix Plug due to incarcerated hernia done per Dr. Triston Giordano on 2022. Patient reports he is doing well. He is tolerating a regular diet and having normal BM's and voiding without difficulty. Pt denies any drainage from incision site. Patient denies any fevers, chills, nausea, vomiting, dysuria or scrotal pain. Objective:    Review of Systems   Constitutional:  Negative for chills and fever. Respiratory: Negative. Negative for chest tightness and shortness of breath. Cardiovascular: Negative. Negative for chest pain and palpitations. Gastrointestinal: Negative. Negative for abdominal pain, constipation, nausea and vomiting. Genitourinary: Negative. Negative for difficulty urinating, dysuria, frequency and hematuria. Physical Exam  Constitutional:       Appearance: Normal appearance. Cardiovascular:      Rate and Rhythm: Normal rate and regular rhythm. Pulses: Normal pulses. Heart sounds: Normal heart sounds. Pulmonary:      Effort: Pulmonary effort is normal.      Breath sounds: Normal breath sounds. Abdominal:      General: Bowel sounds are normal.      Palpations: Abdomen is soft. Comments: Incisional area: Left inguinal area approximated and healing well with no erythema or drainage noted. Musculoskeletal:      Cervical back: Normal range of motion. Neurological:      Mental Status: He is alert.          Assessment/Plan:  73y.o. year-old male who presents for a post-op visit s/p left direct Inguinal Hernia Repair with PerFix Plug done per Dr. Triston Giordano on 2022.      -Follow up PRN  -Resume activity as tolerated  -Diet as tolerated  -Keep incisional area clean and dry    MAYITO Thibodeaux - NP

## 2022-10-06 ENCOUNTER — OFFICE VISIT (OUTPATIENT)
Dept: SURGERY | Age: 67
End: 2022-10-06

## 2022-10-06 DIAGNOSIS — Z09 POSTOPERATIVE EXAMINATION: Primary | ICD-10-CM

## 2022-10-06 PROCEDURE — 99024 POSTOP FOLLOW-UP VISIT: CPT | Performed by: NURSE PRACTITIONER

## 2022-10-06 NOTE — PATIENT INSTRUCTIONS
-Follow up PRN  -Resume activity as tolerated  -Diet as tolerated  -Keep incisional area clean and dry

## 2022-11-02 DIAGNOSIS — E78.5 HYPERLIPIDEMIA, UNSPECIFIED HYPERLIPIDEMIA TYPE: ICD-10-CM

## 2022-11-02 DIAGNOSIS — E55.9 VITAMIN D DEFICIENCY: ICD-10-CM

## 2022-11-02 DIAGNOSIS — I50.9 HEART FAILURE, UNSPECIFIED HF CHRONICITY, UNSPECIFIED HEART FAILURE TYPE (HCC): ICD-10-CM

## 2022-11-02 DIAGNOSIS — N52.9 ERECTILE DYSFUNCTION, UNSPECIFIED ERECTILE DYSFUNCTION TYPE: ICD-10-CM

## 2022-11-02 DIAGNOSIS — E78.5 HYPERLIPIDEMIA, UNSPECIFIED HYPERLIPIDEMIA TYPE: Primary | ICD-10-CM

## 2022-11-02 DIAGNOSIS — I10 ESSENTIAL (PRIMARY) HYPERTENSION: ICD-10-CM

## 2022-11-02 DIAGNOSIS — E66.01 MORBID (SEVERE) OBESITY DUE TO EXCESS CALORIES (HCC): ICD-10-CM

## 2022-11-02 LAB
25(OH)D3 SERPL-MCNC: 110.2 NG/ML (ref 30–100)
ALBUMIN SERPL-MCNC: 3.4 G/DL (ref 3.2–4.6)
ALBUMIN/GLOB SERPL: 1 {RATIO} (ref 0.4–1.6)
ALP SERPL-CCNC: 108 U/L (ref 50–136)
ALT SERPL-CCNC: 22 U/L (ref 12–65)
ANION GAP SERPL CALC-SCNC: 6 MMOL/L (ref 2–11)
AST SERPL-CCNC: 7 U/L (ref 15–37)
BASOPHILS # BLD: 0.1 K/UL (ref 0–0.2)
BASOPHILS NFR BLD: 1 % (ref 0–2)
BILIRUB SERPL-MCNC: 2.3 MG/DL (ref 0.2–1.1)
BUN SERPL-MCNC: 18 MG/DL (ref 8–23)
CALCIUM SERPL-MCNC: 9.5 MG/DL (ref 8.3–10.4)
CHLORIDE SERPL-SCNC: 105 MMOL/L (ref 101–110)
CHOLEST SERPL-MCNC: 114 MG/DL
CO2 SERPL-SCNC: 29 MMOL/L (ref 21–32)
CREAT SERPL-MCNC: 1.6 MG/DL (ref 0.8–1.5)
DIFFERENTIAL METHOD BLD: ABNORMAL
EOSINOPHIL # BLD: 0 K/UL (ref 0–0.8)
EOSINOPHIL NFR BLD: 1 % (ref 0.5–7.8)
ERYTHROCYTE [DISTWIDTH] IN BLOOD BY AUTOMATED COUNT: 14.3 % (ref 11.9–14.6)
GLOBULIN SER CALC-MCNC: 3.4 G/DL (ref 2.8–4.5)
GLUCOSE SERPL-MCNC: 98 MG/DL (ref 65–100)
HCT VFR BLD AUTO: 44.9 % (ref 41.1–50.3)
HDLC SERPL-MCNC: 48 MG/DL (ref 40–60)
HDLC SERPL: 2.4 {RATIO}
HGB BLD-MCNC: 14.2 G/DL (ref 13.6–17.2)
IMM GRANULOCYTES # BLD AUTO: 0 K/UL (ref 0–0.5)
IMM GRANULOCYTES NFR BLD AUTO: 0 % (ref 0–5)
LDLC SERPL CALC-MCNC: 53.8 MG/DL
LYMPHOCYTES # BLD: 2 K/UL (ref 0.5–4.6)
LYMPHOCYTES NFR BLD: 30 % (ref 13–44)
MCH RBC QN AUTO: 33.3 PG (ref 26.1–32.9)
MCHC RBC AUTO-ENTMCNC: 31.6 G/DL (ref 31.4–35)
MCV RBC AUTO: 105.2 FL (ref 82–102)
MONOCYTES # BLD: 0.7 K/UL (ref 0.1–1.3)
MONOCYTES NFR BLD: 10 % (ref 4–12)
NEUTS SEG # BLD: 3.7 K/UL (ref 1.7–8.2)
NEUTS SEG NFR BLD: 58 % (ref 43–78)
NRBC # BLD: 0 K/UL (ref 0–0.2)
PLATELET # BLD AUTO: 179 K/UL (ref 150–450)
PMV BLD AUTO: 10 FL (ref 9.4–12.3)
POTASSIUM SERPL-SCNC: 4 MMOL/L (ref 3.5–5.1)
PROT SERPL-MCNC: 6.8 G/DL (ref 6.3–8.2)
PSA SERPL-MCNC: 0.6 NG/ML
RBC # BLD AUTO: 4.27 M/UL (ref 4.23–5.6)
SODIUM SERPL-SCNC: 140 MMOL/L (ref 133–143)
T4 FREE SERPL-MCNC: 1.7 NG/DL (ref 0.78–1.46)
TRIGL SERPL-MCNC: 61 MG/DL (ref 35–150)
TSH, 3RD GENERATION: 1.02 UIU/ML (ref 0.36–3.74)
VLDLC SERPL CALC-MCNC: 12.2 MG/DL (ref 6–23)
WBC # BLD AUTO: 6.5 K/UL (ref 4.3–11.1)

## 2022-11-09 ENCOUNTER — OFFICE VISIT (OUTPATIENT)
Dept: INTERNAL MEDICINE CLINIC | Facility: CLINIC | Age: 67
End: 2022-11-09
Payer: COMMERCIAL

## 2022-11-09 VITALS
BODY MASS INDEX: 34.65 KG/M2 | OXYGEN SATURATION: 100 % | SYSTOLIC BLOOD PRESSURE: 105 MMHG | TEMPERATURE: 98.3 F | HEIGHT: 68 IN | RESPIRATION RATE: 16 BRPM | DIASTOLIC BLOOD PRESSURE: 68 MMHG | HEART RATE: 89 BPM | WEIGHT: 228.6 LBS

## 2022-11-09 DIAGNOSIS — E05.90 HYPERTHYROIDISM: ICD-10-CM

## 2022-11-09 DIAGNOSIS — I42.0 DILATED CARDIOMYOPATHY (HCC): ICD-10-CM

## 2022-11-09 DIAGNOSIS — E78.2 MIXED HYPERLIPIDEMIA: ICD-10-CM

## 2022-11-09 DIAGNOSIS — J84.9 ILD (INTERSTITIAL LUNG DISEASE) (HCC): ICD-10-CM

## 2022-11-09 DIAGNOSIS — J47.9 BRONCHIECTASIS WITHOUT COMPLICATION (HCC): ICD-10-CM

## 2022-11-09 DIAGNOSIS — I10 BENIGN ESSENTIAL HYPERTENSION: Primary | ICD-10-CM

## 2022-11-09 DIAGNOSIS — I50.9 CONGESTIVE HEART FAILURE, UNSPECIFIED HF CHRONICITY, UNSPECIFIED HEART FAILURE TYPE (HCC): ICD-10-CM

## 2022-11-09 DIAGNOSIS — E55.9 VITAMIN D DEFICIENCY: ICD-10-CM

## 2022-11-09 DIAGNOSIS — E66.09 CLASS 1 OBESITY DUE TO EXCESS CALORIES WITH SERIOUS COMORBIDITY AND BODY MASS INDEX (BMI) OF 34.0 TO 34.9 IN ADULT: ICD-10-CM

## 2022-11-09 PROCEDURE — G8427 DOCREV CUR MEDS BY ELIG CLIN: HCPCS | Performed by: INTERNAL MEDICINE

## 2022-11-09 PROCEDURE — 3017F COLORECTAL CA SCREEN DOC REV: CPT | Performed by: INTERNAL MEDICINE

## 2022-11-09 PROCEDURE — 1036F TOBACCO NON-USER: CPT | Performed by: INTERNAL MEDICINE

## 2022-11-09 PROCEDURE — 1123F ACP DISCUSS/DSCN MKR DOCD: CPT | Performed by: INTERNAL MEDICINE

## 2022-11-09 PROCEDURE — G8417 CALC BMI ABV UP PARAM F/U: HCPCS | Performed by: INTERNAL MEDICINE

## 2022-11-09 PROCEDURE — G8484 FLU IMMUNIZE NO ADMIN: HCPCS | Performed by: INTERNAL MEDICINE

## 2022-11-09 PROCEDURE — 3078F DIAST BP <80 MM HG: CPT | Performed by: INTERNAL MEDICINE

## 2022-11-09 PROCEDURE — 99214 OFFICE O/P EST MOD 30 MIN: CPT | Performed by: INTERNAL MEDICINE

## 2022-11-09 PROCEDURE — 3074F SYST BP LT 130 MM HG: CPT | Performed by: INTERNAL MEDICINE

## 2022-11-09 RX ORDER — ERGOCALCIFEROL (VITAMIN D2) 1250 MCG
CAPSULE ORAL
Qty: 12 CAPSULE | Refills: 2 | Status: CANCELLED | OUTPATIENT
Start: 2022-11-09

## 2022-11-09 RX ORDER — ATORVASTATIN CALCIUM 20 MG/1
TABLET, FILM COATED ORAL
Qty: 90 TABLET | Refills: 3 | Status: ON HOLD | OUTPATIENT
Start: 2022-11-09 | End: 2022-11-21 | Stop reason: HOSPADM

## 2022-11-09 RX ORDER — VALSARTAN 160 MG/1
160 TABLET ORAL DAILY
Qty: 90 TABLET | Refills: 3 | Status: ON HOLD | OUTPATIENT
Start: 2022-11-09 | End: 2022-11-21 | Stop reason: HOSPADM

## 2022-11-09 ASSESSMENT — PATIENT HEALTH QUESTIONNAIRE - PHQ9
SUM OF ALL RESPONSES TO PHQ QUESTIONS 1-9: 0
SUM OF ALL RESPONSES TO PHQ QUESTIONS 1-9: 0
2. FEELING DOWN, DEPRESSED OR HOPELESS: 0
SUM OF ALL RESPONSES TO PHQ QUESTIONS 1-9: 0
1. LITTLE INTEREST OR PLEASURE IN DOING THINGS: 0
SUM OF ALL RESPONSES TO PHQ9 QUESTIONS 1 & 2: 0
SUM OF ALL RESPONSES TO PHQ QUESTIONS 1-9: 0

## 2022-11-09 ASSESSMENT — ENCOUNTER SYMPTOMS
SHORTNESS OF BREATH: 0
COUGH: 0
ABDOMINAL PAIN: 0
RHINORRHEA: 0
BLOOD IN STOOL: 0

## 2022-11-09 NOTE — PROGRESS NOTES
[facility] by [facility]      2022    [facility]: [facility]  :  1955    Subjective:    Chief Complaint:  Chief Complaint   Patient presents with    Hypertension     Pt is here to review labs. HPI Here for f/u visit; patient had fasting labs done recently and results were reviewed in detail today; He is s/p L direct inguinal hernia repair due to incarcerated hernia 22, per Dr. Cabrera Mancini; he was seen for f/u 10/6/22; he is healing well, is to f/u prn; records reviewed; he is feeling well, has occasional soreness; he denies fever, chills, drainage; he is avoiding heavy lifting; He has interstitial lung disease, bronchiectasis, saw Cecilia WANG in August; he is to continue Ofev, Breztri bid, f/u in 3 months; records reviewed; He has cardiomyopathy, CHF, severe AR, saw Dr. Chantel Diallo in August; he was prescribed Farxiga 10 mg qd; Cardiac MRI considered, is to f/u in 3 months; records reviewed; he tried to have MRI done, but \"machine broke\"; he was in MRI for about 30 minutes, but study not completed and he needs to reschedule;   HTN:  Patient compliant with taking blood pressure medications: Yes  Discussed importance of following low sodium DASH diet, increasing physical activity, taking medications as ordered, decreasing alcohol intake, keeping f/u visits to recheck blood pressure, monitoring blood pressure at home and keeping a log, with goal blood pressure <140/90. Home bp readings are: good      Past Medical History:   Diagnosis Date    Aortic regurgitation     mod-severe eccentric-  ECHO 22; per MD note 22 not controlled    Bronchiectasis ([address]     Cardiomyopathy ([address]     CHF (congestive heart failure) ([address]     ED (erectile dysfunction)     Edema     H/O coronary angiogram 2022    cath report: mild LM disease, severe MV, severe LV dysfunction, increased LVEDP.  MD note 22: Carnella Saliva RCA ~90%, other anatomy with mild non-obstructive disease\" History of transesophageal echocardiography (CARY) 2022    EF 15-20%, left ventrical severely dilated, AV mod-severe eccentric regurgitation, MVR mild to moderate, RVSP 41 mmHg    Hyperlipidemia     Hypertension     medication    ILD (interstitial lung disease) ([address]     Ofev    Murmur     cardiac note 22: No definite PND/ orthopnea. Improved sx since initial eval on 22, down 15-20 pounds, states improved ROOT, persistent lower ext edema, states can walk over a flight of stairs. No chest discomfort. Denies dizziness. Osteoarthritis of knees, bilateral     Vitamin D deficiency        Past Surgical History:   Procedure Laterality Date    COLONOSCOPY  2019    Dr. Cristofer Palmer, due for repeat in 5 years    HERNIA REPAIR Left 2022    INCARCERATED LEFT INGUINAL HERNIA REPAIR W/ MESH performed by Destini Morris MD at [facility] MA[facility] OR    ORTHOPEDIC SURGERY Left     left wrist, cyst       Family History   Problem Relation Age of Onset    Breast Cancer Sister     Heart Disease Father     Coronary Art Dis Neg Hx        Social History     Tobacco Use    Smoking status: Former     Packs/day: 1.00     Years: 20.00     Pack years: 20.00     Types: Cigarettes     Quit date: 2014     Years since quittin.4    Smokeless tobacco: Never   Substance Use Topics    Alcohol use: Yes     Comment: rare    Drug use:  No                 Current Outpatient Medications:     valsartan (DIOVAN) 160 MG tablet, Take 1 tablet by mouth daily, Disp: 90 tablet, Rfl: 3    atorvastatin (LIPITOR) 20 MG tablet, TAKE 1 TABLET BY MOUTH EVERY DAY, Disp: 90 tablet, Rfl: 3    Budeson-Glycopyrrol-Formoterol (BREZTRI AEROSPHERE) 160-9-4.8 MCG/ACT AERO, Inhale 2 puffs into the lungs 2 times daily, Disp: 1 each, Rfl: 11    metoprolol succinate (TOPROL XL) 25 MG extended release tablet, Take 1 tablet by mouth daily, Disp: 30 tablet, Rfl: 3    bumetanide (BUMEX) 1 MG tablet, Take 1 mg by mouth 2 times daily, Disp: , Rfl:     Nintedanib Esylate (OFEV) 150 MG CAPS, TAKE 1 CAPSULE BY MOUTH TWICE A DAY 12 HOURS APART WITH FOOD, Disp: , Rfl:     spironolactone (ALDACTONE) 25 MG tablet, Take 25 mg by mouth daily, Disp: , Rfl:     Allergies   Allergen Reactions    Penicillin G Shortness Of Breath       Review of Systems   Constitutional:  Negative for chills, fatigue and fever. HENT:  Negative for congestion, postnasal drip and rhinorrhea. Eyes:  Negative for visual disturbance. Respiratory:  Negative for cough and shortness of breath. Cardiovascular:  Positive for leg swelling. Negative for chest pain and palpitations. Gastrointestinal:  Negative for abdominal pain and blood in stool. Genitourinary:  Negative for dysuria and frequency. Musculoskeletal:  Negative for arthralgias and myalgias. Skin: Negative. Neurological:  Negative for numbness and headaches. Psychiatric/Behavioral:  Negative for dysphoric mood and sleep disturbance. The patient is not nervous/anxious. All other systems reviewed and are negative. Objective:  /68   Pulse 89   Temp 98.3 °F (36.8 °C) (Temporal)   Resp 16   Ht 5' 8\" (1.727 m)   Wt 228 lb 9.6 oz (103.7 kg)   SpO2 100%   BMI 34.76 kg/m²     Examination:  Physical Exam  Vitals reviewed. Constitutional:       Appearance: Normal appearance. HENT:      Head: Normocephalic and atraumatic. Nose: Nose normal.      Mouth/Throat:      Mouth: Mucous membranes are moist.      Pharynx: Oropharynx is clear. Eyes:      Extraocular Movements: Extraocular movements intact. Conjunctiva/sclera: Conjunctivae normal.      Pupils: Pupils are equal, round, and reactive to light. Cardiovascular:      Rate and Rhythm: Normal rate and regular rhythm. Pulses: Normal pulses. Heart sounds: Normal heart sounds. Pulmonary:      Effort: Pulmonary effort is normal.      Breath sounds: Normal breath sounds. Abdominal:      General: Abdomen is flat.  Bowel sounds are normal.      Palpations: Abdomen is soft. Musculoskeletal:         General: Normal range of motion. Cervical back: Normal range of motion and neck supple. Right lower leg: Edema present. Left lower leg: Edema present. Skin:     General: Skin is warm and dry. Neurological:      General: No focal deficit present. Mental Status: He is alert and oriented to person, place, and time. Mental status is at baseline. Psychiatric:         Mood and Affect: Mood normal.         Behavior: Behavior normal.         Assessment/Plan:    Zoran Arellano was seen today for hypertension. Diagnoses and all orders for this visit:    Benign essential hypertension  Stable, continue medication, diet. -     valsartan (DIOVAN) 160 MG tablet; Take 1 tablet by mouth daily    Congestive heart failure, unspecified HF chronicity, unspecified heart failure type ([address]  Stable, continue medication, diet. Recommended wearing compression stockings, keeping legs elevated when seated or lying down;      Dilated cardiomyopathy (HCC)  Stable, continue medication, diet. ILD (interstitial lung disease) ([address]  Stable on present medications, continue as prescribed. Bronchiectasis without complication ([address]  Stable on present medications, continue as prescribed. Vitamin D deficiency  He is to continue otc multivitamin with Vitamin D;     Mixed hyperlipidemia  Stable, continue medication, diet. -     atorvastatin (LIPITOR) 20 MG tablet; TAKE 1 TABLET BY MOUTH EVERY DAY    Class 1 obesity due to excess calories with serious comorbidity and body mass index (BMI) of 34.0 to 34.9 in adult  Recommended low fat, low cholesterol, low carbohydrate diet. Recommended increasing fresh fruits and vegetables in diet, eating lean meats, like fish and poultry, that are baked, broiled or grilled, not fried. Recommended regular exercise, 30 minutes of aerobic activity 4-5 days a week.   Recommended monitoring weight closely and keeping follow-up appointments for recheck. Hyperthyroidism  -     US THYROID; Future  -     Thyroid Antibodies; Future        Follow-up and Dispositions    Return in about 6 months (around 5/9/2023), or if symptoms worsen or fail to improve, for f/u w/ labs. Medication Reconciliation:  Current Medications Verified: Current medications/ immunizations were reviewed, including purpose, with patient. Family History, Social History, Current and Past Medical History was reviewed with patient and updated at today's office visit. Medication Reconciliation list was given to patient/ family. Patient was advised to discard old medication lists and provide all providers with current list at each visit and carry list with them in case of emergency.     Completed By:   Uzma Osborne MD    [facility]  [address], [address], [address]  [phone]  [phone] fax  11:19 AM

## 2022-11-12 LAB
THYROGLOB AB SERPL-ACNC: <1 IU/ML (ref 0–0.9)
THYROPEROXIDASE AB SERPL-ACNC: <8 IU/ML (ref 0–34)

## 2022-11-19 ENCOUNTER — APPOINTMENT (OUTPATIENT)
Dept: MRI IMAGING | Age: 67
DRG: 069 | End: 2022-11-19
Payer: COMMERCIAL

## 2022-11-19 ENCOUNTER — HOSPITAL ENCOUNTER (EMERGENCY)
Dept: CT IMAGING | Age: 67
Discharge: HOME | DRG: 069 | End: 2022-11-22
Payer: COMMERCIAL

## 2022-11-19 ENCOUNTER — APPOINTMENT (OUTPATIENT)
Dept: CT IMAGING | Age: 67
DRG: 069 | End: 2022-11-19
Payer: COMMERCIAL

## 2022-11-19 ENCOUNTER — HOSPITAL ENCOUNTER (INPATIENT)
Age: 67
LOS: 2 days | Discharge: HOME | DRG: 069 | End: 2022-11-21
Attending: EMERGENCY MEDICINE | Admitting: STUDENT IN AN ORGANIZED HEALTH CARE EDUCATION/TRAINING PROGRAM
Payer: COMMERCIAL

## 2022-11-19 DIAGNOSIS — G45.9 TIA (TRANSIENT ISCHEMIC ATTACK): ICD-10-CM

## 2022-11-19 DIAGNOSIS — R29.898 RIGHT HAND WEAKNESS: Primary | ICD-10-CM

## 2022-11-19 LAB
ALBUMIN SERPL-MCNC: 3.5 G/DL (ref 3.2–4.6)
ALBUMIN/GLOB SERPL: 0.9 {RATIO} (ref 0.4–1.6)
ALP SERPL-CCNC: 120 U/L (ref 50–136)
ALT SERPL-CCNC: 30 U/L (ref 12–65)
ANION GAP SERPL CALC-SCNC: 7 MMOL/L (ref 2–11)
AST SERPL-CCNC: 24 U/L (ref 15–37)
BILIRUB SERPL-MCNC: 2.6 MG/DL (ref 0.2–1.1)
BUN SERPL-MCNC: 20 MG/DL (ref 8–23)
CALCIUM SERPL-MCNC: 10.1 MG/DL (ref 8.3–10.4)
CHLORIDE SERPL-SCNC: 105 MMOL/L (ref 101–110)
CO2 SERPL-SCNC: 27 MMOL/L (ref 21–32)
CREAT SERPL-MCNC: 1.66 MG/DL (ref 0.8–1.5)
ERYTHROCYTE [DISTWIDTH] IN BLOOD BY AUTOMATED COUNT: 14.6 % (ref 11.9–14.6)
GLOBULIN SER CALC-MCNC: 4 G/DL (ref 2.8–4.5)
GLUCOSE SERPL-MCNC: 103 MG/DL (ref 65–100)
HCT VFR BLD AUTO: 48.2 % (ref 41.1–50.3)
HGB BLD-MCNC: 15.5 G/DL (ref 13.6–17.2)
INR PPP: 1.4
MAGNESIUM SERPL-MCNC: 1.6 MG/DL (ref 1.8–2.4)
MAGNESIUM SERPL-MCNC: 1.9 MG/DL (ref 1.8–2.4)
MCH RBC QN AUTO: 33 PG (ref 26.1–32.9)
MCHC RBC AUTO-ENTMCNC: 32.2 G/DL (ref 31.4–35)
MCV RBC AUTO: 102.6 FL (ref 82–102)
NRBC # BLD: 0 K/UL (ref 0–0.2)
PLATELET # BLD AUTO: 187 K/UL (ref 150–450)
PMV BLD AUTO: 9.2 FL (ref 9.4–12.3)
POTASSIUM SERPL-SCNC: 3.7 MMOL/L (ref 3.5–5.1)
POTASSIUM SERPL-SCNC: 3.7 MMOL/L (ref 3.5–5.1)
PROT SERPL-MCNC: 7.5 G/DL (ref 6.3–8.2)
PROTHROMBIN TIME: 17.2 SEC (ref 12.6–14.3)
RBC # BLD AUTO: 4.7 M/UL (ref 4.23–5.6)
SODIUM SERPL-SCNC: 139 MMOL/L (ref 133–143)
WBC # BLD AUTO: 6.6 K/UL (ref 4.3–11.1)

## 2022-11-19 PROCEDURE — 83735 ASSAY OF MAGNESIUM: CPT

## 2022-11-19 PROCEDURE — 85610 PROTHROMBIN TIME: CPT

## 2022-11-19 PROCEDURE — 4A03X5D MEASUREMENT OF ARTERIAL FLOW, INTRACRANIAL, EXTERNAL APPROACH: ICD-10-PCS | Performed by: INTERNAL MEDICINE

## 2022-11-19 PROCEDURE — 6370000000 HC RX 637 (ALT 250 FOR IP): Performed by: EMERGENCY MEDICINE

## 2022-11-19 PROCEDURE — 70450 CT HEAD/BRAIN W/O DYE: CPT

## 2022-11-19 PROCEDURE — 85027 COMPLETE CBC AUTOMATED: CPT

## 2022-11-19 PROCEDURE — 82962 GLUCOSE BLOOD TEST: CPT

## 2022-11-19 PROCEDURE — 80053 COMPREHEN METABOLIC PANEL: CPT

## 2022-11-19 PROCEDURE — 6360000002 HC RX W HCPCS: Performed by: STUDENT IN AN ORGANIZED HEALTH CARE EDUCATION/TRAINING PROGRAM

## 2022-11-19 PROCEDURE — 84132 ASSAY OF SERUM POTASSIUM: CPT

## 2022-11-19 PROCEDURE — 6370000000 HC RX 637 (ALT 250 FOR IP): Performed by: STUDENT IN AN ORGANIZED HEALTH CARE EDUCATION/TRAINING PROGRAM

## 2022-11-19 PROCEDURE — 99222 1ST HOSP IP/OBS MODERATE 55: CPT | Performed by: PSYCHIATRY & NEUROLOGY

## 2022-11-19 PROCEDURE — 70551 MRI BRAIN STEM W/O DYE: CPT

## 2022-11-19 PROCEDURE — 6360000004 HC RX CONTRAST MEDICATION: Performed by: EMERGENCY MEDICINE

## 2022-11-19 PROCEDURE — 70498 CT ANGIOGRAPHY NECK: CPT

## 2022-11-19 PROCEDURE — 99285 EMERGENCY DEPT VISIT HI MDM: CPT

## 2022-11-19 PROCEDURE — 1100000000 HC RM PRIVATE

## 2022-11-19 PROCEDURE — 2580000003 HC RX 258: Performed by: EMERGENCY MEDICINE

## 2022-11-19 PROCEDURE — 36415 COLL VENOUS BLD VENIPUNCTURE: CPT

## 2022-11-19 RX ORDER — ASPIRIN 81 MG/1
324 TABLET, CHEWABLE ORAL ONCE
Status: COMPLETED | OUTPATIENT
Start: 2022-11-19 | End: 2022-11-19

## 2022-11-19 RX ORDER — ATORVASTATIN CALCIUM 10 MG/1
20 TABLET, FILM COATED ORAL NIGHTLY
Status: DISCONTINUED | OUTPATIENT
Start: 2022-11-19 | End: 2022-11-20

## 2022-11-19 RX ORDER — CLOPIDOGREL BISULFATE 75 MG/1
75 TABLET ORAL DAILY
Status: DISCONTINUED | OUTPATIENT
Start: 2022-11-19 | End: 2022-11-21 | Stop reason: HOSPADM

## 2022-11-19 RX ORDER — ONDANSETRON 2 MG/ML
4 INJECTION INTRAMUSCULAR; INTRAVENOUS EVERY 6 HOURS PRN
Status: DISCONTINUED | OUTPATIENT
Start: 2022-11-19 | End: 2022-11-21 | Stop reason: HOSPADM

## 2022-11-19 RX ORDER — ASPIRIN 81 MG/1
81 TABLET ORAL DAILY
Status: DISCONTINUED | OUTPATIENT
Start: 2022-11-19 | End: 2022-11-21 | Stop reason: HOSPADM

## 2022-11-19 RX ORDER — ONDANSETRON 4 MG/1
4 TABLET, ORALLY DISINTEGRATING ORAL EVERY 8 HOURS PRN
Status: DISCONTINUED | OUTPATIENT
Start: 2022-11-19 | End: 2022-11-21 | Stop reason: HOSPADM

## 2022-11-19 RX ORDER — VALSARTAN 80 MG/1
160 TABLET ORAL DAILY
Status: DISCONTINUED | OUTPATIENT
Start: 2022-11-19 | End: 2022-11-21 | Stop reason: HOSPADM

## 2022-11-19 RX ORDER — ATORVASTATIN CALCIUM 40 MG/1
80 TABLET, FILM COATED ORAL NIGHTLY
Status: DISCONTINUED | OUTPATIENT
Start: 2022-11-19 | End: 2022-11-21 | Stop reason: HOSPADM

## 2022-11-19 RX ORDER — BUMETANIDE 1 MG/1
1 TABLET ORAL 2 TIMES DAILY
Status: DISCONTINUED | OUTPATIENT
Start: 2022-11-19 | End: 2022-11-21 | Stop reason: HOSPADM

## 2022-11-19 RX ORDER — SODIUM CHLORIDE 0.9 % (FLUSH) 0.9 %
10 SYRINGE (ML) INJECTION
Status: COMPLETED | OUTPATIENT
Start: 2022-11-19 | End: 2022-11-19

## 2022-11-19 RX ORDER — ENOXAPARIN SODIUM 100 MG/ML
30 INJECTION SUBCUTANEOUS EVERY 12 HOURS
Status: DISCONTINUED | OUTPATIENT
Start: 2022-11-19 | End: 2022-11-21 | Stop reason: HOSPADM

## 2022-11-19 RX ORDER — POLYETHYLENE GLYCOL 3350 17 G/17G
17 POWDER, FOR SOLUTION ORAL DAILY PRN
Status: DISCONTINUED | OUTPATIENT
Start: 2022-11-19 | End: 2022-11-21 | Stop reason: HOSPADM

## 2022-11-19 RX ORDER — 0.9 % SODIUM CHLORIDE 0.9 %
100 INTRAVENOUS SOLUTION INTRAVENOUS ONCE
Status: COMPLETED | OUTPATIENT
Start: 2022-11-19 | End: 2022-11-19

## 2022-11-19 RX ORDER — METOPROLOL SUCCINATE 25 MG/1
25 TABLET, EXTENDED RELEASE ORAL DAILY
Status: DISCONTINUED | OUTPATIENT
Start: 2022-11-19 | End: 2022-11-21 | Stop reason: HOSPADM

## 2022-11-19 RX ORDER — SPIRONOLACTONE 25 MG/1
25 TABLET ORAL DAILY
Status: DISCONTINUED | OUTPATIENT
Start: 2022-11-19 | End: 2022-11-21 | Stop reason: HOSPADM

## 2022-11-19 RX ADMIN — ASPIRIN 324 MG: 81 TABLET, CHEWABLE ORAL at 11:24

## 2022-11-19 RX ADMIN — ENOXAPARIN SODIUM 30 MG: 100 INJECTION SUBCUTANEOUS at 21:22

## 2022-11-19 RX ADMIN — CLOPIDOGREL BISULFATE 75 MG: 75 TABLET ORAL at 16:02

## 2022-11-19 RX ADMIN — SODIUM CHLORIDE 100 ML: 9 INJECTION, SOLUTION INTRAVENOUS at 10:44

## 2022-11-19 RX ADMIN — ATORVASTATIN CALCIUM 80 MG: 40 TABLET, FILM COATED ORAL at 21:56

## 2022-11-19 RX ADMIN — ASPIRIN 81 MG: 81 TABLET, COATED ORAL at 16:02

## 2022-11-19 RX ADMIN — SODIUM CHLORIDE, PRESERVATIVE FREE 10 ML: 5 INJECTION INTRAVENOUS at 10:44

## 2022-11-19 RX ADMIN — IOPAMIDOL 50 ML: 755 INJECTION, SOLUTION INTRAVENOUS at 10:44

## 2022-11-19 ASSESSMENT — ENCOUNTER SYMPTOMS
SORE THROAT: 0
SHORTNESS OF BREATH: 0
EYE REDNESS: 0
ABDOMINAL PAIN: 0
WHEEZING: 0
NAUSEA: 0
COUGH: 0
VOMITING: 0
DIARRHEA: 0
COLOR CHANGE: 0

## 2022-11-19 ASSESSMENT — PAIN - FUNCTIONAL ASSESSMENT: PAIN_FUNCTIONAL_ASSESSMENT: NONE - DENIES PAIN

## 2022-11-19 NOTE — ED PROVIDER NOTES
Emergency Department Provider Note                   PCP:                Alf Moreno MD               Age: 73 y.o. Sex: male       ICD-10-CM    1. Right hand weakness  R29.898           DISPOSITION Decision To Admit 11/19/2022 11:06:38 AM        MDM  Number of Diagnoses or Management Options  Right hand weakness  Diagnosis management comments: He is outside the tPA window but a code stroke was called for further evaluation.    ===================================================================  This patient is critically ill and there is a high probability of of imminent or life threatening deterioration in the patient's condition without immediate management. The nature of the patient's clinical problem is: TIA with right hand weakness    I have spent 30 minutes in direct patient care, documentation, review of labs/xrays/old records, discussion with hospitalist, neurologist, nursing. The time involved in the performance of separately reportable procedures was not counted toward critical care time. Kee Valladares MD; 11/19/2022 @11:07 AM  ===================================================================                 ED Course as of 11/19/22 1107   Sat Nov 19, 2022   1036 I spoke with on-call neurology who recommends a CTA and aspirin.   We will plan to admit for further care. [AC]      ED Course User Index  [AC] Kee Valladares MD        Orders Placed This Encounter   Procedures    CT HEAD WO CONTRAST    CTA HEAD NECK W WO CONTRAST    CBC    Magnesium    Comprehensive Metabolic Panel    Protime-INR    POCT Glucose    EKG 12 Lead    Saline lock IV        Medications   aspirin chewable tablet 324 mg (has no administration in time range)       New Prescriptions    No medications on file        Gabino Villarreal is a 73 y.o. male who presents to the Emergency Department with chief complaint of    Chief Complaint   Patient presents with    Hand Numbness      80-year-old gentleman presents with concerns about right hand and arm weakness that started around 430 this morning. He said he was trying to make coffee and he was struggling to be able to pour the coffee pot despite being right-handed. He said the symptoms lasted for about an hour and then improved although they never fully went away. He does have a history of coronary artery disease as well as congestive heart failure and a cardiomyopathy. He denies any prior history of strokes or TIA. He has no speech or language problems and no balance or coordination issues. Elements of this note were created using speech recognition software. As such, errors of speech recognition may be present. Review of Systems   Constitutional:  Negative for activity change, chills and fever. HENT:  Negative for congestion and sore throat. Eyes:  Negative for redness and visual disturbance. Respiratory:  Negative for cough, shortness of breath and wheezing. Cardiovascular:  Negative for chest pain and palpitations. Gastrointestinal:  Negative for abdominal pain, diarrhea, nausea and vomiting. Endocrine: Negative for polydipsia and polyuria. Genitourinary:  Negative for flank pain and hematuria. Musculoskeletal:  Negative for joint swelling and myalgias. Skin:  Negative for color change and rash. Allergic/Immunologic: Negative for immunocompromised state. Neurological:  Positive for weakness. Negative for dizziness, light-headedness and headaches. Hematological:  Negative for adenopathy. Psychiatric/Behavioral:  Negative for confusion. Past Medical History:   Diagnosis Date    Aortic regurgitation     mod-severe eccentric-  ECHO 5/5/22; per MD note 8/8/22 not controlled    Bronchiectasis ([address]     Cardiomyopathy ([address]     CHF (congestive heart failure) ([address]     ED (erectile dysfunction)     Edema     H/O coronary angiogram 05/05/2022    cath report: mild LM disease, severe MV, severe LV dysfunction, increased LVEDP. MD note 22: Oretha Schirmer RCA ~90%, other anatomy with mild non-obstructive disease\"    History of transesophageal echocardiography (CARY) 2022    EF 15-20%, left ventrical severely dilated, AV mod-severe eccentric regurgitation, MVR mild to moderate, RVSP 41 mmHg    Hyperlipidemia     Hypertension     medication    ILD (interstitial lung disease) ([address]     Ofev    Murmur     cardiac note 22: No definite PND/ orthopnea. Improved sx since initial eval on 22, down 15-20 pounds, states improved ROOT, persistent lower ext edema, states can walk over a flight of stairs. No chest discomfort. Denies dizziness.     Osteoarthritis of knees, bilateral     Vitamin D deficiency         Past Surgical History:   Procedure Laterality Date    COLONOSCOPY  2019    Dr. Nomr Arana, due for repeat in 5 years    HERNIA REPAIR Left 2022    INCARCERATED LEFT INGUINAL HERNIA REPAIR W/ MESH performed by Nathanael Moore MD at [facility] MAIN OR    ORTHOPEDIC SURGERY Left     left wrist, cyst        Family History   Problem Relation Age of Onset    Breast Cancer Sister     Heart Disease Father     Coronary Art Dis Neg Hx         Social History     Socioeconomic History    Marital status: Legally    Tobacco Use    Smoking status: Former     Packs/day: 1.00     Years: 20.00     Pack years: 20.00     Types: Cigarettes     Quit date: 2014     Years since quittin.5    Smokeless tobacco: Never   Substance and Sexual Activity    Alcohol use: Yes     Comment: rare    Drug use: No         Penicillin g     Previous Medications    ATORVASTATIN (LIPITOR) 20 MG TABLET    TAKE 1 TABLET BY MOUTH EVERY DAY    BUDESON-GLYCOPYRROL-FORMOTEROL (BREZTRI AEROSPHERE) 160-9-4.8 MCG/ACT AERO    Inhale 2 puffs into the lungs 2 times daily    BUMETANIDE (BUMEX) 1 MG TABLET    Take 1 mg by mouth 2 times daily    METOPROLOL SUCCINATE (TOPROL XL) 25 MG EXTENDED RELEASE TABLET    Take 1 tablet by mouth daily    NINTEDANIB ESYLATE (OFEV) 150 MG CAPS    TAKE 1 CAPSULE BY MOUTH TWICE A DAY 12 HOURS APART WITH FOOD    SPIRONOLACTONE (ALDACTONE) 25 MG TABLET    Take 25 mg by mouth daily    VALSARTAN (DIOVAN) 160 MG TABLET    Take 1 tablet by mouth daily        Vitals signs and nursing note reviewed. Patient Vitals for the past 4 hrs:   Temp Pulse Resp BP SpO2   11/19/22 1008 97.7 °F (36.5 °C) -- -- -- --   11/19/22 0915 -- (!) 104 16 118/70 98 %          Physical Exam  Vitals and nursing note reviewed. Constitutional:       Appearance: Normal appearance. HENT:      Head: Normocephalic and atraumatic. Mouth/Throat:      Mouth: Mucous membranes are moist.   Eyes:      General:         Right eye: No discharge. Left eye: No discharge. Cardiovascular:      Rate and Rhythm: Normal rate and regular rhythm. Pulses: Normal pulses. Pulmonary:      Effort: Pulmonary effort is normal.      Breath sounds: Normal breath sounds. No wheezing. Abdominal:      General: Bowel sounds are normal.      Tenderness: There is no abdominal tenderness. There is no guarding or rebound. Musculoskeletal:      Right lower leg: No edema. Left lower leg: No edema. Lymphadenopathy:      Cervical: No cervical adenopathy. Skin:     Coloration: Skin is not jaundiced. Neurological:      General: No focal deficit present. Mental Status: He is alert and oriented to person, place, and time. Mental status is at baseline. Comments: Patient has decreased  strength with the right hand and decreased bicep strength. It is 4 out of 5 on the right and 5 out of 5 on the left.             Procedures    Results for orders placed or performed during the hospital encounter of 11/19/22   CT HEAD WO CONTRAST    Narrative    CT head without contrast    History: right arm weakness    Technique: 5mm axial images were obtained from the skull base to the vertex  without contrast. Radiation dose reduction techniques were used for this study:   Our CT scanners use one or all of the following: Automated exposure control,  adjustment of the mA and/or kVp according to patient's size, iterative  reconstruction. Coronal and sagittal reformatted images were submitted. Comparison: None    Findings: The ventricles and sulci are appropriate for age. There are no  extra-axial fluid collections. No evidence of acute intraparenchymal hemorrhage  or mass effect is identified. Patchy areas of decreased attenuation are noted  within the supratentorial white matter. These are nonspecific findings but would  be most compatible with mild chronic small vessel ischemic changes. There is no  evidence to suggest an acute major territorial infarct. The bony calvarium is intact. The visualized mastoid air cells and paranasal  sinuses are well pneumatized and aerated. Impression    1. Findings most compatible with mild chronic small vessel ischemic changes. 2. Otherwise unremarkable unenhanced CT scan of the brain.          CBC   Result Value Ref Range    WBC 6.6 4.3 - 11.1 K/uL    RBC 4.70 4.23 - 5.6 M/uL    Hemoglobin 15.5 13.6 - 17.2 g/dL    Hematocrit 48.2 41.1 - 50.3 %    .6 (H) 82.0 - 102.0 FL    MCH 33.0 (H) 26.1 - 32.9 PG    MCHC 32.2 31.4 - 35.0 g/dL    RDW 14.6 11.9 - 14.6 %    Platelets 473 221 - 100 K/uL    MPV 9.2 (L) 9.4 - 12.3 FL    nRBC 0.00 0.0 - 0.2 K/uL   Magnesium   Result Value Ref Range    Magnesium 1.9 1.8 - 2.4 mg/dL   Comprehensive Metabolic Panel   Result Value Ref Range    Sodium 139 133 - 143 mmol/L    Potassium 3.7 3.5 - 5.1 mmol/L    Chloride 105 101 - 110 mmol/L    CO2 27 21 - 32 mmol/L    Anion Gap 7 2 - 11 mmol/L    Glucose 103 (H) 65 - 100 mg/dL    BUN 20 8 - 23 MG/DL    Creatinine 1.66 (H) 0.8 - 1.5 MG/DL    Est, Glom Filt Rate 45 (L) >60 ml/min/1.73m2    Calcium 10.1 8.3 - 10.4 MG/DL    Total Bilirubin 2.6 (H) 0.2 - 1.1 MG/DL    ALT 30 12 - 65 U/L    AST 24 15 - 37 U/L    Alk Phosphatase 120 50 - 136 U/L    Total Protein 7.5 6.3 - 8.2 g/dL Albumin 3.5 3.2 - 4.6 g/dL    Globulin 4.0 2.8 - 4.5 g/dL    Albumin/Globulin Ratio 0.9 0.4 - 1.6     Protime-INR   Result Value Ref Range    Protime 17.2 (H) 12.6 - 14.3 sec    INR 1.4          CT HEAD WO CONTRAST   Final Result      1. Findings most compatible with mild chronic small vessel ischemic changes. 2. Otherwise unremarkable unenhanced CT scan of the brain. CTA HEAD NECK W WO CONTRAST    (Results Pending)        NIH Stroke Scale  Interval: Reassessment  Level of Consciousness (1a): Alert  LOC Questions (1b): Answers both correctly  LOC Commands (1c): Performs both tasks correctly  Best Gaze (2): Normal  Visual (3): No visual loss  Facial Palsy (4): Normal symmetrical movement  Motor Arm, Left (5a): No drift  Motor Arm, Right (5b): No drift  Motor Leg, Left (6a): No drift  Motor Leg, Right (6b): No drift  Limb Ataxia (7): Absent  Sensory (8): Normal (Pt reports tingling in Right hand. Pt denies numbness.)  Best Language (9): No aphasia  Dysarthria (10): Normal  Extinction and Inattention (11): No abnormality  Total: 0              Voice dictation software was used during the making of this note. This software is not perfect and grammatical and other typographical errors may be present. This note has not been completely proofread for errors.         Lina Toledo MD  11/19/22 9708

## 2022-11-19 NOTE — CONSULTS
[address] [facility] Neurology [address], [address] [facility]            Pepper Castle is a 73 y.o. male who presents on referral from the emergency department transient onset of right sided arm weakness this morning at about 0400 hrs. which is now resolved patient is known to have a severe cardiomyopathy with an EF of under 20%      Past Medical History:   Diagnosis Date    Aortic regurgitation     mod-severe eccentric-  ECHO 5/5/22; per MD note 8/8/22 not controlled    Bronchiectasis ([address]     Cardiomyopathy ([address]     CHF (congestive heart failure) ([address]     ED (erectile dysfunction)     Edema     H/O coronary angiogram 05/05/2022    cath report: mild LM disease, severe MV, severe LV dysfunction, increased LVEDP. MD note 8/8/22: [place] RCA ~90%, other anatomy with mild non-obstructive disease\"    History of transesophageal echocardiography (CARY) 05/05/2022    EF 15-20%, left ventrical severely dilated, AV mod-severe eccentric regurgitation, MVR mild to moderate, RVSP 41 mmHg    Hyperlipidemia     Hypertension     medication    ILD (interstitial lung disease) ([address]     Ofev    Murmur     cardiac note 8/8/22: No definite PND/ orthopnea. Improved sx since initial eval on 4/12/22, down 15-20 pounds, states improved ROOT, persistent lower ext edema, states can walk over a flight of stairs. No chest discomfort. Denies dizziness.     Osteoarthritis of knees, bilateral     Vitamin D deficiency        Past Surgical History:   Procedure Laterality Date    COLONOSCOPY  07/2019    Dr. Yojana Nelson, due for repeat in 5 years    HERNIA REPAIR Left 9/23/2022    INCARCERATED LEFT INGUINAL HERNIA REPAIR W/ MESH performed by Derek Mckeon MD at [facility] MAIN OR    ORTHOPEDIC SURGERY Left     left wrist, cyst        Family History   Problem Relation Age of Onset    Breast Cancer Sister     Heart Disease Father     Coronary Art Dis Neg Hx         Social History     Socioeconomic History    Marital status: Legally    Tobacco Use    Smoking status: Former     Packs/day: 1.00     Years: 20.00     Pack years: 20.00     Types: Cigarettes     Quit date: 2014     Years since quittin.5    Smokeless tobacco: Never   Substance and Sexual Activity    Alcohol use: Yes     Comment: rare    Drug use: No         No current facility-administered medications for this encounter. Current Outpatient Medications   Medication Sig Dispense Refill    valsartan (DIOVAN) 160 MG tablet Take 1 tablet by mouth daily 90 tablet 3    atorvastatin (LIPITOR) 20 MG tablet TAKE 1 TABLET BY MOUTH EVERY DAY 90 tablet 3    Budeson-Glycopyrrol-Formoterol (BREZTRI AEROSPHERE) 160-9-4.8 MCG/ACT AERO Inhale 2 puffs into the lungs 2 times daily 1 each 11    metoprolol succinate (TOPROL XL) 25 MG extended release tablet Take 1 tablet by mouth daily 30 tablet 3    bumetanide (BUMEX) 1 MG tablet Take 1 mg by mouth 2 times daily      Nintedanib Esylate (OFEV) 150 MG CAPS TAKE 1 CAPSULE BY MOUTH TWICE A DAY 12 HOURS APART WITH FOOD      spironolactone (ALDACTONE) 25 MG tablet Take 25 mg by mouth daily          Allergies   Allergen Reactions    Penicillin G Shortness Of Breath       Review of Systems    /70   Pulse (!) 104   Resp 16   Ht 5' 8\" (1.727 m)   Wt 228 lb (103.4 kg)   SpO2 98%   BMI 34.67 kg/m²     Neurologic Exam  The patient is alert cooperative and oriented. No evident skin lesions no evidence of excessive bruising no trauma  Patient looks well-hydrated. Does not appear chronically ill. Cranial nerve examination normal visual fields. Normal random eye movements. No ptosis.   No lid lag  Face moves strongly symmetrically and equally  Speech is normal  Motor  Upper extremities  Normal bulk strength tone and symmetric arm swing  Lower extremities  Normal bulk strength tone  Fine motor coordination is normal in the hands bilaterally    Peripheral sensation light touch normal  Vital signs are reviewed      Most recent MRI   No results found for this or any previous visit. Most recent MRA   No results found for this or any previous visit. Most recent CTA  Results for orders placed during the hospital encounter of 11/19/22    CT HEAD WO CONTRAST    Narrative  CT head without contrast    History: right arm weakness    Technique: 5mm axial images were obtained from the skull base to the vertex  without contrast. Radiation dose reduction techniques were used for this study:  Our CT scanners use one or all of the following: Automated exposure control,  adjustment of the mA and/or kVp according to patient's size, iterative  reconstruction. Coronal and sagittal reformatted images were submitted. Comparison: None    Findings: The ventricles and sulci are appropriate for age. There are no  extra-axial fluid collections. No evidence of acute intraparenchymal hemorrhage  or mass effect is identified. Patchy areas of decreased attenuation are noted  within the supratentorial white matter. These are nonspecific findings but would  be most compatible with mild chronic small vessel ischemic changes. There is no  evidence to suggest an acute major territorial infarct. The bony calvarium is intact. The visualized mastoid air cells and paranasal  sinuses are well pneumatized and aerated. Impression  1. Findings most compatible with mild chronic small vessel ischemic changes. 2. Otherwise unremarkable unenhanced CT scan of the brain. Most recent Echo  Results for orders placed in visit on 04/06/22    Transthoracic echocardiogram (TTE) complete with contrast, bubble, strain, and 3D PRN    Narrative  This is a summary report. The complete report is available in the patient's medical record. If you cannot access the medical record, please contact the sending organization for a detailed fax or copy. Left Ventricle: Left ventricle is severely dilated. Normal wall thickness. Severe global hypokinesis present.  Severely

## 2022-11-19 NOTE — ED NOTES
Patient advises that he woke up this morning and realized he could not hold coffee cup with right hand around 0430. Patient advises that he then started around 0500 to right hand with tingling only. Patient denies any pain at this time. Patient advises sensation has improved however has not resolved.       Sade Huang, RN  11/19/22 [address] [place] [address], ELISA  11/19/22 [id number]

## 2022-11-19 NOTE — H&P
Hospitalist History and Physical   Admit Date:  2022  9:19 AM   Name:  Tila Barlow   Age:  73 y.o. Sex:  male  :  1955   MRN:  [id number]   Room:  [place] [address]    Presenting Complaint: Hand Numbness     Reason(s) for Admission: TIA (transient ischemic attack) [G45.9]     History of Present Illness:   Pepper Castle is a 73 y.o. male with medical history of AR, cardiomyopathy, CHF, hyperlipidemia, hypertension, ILD, osteoarthritis of knees and past surgical history of hernia repair presented with right-sided weakness of the hand at 4 AM today. He was unable to grab the cup with his right hand and pick it up. He felt there was no  which lasted for 1 hour. It is associate with a tingling and numbness. After 1 hour weakness improved. He lives alone and ambulates independently. This is his first episode never felt like this before. Family history of heart attack of his father at the age of late 62s. He quit smoking in . Patient denies chest pain, shortness of breath, headache, nausea, vomiting, diarrhea, dysphagia, dysphonia, slurring of the speech. Review of Systems:  10 systems reviewed and negative except as noted in HPI. Assessment & Plan:     Principal Problem:    TIA (transient ischemic attack)  NIHSS score is 0  No TPA was given as the patient is out of window  CT head showed no stroke  Neurochecks q4h  Tele monitoring  Follow-up with echo with bubble study  Follow-up with MRI of head and MRA   We will maintain permissive hypertension for 24 hours  Follow-up with ST/PT/OT  Passed bedside speech and swallow  Started  aspirin, Plavix  and high intensity statin  check TSH, A1c, lipid panel, vitamin D, Cardiac enzymes  Maintaining permissive hypertension for now   Neurology is consulted       Anticipated discharge needs: Anticipate hospital stay for 1-2 days.       Diet: No diet orders on file  VTE ppx: Lovenox  Code status: [place] Problems:  Principal Problem:    TIA (transient ischemic attack)  Resolved Problems:    * No resolved hospital problems. *       Past History:     Past Medical History:   Diagnosis Date    Aortic regurgitation     mod-severe eccentric-  ECHO 22; per MD note 22 not controlled    Bronchiectasis ([address]     Cardiomyopathy ([address]     CHF (congestive heart failure) ([address]     ED (erectile dysfunction)     Edema     H/O coronary angiogram 2022    cath report: mild LM disease, severe MV, severe LV dysfunction, increased LVEDP. MD note 22: [place] RCA ~90%, other anatomy with mild non-obstructive disease\"    History of transesophageal echocardiography (CARY) 2022    EF 15-20%, left ventrical severely dilated, AV mod-severe eccentric regurgitation, MVR mild to moderate, RVSP 41 mmHg    Hyperlipidemia     Hypertension     medication    ILD (interstitial lung disease) ([address]     Ofev    Murmur     cardiac note 22: No definite PND/ orthopnea. Improved sx since initial eval on 22, down 15-20 pounds, states improved ROOT, persistent lower ext edema, states can walk over a flight of stairs. No chest discomfort. Denies dizziness.     Osteoarthritis of knees, bilateral     Vitamin D deficiency        Past Surgical History:   Procedure Laterality Date    COLONOSCOPY  2019    Dr. Andrés Franklin, due for repeat in 5 years    HERNIA REPAIR Left 2022    INCARCERATED LEFT INGUINAL HERNIA REPAIR W/ MESH performed by Mahendra Caruso MD at [facility] MAIN OR    ORTHOPEDIC SURGERY Left     left wrist, cyst        Social History     Tobacco Use    Smoking status: Former     Packs/day: 1.00     Years: 20.00     Pack years: 20.00     Types: Cigarettes     Quit date: 2014     Years since quittin.5    Smokeless tobacco: Never   Substance Use Topics    Alcohol use: Yes     Comment: rare      Social History     Substance and Sexual Activity   Drug Use No       Family History   Problem Relation Age of Onset    Breast Cancer Sister     Heart Disease Father     Coronary Art Dis Neg Hx         Immunization History   Administered Date(s) Administered    COVID-19, MODERNA BLUE border, Primary or Immunocompromised, (age 12y+), IM, 100 mcg/0.5mL 07/01/2021, 08/01/2021    COVID-19, MODERNA Booster BLUE border, (age 18y+), IM, 50mcg/0.25mL 01/01/2022    DTaP (Infanrix) 11/09/2009    Influenza Quadv 12/11/2014    Influenza Trivalent 10/10/2011, 11/08/2012    Influenza, FLUARIX, FLULAVAL, Arvis Kyburz (age 10 mo+) AND AFLURIA, (age 1 y+), PF, 0.5mL 11/22/2013, 12/28/2015, 09/28/2018, 10/11/2019, 10/14/2020    Influenza, High Dose (Fluzone 65 yrs and older) 10/01/2021    PPD Test 09/07/2016    Tdap (Boostrix, Adacel) 01/30/2020     Allergies   Allergen Reactions    Penicillin G Shortness Of Breath     Prior to Admit Medications:  Current Outpatient Medications   Medication Instructions    atorvastatin (LIPITOR) 20 MG tablet TAKE 1 TABLET BY MOUTH EVERY DAY    Budeson-Glycopyrrol-Formoterol (BREZTRI AEROSPHERE) 160-9-4.8 MCG/ACT AERO 2 puffs, Inhalation, 2 TIMES DAILY    bumetanide (BUMEX) 1 mg, Oral, 2 TIMES DAILY    metoprolol succinate (TOPROL XL) 25 mg, Oral, DAILY    Nintedanib Esylate (OFEV) 150 MG CAPS TAKE 1 CAPSULE BY MOUTH TWICE A DAY 12 HOURS APART WITH FOOD    spironolactone (ALDACTONE) 25 mg, Oral, DAILY    valsartan (DIOVAN) 160 mg, Oral, DAILY         Objective:   Patient Vitals for the past 24 hrs:   Temp Pulse Resp BP SpO2   11/19/22 1008 97.7 °F (36.5 °C) -- -- -- --   11/19/22 0915 -- (!) 104 16 118/70 98 %       Oxygen Therapy  SpO2: 98 %  O2 Device: None (Room air)    Estimated body mass index is 34.67 kg/m² as calculated from the following:    Height as of this encounter: 5' 8\" (1.727 m). Weight as of this encounter: 228 lb (103.4 kg). No intake or output data in the 24 hours ending 11/19/22 1443      Physical Exam:    Blood pressure 118/70, pulse (!) 104, temperature 97.7 °F (36.5 °C), temperature source Oral, resp.  rate 16, height 5' 8\" (1.727 m), weight 228 lb (103.4 kg), SpO2 98 %. General:    Well nourished. Head:  Normocephalic, atraumatic  Eyes:  Sclerae appear normal.  Pupils equally round. ENT:  Nares appear normal, no drainage. Moist oral mucosa  Neck:  No restricted ROM. Trachea midline   CV:   RRR. No m/r/g. No jugular venous distension. Lungs:   CTAB. No wheezing, rhonchi, or rales. Symmetric expansion. Abdomen: Bowel sounds present. Soft, nontender, nondistended. Extremities: No cyanosis or clubbing. No edema  Skin:     No rashes and normal coloration. Warm and dry. Neuro:  CN II-XII grossly intact. Sensation intact. A&Ox3  Psych:  Normal mood and affect.       I have personally reviewed labs and tests showing:  Recent Labs:  Recent Results (from the past 24 hour(s))   CBC    Collection Time: 11/19/22  9:34 AM   Result Value Ref Range    WBC 6.6 4.3 - 11.1 K/uL    RBC 4.70 4.23 - 5.6 M/uL    Hemoglobin 15.5 13.6 - 17.2 g/dL    Hematocrit 48.2 41.1 - 50.3 %    .6 (H) 82.0 - 102.0 FL    MCH 33.0 (H) 26.1 - 32.9 PG    MCHC 32.2 31.4 - 35.0 g/dL    RDW 14.6 11.9 - 14.6 %    Platelets 416 740 - 028 K/uL    MPV 9.2 (L) 9.4 - 12.3 FL    nRBC 0.00 0.0 - 0.2 K/uL   Magnesium    Collection Time: 11/19/22  9:34 AM   Result Value Ref Range    Magnesium 1.9 1.8 - 2.4 mg/dL   Comprehensive Metabolic Panel    Collection Time: 11/19/22  9:34 AM   Result Value Ref Range    Sodium 139 133 - 143 mmol/L    Potassium 3.7 3.5 - 5.1 mmol/L    Chloride 105 101 - 110 mmol/L    CO2 27 21 - 32 mmol/L    Anion Gap 7 2 - 11 mmol/L    Glucose 103 (H) 65 - 100 mg/dL    BUN 20 8 - 23 MG/DL    Creatinine 1.66 (H) 0.8 - 1.5 MG/DL    Est, Glom Filt Rate 45 (L) >60 ml/min/1.73m2    Calcium 10.1 8.3 - 10.4 MG/DL    Total Bilirubin 2.6 (H) 0.2 - 1.1 MG/DL    ALT 30 12 - 65 U/L    AST 24 15 - 37 U/L    Alk Phosphatase 120 50 - 136 U/L    Total Protein 7.5 6.3 - 8.2 g/dL    Albumin 3.5 3.2 - 4.6 g/dL    Globulin 4.0 2.8 - 4.5 g/dL    Albumin/Globulin Ratio 0.9 0.4 - 1.6     Protime-INR    Collection Time: 11/19/22  9:34 AM   Result Value Ref Range    Protime 17.2 (H) 12.6 - 14.3 sec    INR 1.4         I have personally reviewed imaging studies showing:  CTA HEAD NECK W WO CONTRAST    Result Date: 11/19/2022  History: Right arm weakness since this morning Comments: CT ANGIOGRAM OF THE NECK AND CT ANGIOGRAM OF THE [place] was obtained following the administration of IV contrast. IV contrast was administered to evaluate the arterial vasculature. Reformatted images in the coronal and sagittal planes as well as 3-D imaging was obtained and reviewed on a dedicated PACS and [address]. Radiation reduction dose techniques were used for the study. Our CT scanner use one or all of the following- Automated exposure control, adjustment of the mA and/or KV according the patient size, iterative reconstruction. All measurements are based upon NASCET criteria if appropriate. This study was analyzed by the [address] [place] [address] [address].Algorithm Findings: CT ANGIOGRAM OF THE NECK: The arch and proximal great vessels are patent with diffuse atherosclerotic changes diffuse atherosclerotic changes of the carotid bulbs are noted with approximately 50-55% narrowing of the right carotid bulb and 55-60% narrowing of the proximal right internal carotid artery. The left demonstrates less than 50% narrowing. The proximal vertebral arteries are patent with diffuse atherosclerotic changes noted Incompletely evaluated right and possibly left pleural effusion. Chronic lung changes are present. Probable pulmonary edema. The thyroid gland is grossly unremarkable. CT angiogram of [place]: The petrous, cavernous, and supraclinoid internal carotid arteries demonstrate diffuse atherosclerotic changes however no significant stenosis. There is a prominent infundibulum of the fetal origin of the left posterior cerebral artery. The anterior circulation is patent.  The middle cerebral arteries are patent. The distal vertebral arteries posterior inferior cerebellar arteries, basilar artery and posterior cerebral arteries are patent. The dural venous sinuses are not optimally visualized however grossly appear patent. 1. Diffuse atherosclerotic changes noted within the neck and [place] with more advanced changes noted of the right carotid bulb and proximal right internal carotid artery as described above. 2. Chronic lung changes with incompletely evaluated right pleural effusion and probable pulmonary edema, congestive heart failure likely. CT HEAD WO CONTRAST    Result Date: 11/19/2022  CT head without contrast History: right arm weakness Technique: 5mm axial images were obtained from the skull base to the vertex without contrast. Radiation dose reduction techniques were used for this study: Our CT scanners use one or all of the following: Automated exposure control, adjustment of the mA and/or kVp according to patient's size, iterative reconstruction. Coronal and sagittal reformatted images were submitted. Comparison: None Findings: The ventricles and sulci are appropriate for age. There are no extra-axial fluid collections. No evidence of acute intraparenchymal hemorrhage or mass effect is identified. Patchy areas of decreased attenuation are noted within the supratentorial white matter. These are nonspecific findings but would be most compatible with mild chronic small vessel ischemic changes. There is no evidence to suggest an acute major territorial infarct. The bony calvarium is intact. The visualized mastoid air cells and paranasal sinuses are well pneumatized and aerated. 1. Findings most compatible with mild chronic small vessel ischemic changes. 2. Otherwise unremarkable unenhanced CT scan of the brain.        Echocardiogram:  04/06/22    TRANSTHORACIC ECHOCARDIOGRAM (TTE) COMPLETE (CONTRAST/BUBBLE/3D PRN) 04/11/2022  9:02 AM, 04/11/2022 12:00 AM (Final)    Narrative  This is a summary report. The complete report is available in the patient's medical record. If you cannot access the medical record, please contact the sending organization for a detailed fax or copy. Left Ventricle: Left ventricle is severely dilated. Normal wall thickness. Severe global hypokinesis present. Severely reduced left ventricular systolic function with a visually estimated EF of 15 - 20%. Grade III diastolic dysfunction with increased LAP. Aortic Valve: There is restriction of the left coronary cusp. There is borderline mild aortic stenosis. There is possibly severe aortic regurgitation which is eccentric (difficult to accurately comment on with eccentric nature). Recommend a CARY for further evaluation. Contrast used: Definity. Signed by: Janeal Nyhan, MD on 4/11/2022  9:02 AM, Signed by: Unknown Provider Result on 4/11/2022 12:00 AM        Orders Placed This Encounter   Medications    aspirin chewable tablet 324 mg    OR Linked Order Group     ondansetron (ZOFRAN-ODT) disintegrating tablet 4 mg     ondansetron (ZOFRAN) injection 4 mg    polyethylene glycol (GLYCOLAX) packet 17 g    enoxaparin Sodium (LOVENOX) injection 30 mg     Order Specific Question:   Indication of Use     Answer:   Prophylaxis-DVT/PE    atorvastatin (LIPITOR) tablet 80 mg    clopidogrel (PLAVIX) tablet 75 mg    aspirin EC tablet 81 mg         Signed:  Army Martha MD    Part of this note may have been written by using a voice dictation software. The note has been proof read but may still contain some grammatical/other typographical errors.

## 2022-11-19 NOTE — ED NOTES
Report given to [facility], transfer of care at this time.      Rachel Orona RN  11/19/22 7140

## 2022-11-20 LAB
ANION GAP SERPL CALC-SCNC: 7 MMOL/L (ref 2–11)
BASOPHILS # BLD: 0.1 K/UL (ref 0–0.2)
BASOPHILS NFR BLD: 1 % (ref 0–2)
BUN SERPL-MCNC: 20 MG/DL (ref 8–23)
CALCIUM SERPL-MCNC: 9.2 MG/DL (ref 8.3–10.4)
CHLORIDE SERPL-SCNC: 106 MMOL/L (ref 101–110)
CHOLEST SERPL-MCNC: 113 MG/DL
CO2 SERPL-SCNC: 24 MMOL/L (ref 21–32)
CREAT SERPL-MCNC: 1.36 MG/DL (ref 0.8–1.5)
DIFFERENTIAL METHOD BLD: ABNORMAL
EOSINOPHIL # BLD: 0.1 K/UL (ref 0–0.8)
EOSINOPHIL NFR BLD: 1 % (ref 0.5–7.8)
ERYTHROCYTE [DISTWIDTH] IN BLOOD BY AUTOMATED COUNT: 14.7 % (ref 11.9–14.6)
EST. AVERAGE GLUCOSE BLD GHB EST-MCNC: 123 MG/DL
GLUCOSE SERPL-MCNC: 97 MG/DL (ref 65–100)
HBA1C MFR BLD: 5.9 % (ref 4.8–5.6)
HCT VFR BLD AUTO: 42.3 % (ref 41.1–50.3)
HDLC SERPL-MCNC: 34 MG/DL (ref 40–60)
HDLC SERPL: 3.3 {RATIO}
HGB BLD-MCNC: 14.1 G/DL (ref 13.6–17.2)
IMM GRANULOCYTES # BLD AUTO: 0 K/UL (ref 0–0.5)
IMM GRANULOCYTES NFR BLD AUTO: 0 % (ref 0–5)
LDLC SERPL CALC-MCNC: 67.6 MG/DL
LYMPHOCYTES # BLD: 1.7 K/UL (ref 0.5–4.6)
LYMPHOCYTES NFR BLD: 26 % (ref 13–44)
MCH RBC QN AUTO: 33.4 PG (ref 26.1–32.9)
MCHC RBC AUTO-ENTMCNC: 33.3 G/DL (ref 31.4–35)
MCV RBC AUTO: 100.2 FL (ref 82–102)
MONOCYTES # BLD: 0.6 K/UL (ref 0.1–1.3)
MONOCYTES NFR BLD: 10 % (ref 4–12)
NEUTS SEG # BLD: 4 K/UL (ref 1.7–8.2)
NEUTS SEG NFR BLD: 62 % (ref 43–78)
NRBC # BLD: 0 K/UL (ref 0–0.2)
PHOSPHATE SERPL-MCNC: 2.8 MG/DL (ref 2.3–3.7)
PLATELET # BLD AUTO: 181 K/UL (ref 150–450)
PMV BLD AUTO: 9.2 FL (ref 9.4–12.3)
POTASSIUM SERPL-SCNC: 3.9 MMOL/L (ref 3.5–5.1)
RBC # BLD AUTO: 4.22 M/UL (ref 4.23–5.6)
SODIUM SERPL-SCNC: 137 MMOL/L (ref 133–143)
TRIGL SERPL-MCNC: 57 MG/DL (ref 35–150)
VLDLC SERPL CALC-MCNC: 11.4 MG/DL (ref 6–23)
WBC # BLD AUTO: 6.4 K/UL (ref 4.3–11.1)

## 2022-11-20 PROCEDURE — 6360000002 HC RX W HCPCS: Performed by: STUDENT IN AN ORGANIZED HEALTH CARE EDUCATION/TRAINING PROGRAM

## 2022-11-20 PROCEDURE — 94640 AIRWAY INHALATION TREATMENT: CPT

## 2022-11-20 PROCEDURE — 36415 COLL VENOUS BLD VENIPUNCTURE: CPT

## 2022-11-20 PROCEDURE — 97530 THERAPEUTIC ACTIVITIES: CPT

## 2022-11-20 PROCEDURE — 6370000000 HC RX 637 (ALT 250 FOR IP): Performed by: STUDENT IN AN ORGANIZED HEALTH CARE EDUCATION/TRAINING PROGRAM

## 2022-11-20 PROCEDURE — 97161 PT EVAL LOW COMPLEX 20 MIN: CPT

## 2022-11-20 PROCEDURE — 97165 OT EVAL LOW COMPLEX 30 MIN: CPT

## 2022-11-20 PROCEDURE — 99232 SBSQ HOSP IP/OBS MODERATE 35: CPT | Performed by: PSYCHIATRY & NEUROLOGY

## 2022-11-20 PROCEDURE — 1100000000 HC RM PRIVATE

## 2022-11-20 PROCEDURE — 80048 BASIC METABOLIC PNL TOTAL CA: CPT

## 2022-11-20 PROCEDURE — 80061 LIPID PANEL: CPT

## 2022-11-20 PROCEDURE — 97535 SELF CARE MNGMENT TRAINING: CPT

## 2022-11-20 PROCEDURE — 84100 ASSAY OF PHOSPHORUS: CPT

## 2022-11-20 PROCEDURE — 6360000002 HC RX W HCPCS: Performed by: FAMILY MEDICINE

## 2022-11-20 PROCEDURE — 2700000000 HC OXYGEN THERAPY PER DAY

## 2022-11-20 PROCEDURE — 85025 COMPLETE CBC W/AUTO DIFF WBC: CPT

## 2022-11-20 PROCEDURE — 83036 HEMOGLOBIN GLYCOSYLATED A1C: CPT

## 2022-11-20 PROCEDURE — 94761 N-INVAS EAR/PLS OXIMETRY MLT: CPT

## 2022-11-20 RX ORDER — MAGNESIUM SULFATE IN WATER 40 MG/ML
2000 INJECTION, SOLUTION INTRAVENOUS ONCE
Status: COMPLETED | OUTPATIENT
Start: 2022-11-20 | End: 2022-11-20

## 2022-11-20 RX ORDER — POTASSIUM CHLORIDE 7.45 MG/ML
10 INJECTION INTRAVENOUS
Status: DISPENSED | OUTPATIENT
Start: 2022-11-20 | End: 2022-11-20

## 2022-11-20 RX ADMIN — MAGNESIUM SULFATE HEPTAHYDRATE 2000 MG: 40 INJECTION, SOLUTION INTRAVENOUS at 01:14

## 2022-11-20 RX ADMIN — ENOXAPARIN SODIUM 30 MG: 100 INJECTION SUBCUTANEOUS at 18:16

## 2022-11-20 RX ADMIN — CLOPIDOGREL BISULFATE 75 MG: 75 TABLET ORAL at 09:14

## 2022-11-20 RX ADMIN — MOMETASONE FUROATE AND FORMOTEROL FUMARATE DIHYDRATE 2 PUFF: 200; 5 AEROSOL RESPIRATORY (INHALATION) at 21:20

## 2022-11-20 RX ADMIN — POTASSIUM CHLORIDE 10 MEQ: 7.46 INJECTION, SOLUTION INTRAVENOUS at 04:57

## 2022-11-20 RX ADMIN — ASPIRIN 81 MG: 81 TABLET, COATED ORAL at 09:14

## 2022-11-20 RX ADMIN — ENOXAPARIN SODIUM 30 MG: 100 INJECTION SUBCUTANEOUS at 05:36

## 2022-11-20 RX ADMIN — ATORVASTATIN CALCIUM 80 MG: 40 TABLET, FILM COATED ORAL at 20:34

## 2022-11-20 RX ADMIN — POTASSIUM CHLORIDE 10 MEQ: 7.46 INJECTION, SOLUTION INTRAVENOUS at 03:23

## 2022-11-20 RX ADMIN — POTASSIUM CHLORIDE 10 MEQ: 7.46 INJECTION, SOLUTION INTRAVENOUS at 01:14

## 2022-11-20 RX ADMIN — TIOTROPIUM BROMIDE INHALATION SPRAY 2 PUFF: 3.12 SPRAY, METERED RESPIRATORY (INHALATION) at 08:23

## 2022-11-20 RX ADMIN — MOMETASONE FUROATE AND FORMOTEROL FUMARATE DIHYDRATE 2 PUFF: 200; 5 AEROSOL RESPIRATORY (INHALATION) at 08:23

## 2022-11-20 ASSESSMENT — PAIN SCALES - GENERAL
PAINLEVEL_OUTOF10: 0
PAINLEVEL_OUTOF10: 0

## 2022-11-20 NOTE — PROGRESS NOTES
[facility] Neurology [facility]  [address] [facility], [address] [facility]            Hailey Stone is a 73 y.o. male who presents on referral from the HOSPITAL [place] [address] OF [facility] for follow-up with regards to a transient episode of right arm weakness and clumsiness that lasted a period of 45 minutes yesterday came on half hour after arising from bed and took 40 to 45 minutes to gradually resolve does not describe radial involvement specifically but indicates more fine motor coordination      Past Medical History:   Diagnosis Date    Aortic regurgitation     mod-severe eccentric-  ECHO 5/5/22; per MD note 8/8/22 not controlled    Bronchiectasis ([address]     Cardiomyopathy ([address]     CHF (congestive heart failure) ([address]     ED (erectile dysfunction)     Edema     H/O coronary angiogram 05/05/2022    cath report: mild LM disease, severe MV, severe LV dysfunction, increased LVEDP. MD note 8/8/22: Chan Peak RCA ~90%, other anatomy with mild non-obstructive disease\"    History of transesophageal echocardiography (CARY) 05/05/2022    EF 15-20%, left ventrical severely dilated, AV mod-severe eccentric regurgitation, MVR mild to moderate, RVSP 41 mmHg    Hyperlipidemia     Hypertension     medication    ILD (interstitial lung disease) ([address]     Ofev    Murmur     cardiac note 8/8/22: No definite PND/ orthopnea. Improved sx since initial eval on 4/12/22, down 15-20 pounds, states improved ROOT, persistent lower ext edema, states can walk over a flight of stairs. No chest discomfort. Denies dizziness.     Osteoarthritis of knees, bilateral     Vitamin D deficiency        Past Surgical History:   Procedure Laterality Date    COLONOSCOPY  07/2019    Dr. Hernán Davis, due for repeat in 5 years    HERNIA REPAIR Left 9/23/2022    INCARCERATED LEFT INGUINAL HERNIA REPAIR W/ MESH performed by Jadiel James MD at [facility] MAIN OR    ORTHOPEDIC SURGERY Left     left wrist, cyst        Family History   Problem Relation Age of Onset Breast Cancer Sister     Heart Disease Father     Coronary Art Dis Neg Hx         Social History     Socioeconomic History    Marital status: Legally    Tobacco Use    Smoking status: Former     Packs/day: 1.00     Years: 20.00     Pack years: 20.00     Types: Cigarettes     Quit date: 2014     Years since quittin.5    Smokeless tobacco: Never   Substance and Sexual Activity    Alcohol use: Yes     Comment: rare    Drug use: No         Current Facility-Administered Medications   Medication Dose Route Frequency Provider Last Rate Last Admin    ondansetron (ZOFRAN-ODT) disintegrating tablet 4 mg  4 mg Oral Q8H PRN Mihir Castellanos MD        Or    ondansetron (ZOFRAN) injection 4 mg  4 mg IntraVENous Q6H PRN Mihir Castellanos MD        polyethylene glycol (GLYCOLAX) packet 17 g  17 g Oral Daily PRN Mihir Castellanos MD        enoxaparin Sodium (LOVENOX) injection 30 mg  30 mg SubCUTAneous Q12H Mihir Castellanos MD   30 mg at 22 0536    atorvastatin (LIPITOR) tablet 80 mg  80 mg Oral Nightly Mihir Castellanos MD   80 mg at 22 2156    clopidogrel (PLAVIX) tablet 75 mg  75 mg Oral Daily Mihir Castellanos MD   75 mg at 22 1933    aspirin EC tablet 81 mg  81 mg Oral Daily Mihir Castellanos MD   81 mg at 22 1602    [Held by provider] atorvastatin (LIPITOR) tablet 20 mg  20 mg Oral Nightly Mihir Castellanos MD        bumetanide (BUMEX) tablet 1 mg  1 mg Oral BID Mihir Castellanos MD        [Held by provider] metoprolol succinate (TOPROL XL) extended release tablet 25 mg  25 mg Oral Daily Mihir Castellanos MD        spironolactone (ALDACTONE) tablet 25 mg  25 mg Oral Daily Mihir Castellanos MD        [Held by provider] valsartan (DIOVAN) tablet 160 mg  160 mg Oral Daily Mihir Castellanos MD        Nintedanib Esylate CAPS 150 mg (Patient Supplied)  150 mg Oral BID Mihir Castellanos MD        mometasone-formoterol (DULERA) 200-5 MCG/ACT inhaler 2 puff  2 puff Inhalation BID Mihir Castellanos MD   2 puff at 22 0823    tiotropium (SPIRIVA RESPIMAT) 2.5 MCG/ACT inhaler 2 puff  2 puff Inhalation Daily Manolo Larson MD   2 puff at 11/20/22 5308        Allergies   Allergen Reactions    Penicillin G Shortness Of Breath       Review of Systems  See yesterday's note  BP 92/66   Pulse 87   Temp 97.7 °F (36.5 °C)   Resp 15   Ht 5' 8\" (1.727 m)   Wt 228 lb (103.4 kg)   SpO2 96%   BMI 34.67 kg/m²     Neurologic Exam  The patient is alert cooperative and oriented. Cranial nerve examination normal visual fields. Normal random eye movements. No ptosis. No lid lag  Face moves strongly symmetrically and equally  Speech is normal  Motor  Upper extremities  Normal bulk strength tone and symmetric arm strength without drift  Lower extremities  Normal bulk strength tone no drift    Fine motor coordination is normal in the hands bilaterally    Peripheral sensation light touch normal  There are no carotid bruits  Vital signs are reviewed      Most recent MRI   Results for orders placed during the hospital encounter of 11/19/22    MRI brain without contrast    Narrative  MRI brain without contrast:    History: TIA, right arm weakness. Imaging sequences: Multiplanar multisequence imaging was performed on a 1.5  Leslie magnet. Comparison: None. Correlation is made to the CT scan of the brain 11/19/2022. Findings: The ventricles and sulci are normal in size and configuration. There  are no extra-axial fluid collections. Normal flow voids are present within all  of the major intracranial vessels. No evidence of intraparenchymal hemorrhage  or mass effect is identified. .  There are no areas of restricted diffusion to  suggest an acute or subacute infarction. Patchy and discrete foci of T2 prolongation are present within the  supratentorial white matter. These are nonspecific findings but would be most  compatible with mild chronic small vessel ischemic changes. 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   The mastoid air cells are clear.    Impression  1. No evidence of acute infarction. 2.  Findings most compatible with mild chronic small vessel ischemic change. Most recent MRA   No results found for this or any previous visit. Most recent CTA  Results for orders placed during the hospital encounter of 11/19/22    CTA HEAD NECK W WO CONTRAST    Narrative  History: Right arm weakness since this morning    Comments: CT ANGIOGRAM OF THE NECK AND CT ANGIOGRAM OF THE [place] was  obtained following the administration of IV contrast. IV contrast was  administered to evaluate the arterial vasculature. Reformatted images in the  coronal and sagittal planes as well as 3-D imaging was obtained and reviewed on  a dedicated PACS and [address] Radiation reduction dose techniques  were used for the study. Our CT scanner use one or all of the following-  Automated exposure control, adjustment of the mA and/or KV according the patient  size, iterative reconstruction. All measurements are based upon NASCET criteria  if appropriate. This study was analyzed by the [address].Algorithm    Findings:    CT ANGIOGRAM OF THE NECK: The arch and proximal great vessels are patent with  diffuse atherosclerotic changes diffuse atherosclerotic changes of the carotid  bulbs are noted with approximately 50-55% narrowing of the right carotid bulb  and 55-60% narrowing of the proximal right internal carotid artery. The left  demonstrates less than 50% narrowing. The proximal vertebral arteries are patent  with diffuse atherosclerotic changes noted    Incompletely evaluated right and possibly left pleural effusion. Chronic lung  changes are present. Probable pulmonary edema. The thyroid gland is grossly  unremarkable. CT angiogram of [place]:    The petrous, cavernous, and supraclinoid internal carotid arteries demonstrate  diffuse atherosclerotic changes however no significant stenosis.  There is a  prominent infundibulum of the fetal origin of the left posterior cerebral  artery. The anterior circulation is patent. The middle cerebral arteries are patent. The distal vertebral arteries posterior inferior cerebellar arteries, basilar  artery and posterior cerebral arteries are patent. The dural venous sinuses are not optimally visualized however grossly appear  patent. Impression  1. Diffuse atherosclerotic changes noted within the neck and [place]  with more advanced changes noted of the right carotid bulb and proximal right  internal carotid artery as described above. 2. Chronic lung changes with incompletely evaluated right pleural effusion and  probable pulmonary edema, congestive heart failure likely. Most recent Echo  Results for orders placed in visit on 04/06/22    Transthoracic echocardiogram (TTE) complete with contrast, bubble, strain, and 3D PRN    Narrative  This is a summary report. The complete report is available in the patient's medical record. If you cannot access the medical record, please contact the sending organization for a detailed fax or copy. Left Ventricle: Left ventricle is severely dilated. Normal wall thickness. Severe global hypokinesis present. Severely reduced left ventricular systolic function with a visually estimated EF of 15 - 20%. Grade III diastolic dysfunction with increased LAP. Aortic Valve: There is restriction of the left coronary cusp. There is borderline mild aortic stenosis. There is possibly severe aortic regurgitation which is eccentric (difficult to accurately comment on with eccentric nature). Recommend a CARY for further evaluation. Contrast used: Definity. Most recent lipid panels  Lab Results   Component Value Date/Time    CHOL 113 11/20/2022 04:41 AM    HDL 34 11/20/2022 04:41 AM    VLDL 13 04/04/2022 12:09 PM       Most recent Hgb A1C  No results found for: HBA1C, [id number]      Assessment/Plan:  1 MRI demonstrates no evidence of stroke. Review of the history reveals that the symptoms came on after he had arisen from bed and it is unlikely therefore to represent a radial neuropathy but is more likely a TIA  Recommendations  The patient's carotid artery disease is not felt to be a significant risk as he does not have severe stenosis on the left side  On either side does not exceed 70%  Major concern is the patient's impaired ejection fraction. In terms of optimum protection for cerebrovascular event would appreciate opinion from cardiology he may be better served by anticoagulation, although as noted in my note yesterday overall data with reference to this type of scenario is in no way definitive from the literature standpoint.   Importance however her cardiac exam is implicit in the report above with regards findings in the chest on his CTA  Follow-up appointment with Lex Elise in stroke clinic          Julio Cesar Flores MD

## 2022-11-20 NOTE — PROGRESS NOTES
SPEECH PATHOLOGY NOTE    Consult appreciated and chart reviewed. Pt awake/alert and sitting upright in bed with sister present. Pt and sister deny any speech, language, cognition, or swallow deficits as described by SLP. Pt conversing intelligibly without deficits noted. Pt stated he just finished eating evening meal and denies any dysphagia as described by SLP. Sister confirmed this. Pt politely declined po trials from SLP. No skilled SLP intervention warranted at this time. Recommend continue diet as ordered.     Thank you for this referral,  Sujata Daugherty, CCC-SLP

## 2022-11-20 NOTE — PROGRESS NOTES
ACUTE PHYSICAL THERAPY GOALS:   (Developed with and agreed upon by patient and/or caregiver.)   Patient will ambulate 300' independently within 1 treatment session. GOAL MET 11/20/2022    PHYSICAL THERAPY Initial Assessment, Discharge, and AM  (Link to Caseload Tracking: PT Visit Days : 1  Acknowledge Orders  Time In/Out  PT Charge Capture  Rehab Caseload Tracker    Lili Franklin is a 73 y.o. male   PRIMARY DIAGNOSIS: TIA (transient ischemic attack)  TIA (transient ischemic attack) [G45.9]  Right hand weakness [R29.898]       Reason for Referral: Difficulty in walking, Not elsewhere classified (R26.2)  Inpatient: Payor: Sarah Martin / Plan: MEDICARE PART A / Product Type: *No Product type* /     ASSESSMENT:     REHAB RECOMMENDATIONS:   Recommendation to date pending progress:  Setting:  No further skilled therapy after discharge from hospital    Equipment:    None     ASSESSMENT:  Mr. Florencio Lima admitted with above diagnoses. Patient currently reports no issues other than LE swelling that is normal for him due to \"a leaky heart valve\". Patient reports some difficulty ambulating first thing in the morning but mobility improves over time. Patient is independent at baseline without a device, lives alone, drives, and works. Patient manages all self care and household daily activities. He was able to demonstrate independence with mobility, ambulate without an assistive device (though he did occasionally use the rail on the wall when available), and transfer to a chair. Patient has been up ad domenico in the room. Patient denies any concerns with returning home. He does not demonstrate any acute or d/c therapy needs at this time.        [address] [facility] [address] [address] AM-PAC 6 Clicks Basic Mobility Inpatient Short Form  AM-PAC Mobility Inpatient   How much difficulty turning over in bed?: None  How much difficulty sitting down on / standing up from a chair with arms?: None  How much difficulty moving from lying on back to sitting on side of bed?: None  How much help from another person moving to and from a bed to a chair?: None  How much help from another person needed to walk in hospital room?: None  How much help from another person for climbing 3-5 steps with a railing?: None  AM-PAC Inpatient Mobility Raw Score : 24  AM-PAC Inpatient T-Scale Score : 61.14  Mobility Inpatient CMS 0-100% Score: 0  Mobility Inpatient CMS G-Code Modifier : CH    SUBJECTIVE:   Mr. Wendy Reynolds agreeable. Denies any concerns.     Social/Functional Lives With: Alone  Type of Home: House  Home Layout: Two level, Bed/Bath upstairs  Home Access: Level entry  Bathroom Shower/Tub: Tub/Shower unit  ADL Assistance: Independent  Ambulation Assistance: Independent  Active : Yes  Occupation: Full time employment    OBJECTIVE:     PAIN: VITALS / O2: PRECAUTION / Yaneth Norberto / DRAINS:   Pre Treatment:   Pain Assessment: 0-10  Pain Level: 0      Post Treatment: 0 Vitals        Oxygen      IV    RESTRICTIONS/PRECAUTIONS:                    GROSS EVALUATION: Intact Impaired (Comments):   AROM []  Generally decreased, functional   PROM []    Strength []  Generally decreased, functional   Balance [] Sitting - Static: Good  Sitting - Dynamic: Good  Standing - Static: Good  Standing - Dynamic: Fair, +   Posture [] N/A   Sensation [x]     Coordination [x]      Tone [x]     Edema [] B LE swelling   Activity Tolerance []  Generally decreased, functional    []      COGNITION/  PERCEPTION: Intact Impaired (Comments):   Orientation [x]     Vision [x]     Hearing [x]     Cognition  [x]       MOBILITY: I Mod I S SBA CGA Min Mod Max Total  NT x2 Comments:   Bed Mobility    Rolling [] [] [] [] [] [] [] [] [] [] []    Supine to Sit [x] [] [] [] [] [] [] [] [] [] []    Scooting [x] [] [] [] [] [] [] [] [] [] []    Sit to Supine [] [] [] [] [] [] [] [] [] [] []    Transfers    Sit to Stand [x] [] [] [] [] [] [] [] [] [] []    Bed to Chair [x] [] [] [] [] [] [] [] [] [] []    Stand to Sit [x] [] [] [] [] [] [] [] [] [] []     [] [] [] [] [] [] [] [] [] [] []    I=Independent, Mod I=Modified Independent, S=Supervision, SBA=Standby Assistance, CGA=Contact Guard Assistance,   Min=Minimal Assistance, Mod=Moderate Assistance, Max=Maximal Assistance, Total=Total Assistance, NT=Not Tested    GAIT: I Mod I S SBA CGA Min Mod Max Total  NT x2 Comments:   Level of Assistance [x] [] [] [] [] [] [] [] [] [] []    Distance 300 feet    DME None and occasional use of rail on wall when available    Gait Quality Decreased elyssa , Decreased step length, Trunk sway increased, and Wide base of support    Weightbearing Status      Stairs      I=Independent, Mod I=Modified Independent, S=Supervision, SBA=Standby Assistance, CGA=Contact Guard Assistance,   Min=Minimal Assistance, Mod=Moderate Assistance, Max=Maximal Assistance, Total=Total Assistance, NT=Not Tested    PLAN:   FREQUENCY AND DURATION:   evaluation/discharge    THERAPY PROGNOSIS: Good    PROBLEM LIST:   (Skilled intervention is medically necessary to address:)  none INTERVENTIONS PLANNED:   (Benefits and precautions of physical therapy have been discussed with the patient.)  Therapeutic Activity       TREATMENT:   EVALUATION: LOW COMPLEXITY: (Untimed Charge)    TREATMENT:   Therapeutic Activity (20 Minutes): Therapeutic activity included Supine to Sit, Scooting, Transfer Training, Ambulation on level ground, Sitting balance , and Standing balance to improve functional Activity tolerance, Mobility, and Strength.     TREATMENT GRID:  N/A    AFTER TREATMENT PRECAUTIONS: Bed/Chair Locked, Call light within reach, Chair, and Needs within reach    INTERDISCIPLINARY COLLABORATION:  RN/ PCT    EDUCATION: Education Given To: Patient  Education Provided: Role of Therapy;Plan of Care  Education Method: Verbal  Education Outcome: Verbalized understanding    TIME IN/OUT:  Time In: 0825  Time Out: 0845  Minutes: [address], PT

## 2022-11-20 NOTE — PROGRESS NOTES
ACUTE OCCUPATIONAL THERAPY GOALS:   (Developed with and agreed upon by patient and/or caregiver.)  Pt will be (I) with ADL's and ambulated short distances. OCCUPATIONAL THERAPY Initial Assessment, Daily Note, Discharge, and AM          Acknowledge Orders  Time  OT Charge Capture  Rehab Caseload Tracker      Pepper Castle is a 73 y.o. male   PRIMARY DIAGNOSIS: TIA (transient ischemic attack)  TIA (transient ischemic attack) [G45.9]  Right hand weakness [R29.898]       Reason for Referral: Generalized Muscle Weakness (M62.81)  Inpatient: Payor: Susy Vivas / Plan: MEDICARE PART A / Product Type: *No Product type* /     ASSESSMENT:     REHAB RECOMMENDATIONS:   Recommendation to date pending progress:  Setting:  No further skilled therapy after discharge from hospital    Equipment:    None     ASSESSMENT:  Mr. Chiquita Mesa was seen in room and is noted to move slow but up in room independent with and independent with self care. Pt lives alone but express having no concerns about going home. No further OT warranted. Pt was up in room and had his lunch sitting at the table in his room. Pt without difficulty with self care in room.      [address]\[address] [address] AM-[facility] 6 Clicks Daily Activity Inpatient Short Form:    AM-PAC Daily Activity Inpatient   How much help for putting on and taking off regular lower body clothing?: None  How much help for Bathing?: None  How much help for Toileting?: None  How much help for putting on and taking off regular upper body clothing?: None  How much help for taking care of personal grooming?: None  How much help for eating meals?: None  AM-PAC Inpatient Daily Activity Raw Score: 24  AM-PAC Inpatient ADL T-Scale Score : 57.54  ADL Inpatient CMS 0-100% Score: 0  ADL Inpatient CMS G-Code Modifier : CH           SUBJECTIVE:     Mr. Chiquita Mesa states, he does fine and home     Social/Functional Lives With: Alone  Type of Home: House  Home Layout: Two level, Bed/Bath upstairs  Home Access: Level entry  Bathroom Shower/Tub: Tub/Shower unit  ADL Assistance: Independent  Ambulation Assistance: Independent  Active : Yes  Occupation: Full time employment    OBJECTIVE:          PAIN: VITALS / O2:   Pre Treatment:          Post Treatment: no complaint of pain        Vitals          Oxygen            GROSS EVALUATION: INTACT IMPAIRED   (See Comments)   UE AROM [x] []   UE PROM [x] []   Strength [x]       Posture / Balance [x]     Sensation [x]     Coordination [x]       Tone [x]       Edema []    Activity Tolerance [x]       Hand Dominance R [] L []      COGNITION/  PERCEPTION: INTACT IMPAIRED   (See Comments)   Orientation [x]     Vision [x]     Hearing [x]     Cognition  [x]     Perception [x]       MOBILITY: I Mod I S SBA CGA Min Mod Max Total  NT x2 Comments:   Bed Mobility    Rolling [] [] [] [] [] [] [] [] [] [] []    Supine to Sit [] [] [] [] [] [] [] [] [] [] []    Scooting [] [] [] [] [] [] [] [] [] [] []    Sit to Supine [] [] [] [] [] [] [] [] [] [] []    Transfers    Sit to Stand [x] [] [] [] [] [] [] [] [] [] []    Bed to Chair [x] [] [] [] [] [] [] [] [] [] []    Stand to Sit [x] [] [] [] [] [] [] [] [] [] []    Tub/Shower [x] [] [] [] [] [] [] [] [] [] []     Toilet [x] [] [] [] [] [] [] [] [] [] []      [] [] [] [] [] [] [] [] [] [] []    I=Independent, Mod I=Modified Independent, S=Supervision/Setup, SBA=Standby Assistance, CGA=Contact Guard Assistance, Min=Minimal Assistance, Mod=Moderate Assistance, Max=Maximal Assistance, Total=Total Assistance, NT=Not Tested    ACTIVITIES OF DAILY LIVING: I Mod I S SBA CGA Min Mod Max Total NT Comments   BASIC ADLs:              Upper Body Bathing  [x] [] [] [] [] [] [] [] [] []    Lower Body Bathing [x] [] [] [] [] [] [] [] [] []    Toileting [x] [] [] [] [] [] [] [] [] []    Upper Body Dressing [x] [] [] [] [] [] [] [] [] []    Lower Body Dressing [x] [] [] [] [] [] [] [] [] []    Feeding [x] [] [] [] [] [] [] [] [] []    Grooming [x] [] [] [] [] [] [] [] [] []    Personal Device Care [] [] [] [] [] [] [] [] [] []    Functional Mobility [x] [] [] [] [] [] [] [] [] []    I=Independent, Mod I=Modified Independent, S=Supervision/Setup, SBA=Standby Assistance, CGA=Contact Guard Assistance, Min=Minimal Assistance, Mod=Moderate Assistance, Max=Maximal Assistance, Total=Total Assistance, NT=Not Tested    PLAN:   [facility] of Care:  (1 treatment) for duration of hospital stay or until stated goals are met, whichever comes first.    PROBLEM LIST:   (Skilled intervention is medically necessary to address:)  Decreased ADL/Functional Activities   INTERVENTIONS PLANNED:  (Benefits and precautions of occupational therapy have been discussed with the patient.)  Self Care Training  Education         TREATMENT:     EVALUATION: LOW COMPLEXITY: (Untimed Charge)    TREATMENT:   Self Care: (10 min): Procedure(s) (per grid) utilized to improve and/or restore self-care/home management as related to  functional mobility . Required no   cueing to facilitate activities of daily living skills. AFTER TREATMENT PRECAUTIONS: Bed/Chair Locked, Call light within reach, Chair, and Needs within reach    INTERDISCIPLINARY COLLABORATION:  PT/ PTA and OT/ PORTER    EDUCATION:  Education Given To: Patient  Education Provided: Role of Therapy;Plan of Care; Fall Prevention Strategies  Education Outcome: Verbalized understanding;Demonstrated understanding    TOTAL TREATMENT DURATION AND TIME:  Time In: [address]  Time Out: [place]  Minutes: [address], OT

## 2022-11-20 NOTE — CARE COORDINATION
SW met with patient who confirmed demographic information, states that he lives alone but has a sister who is local. Patient anticipates that he may move in with younger sister at some point and seems excited about this prospect. Patient is seen by Dr. Landen Franklin and is current. Patient does not use assistive devices, denies any falls, and is independent in his ADLs. Patient feels at his baseline. Patient discussed with therapy, no post discharge therapy needs anticipated. Home with family assistance.      ASSESSMENT NOTE    Attending Physician: Mika Martinez MD  Admit Problem: TIA (transient ischemic attack) [G45.9]  Right hand weakness [R29.898]  Date/Time of Admission: 11/19/2022  9:19 AM  Problem List:  Patient Active Problem List   Diagnosis    Dyspnea    Benign essential hypertension    Diverticulosis of intestine    History of smoking 25-50 pack years    Male erectile disorder    ILD (interstitial lung disease) ([facility] [address]    Vasculogenic erectile dysfunction    Bronchiectasis without complication (HCC)    Hyperlipidemia    Early syphilis, syphilitic uveitis    Class 1 obesity due to excess calories with serious comorbidity and body mass index (BMI) of 34.0 to 34.9 in adult    Clubbing of fingers    Primary osteoarthritis of both knees    Pulmonary infiltrates    Benign neoplasm of colon    Vitamin D deficiency    Dilated cardiomyopathy (HCC)    CHF (congestive heart failure) (HCC)    Incarcerated left inguinal hernia    TIA (transient ischemic attack)       Service Assessment  Patient Orientation Alert and Oriented, Self, Situation, Place, Person   Cognition Alert   History Provided By Patient   Primary Caregiver Self   Accompanied By/Relationship     Support Systems Family Members   Patient's Healthcare Decision Maker is:     PCP Verified by CM Yes   Last Visit to PCP     Prior Functional Level Independent in ADLs/IADLs   Current Functional Level Independent in ADLs/IADLs   Can patient return to prior living arrangement Yes   Ability to make needs known: Good   Family able to assist with home care needs: Yes   Would you like for me to discuss the discharge plan with any other family members/significant others, and if so, who? Yes   Financial Resources [employer] Other (Comment) (N/A)   CM/SW Referral       Social/Functional History  Lives With Alone   Type of Home House   Home Layout Two level, Bed/Bath upstairs   Home Access Level entry   Entrance Stairs - Number of Steps     Entrance Stairs - Rails     Bathroom Shower/Tub Tub/Shower unit   Bathroom Toilet     Bathroom Equipment     Bathroom Accessibility     Home Equipment     Receives Help From     [id number] [employer]     Grooming     Feeding     [address] [place] Work     Driving     Shopping          Other (Comment)     Homemaking Responsibilities     Meal Prep Responsibility     Laundry Responsibility     Cleaning Responsibility     Bill Paying/Finance Responsibility     [address] Management     Other (Comment)     Ambulation Assistance Independent   Transfer Assistance     Active  Yes   Patient's  Info     Mode of Transportation     Education     Occupation Full time employment   Type of Occupation       Discharge Planning   Type of Residence [address] [place] [address] Family Members   Current Services Prior To Admission None   Potential Assistance Needed N/A   DME     DME     DME Ordered? Potential Assistance Purchasing Medications     Meds-to-Beds: Does the patient want to have any new prescriptions delivered to bedside prior to discharge?      Type of Home Care Services None   Patient expects to be discharged to: House   Follow Up Appointment: Best Day/Time     One/Two Story Residence:     # of [employer] Steps     Height of Each Step (in)     [employer] Available     History of Falls? Services At/After Discharge  Transition of Care Consult (CM Consult): Internal Home Health     Internal Hospice     Reason Outside Agency [address]     Partner SNF     Reason Why Partner SNF Not Chosen     Internal Comfort Care     Reason Outside [address] Discharge     [address] Provided? Mode of Transport at [facility] Time of Discharge     Confirm Follow Up Transport       Condition of Participation: Discharge Planning  The plan for Transition of Care is related to the following treatment goals: The Patient and/or Patient Representative was provided with a Choice of Provider? Name of the Patient Representative who was provided with the Choice of Provider and agrees with the Discharge Plan? The Patient and/or Patient Representative Agree with the Discharge Plan? Freedom of Choice list was provided with basic dialogue that supports the individualized plan of care/goals, treatment preferences, and shares the quality data associated with the providers?        Documentation for Discharge Appeal  Discharge Appealed by     Date notified by QIO of appeal request:     Time notified by QIO of appeal request:     Detailed Notice of Discharge given to:     Date Notice of Discharge given:     Time Notice of Discharge given:     Date records sent to QIO     Time records sent to Mary Ellen Lela     Date Notified of Outcome     Time Notified of Outcome     Outcome of appeal           SAMANTHA Aleman 11/20/22 1:39 PM    Arlen Field LMSW    [address], [address] Work   [address] [facility]

## 2022-11-20 NOTE — PROGRESS NOTES
NIH score 0. Patient states left shoulder is stiff, but this is baseline PTA. A/O X4, follows commands. Clear speech. Denies numbness or tingling, states  are equal. States all symptoms have resolved at this time. Ambulates to BR with some weakness in legs r/t pitting edema.

## 2022-11-20 NOTE — PROGRESS NOTES
Hospitalist Progress Note   Admit Date:  2022  9:19 AM   Name:  Gosia Barlow   Age:  73 y.o. Sex:  male  :  1955   MRN:  [id number]   Room:  [facility]/01    Presenting Complaint: Hand Numbness     Reason(s) for Admission: TIA (transient ischemic attack) [G45.9]  Right hand weakness [facility] Course: Adry Harrison is a 73 y.o. male with medical history of AR, cardiomyopathy, CHF, hyperlipidemia, hypertension, ILD, osteoarthritis of knees and past surgical history of hernia repair presented with right-sided weakness of the hand at 4 AM today. He was unable to grab the cup with his right hand and pick it up. He felt there was no  which lasted for 1 hour. It is associate with a tingling and numbness. After 1 hour weakness improved. He lives alone and ambulates independently. This is his first episode never felt like this before. Family history of heart attack of his father at the age of late 62s. He quit smoking in . Patient denies chest pain, shortness of breath, headache, nausea, vomiting, diarrhea, dysphagia, dysphonia, slurring of the speech. Subjective & 24hr Events (22): Patient states that his right hand function has returned to normal.      Assessment & Plan:     Principal Problem:    TIA (transient ischemic attack)  Plan: TSH, Hga1c and lipid panel wnl. Neurology wants Cardiology to evaluate if patient needs to place on DAPT vs anticoagulation. Will consult Cardiollgy. MRI negative for acute CVA. Await echo results. Will continue telemetry. PT/OT states he has no needs     HTN  Plan: will restart bp medications      HLP  Plan: continue HI statin     ILD  Plan: aware, will continue 2 LNC oxygen      Obesity  Plan:  complicates care, lifestyle modifications encouraged            Anticipated discharge needs:      None    Diet:  ADULT DIET;  Regular; Low Fat/Low Chol/High Fiber/MARIELENA  DVT PPx: lovenox  Code status: Full Code    Hospital Problems:  Principal Problem:    TIA (transient ischemic attack)  Resolved Problems:    * No resolved hospital problems. *      Objective:   Patient Vitals for the past 24 hrs:   Temp Pulse Resp BP SpO2   11/20/22 1236 98.4 °F (36.9 °C) 93 16 101/64 99 %   11/20/22 0830 -- 87 -- -- 96 %   11/20/22 0825 -- 86 15 -- 98 %   11/20/22 0759 97.7 °F (36.5 °C) 88 17 92/66 97 %   11/20/22 0741 -- 92 -- -- --   11/20/22 0315 98.2 °F (36.8 °C) 92 16 96/66 97 %   11/20/22 0054 98.1 °F (36.7 °C) 93 18 93/67 93 %   11/19/22 1735 97.7 °F (36.5 °C) 79 21 102/75 92 %       Oxygen Therapy  SpO2: 99 %  Pulse Oximeter Device Mode: Intermittent  Pulse Oximeter Device Location: Right, Finger  O2 Device: None (Room air)  O2 Flow Rate (L/min): 0 L/min    Estimated body mass index is 34.67 kg/m² as calculated from the following:    Height as of this encounter: 5' 8\" (1.727 m). Weight as of this encounter: 228 lb (103.4 kg). Intake/Output Summary (Last 24 hours) at 11/20/2022 1614  Last data filed at 11/20/2022 0539  Gross per 24 hour   Intake 1450 ml   Output --   Net 1450 ml         Physical Exam:     Blood pressure 101/64, pulse 93, temperature 98.4 °F (36.9 °C), resp. rate 16, height 5' 8\" (1.727 m), weight 228 lb (103.4 kg), SpO2 99 %. General:    Well nourished. Head:  Normocephalic, atraumatic  Eyes:  Sclerae appear normal.  Pupils equally round. ENT:  Nares appear normal, no drainage. Moist oral mucosa  Neck:  No restricted ROM. Trachea midline   CV:   RRR. No m/r/g. No jugular venous distension. Lungs:   CTAB. No wheezing, rhonchi, or rales. Symmetric expansion. Abdomen: Bowel sounds present. Soft, nontender, nondistended. Extremities: No cyanosis or clubbing. No edema  Skin:     No rashes and normal coloration. Warm and dry. Neuro:  CN II-XII grossly intact. Sensation intact. A&Ox3  Psych:  Normal mood and affect.       I have personally reviewed labs and tests showing:  Recent Labs:  Recent Results (from the past 48 hour(s))   CBC    Collection Time: 11/19/22  9:34 AM   Result Value Ref Range    WBC 6.6 4.3 - 11.1 K/uL    RBC 4.70 4.23 - 5.6 M/uL    Hemoglobin 15.5 13.6 - 17.2 g/dL    Hematocrit 48.2 41.1 - 50.3 %    .6 (H) 82.0 - 102.0 FL    MCH 33.0 (H) 26.1 - 32.9 PG    MCHC 32.2 31.4 - 35.0 g/dL    RDW 14.6 11.9 - 14.6 %    Platelets 038 016 - 594 K/uL    MPV 9.2 (L) 9.4 - 12.3 FL    nRBC 0.00 0.0 - 0.2 K/uL   Magnesium    Collection Time: 11/19/22  9:34 AM   Result Value Ref Range    Magnesium 1.9 1.8 - 2.4 mg/dL   Comprehensive Metabolic Panel    Collection Time: 11/19/22  9:34 AM   Result Value Ref Range    Sodium 139 133 - 143 mmol/L    Potassium 3.7 3.5 - 5.1 mmol/L    Chloride 105 101 - 110 mmol/L    CO2 27 21 - 32 mmol/L    Anion Gap 7 2 - 11 mmol/L    Glucose 103 (H) 65 - 100 mg/dL    BUN 20 8 - 23 MG/DL    Creatinine 1.66 (H) 0.8 - 1.5 MG/DL    Est, Glom Filt Rate 45 (L) >60 ml/min/1.73m2    Calcium 10.1 8.3 - 10.4 MG/DL    Total Bilirubin 2.6 (H) 0.2 - 1.1 MG/DL    ALT 30 12 - 65 U/L    AST 24 15 - 37 U/L    Alk Phosphatase 120 50 - 136 U/L    Total Protein 7.5 6.3 - 8.2 g/dL    Albumin 3.5 3.2 - 4.6 g/dL    Globulin 4.0 2.8 - 4.5 g/dL    Albumin/Globulin Ratio 0.9 0.4 - 1.6     Protime-INR    Collection Time: 11/19/22  9:34 AM   Result Value Ref Range    Protime 17.2 (H) 12.6 - 14.3 sec    INR 1.4     Potassium    Collection Time: 11/19/22 11:13 PM   Result Value Ref Range    Potassium 3.7 3.5 - 5.1 mmol/L   Magnesium    Collection Time: 11/19/22 11:13 PM   Result Value Ref Range    Magnesium 1.6 (L) 1.8 - 2.4 mg/dL   Hemoglobin A1c    Collection Time: 11/20/22  4:41 AM   Result Value Ref Range    Hemoglobin A1C 5.9 (H) 4.8 - 5.6 %    eAG 123 mg/dL   CBC with Auto Differential    Collection Time: 11/20/22  4:41 AM   Result Value Ref Range    WBC 6.4 4.3 - 11.1 K/uL    RBC 4.22 (L) 4.23 - 5.6 M/uL    Hemoglobin 14.1 13.6 - 17.2 g/dL    Hematocrit 42.3 41.1 - 50.3 % .2 82.0 - 102.0 FL    MCH 33.4 (H) 26.1 - 32.9 PG    MCHC 33.3 31.4 - 35.0 g/dL    RDW 14.7 (H) 11.9 - 14.6 %    Platelets 706 083 - 926 K/uL    MPV 9.2 (L) 9.4 - 12.3 FL    nRBC 0.00 0.0 - 0.2 K/uL    Differential Type AUTOMATED      Seg Neutrophils 62 43 - 78 %    Lymphocytes 26 13 - 44 %    Monocytes 10 4.0 - 12.0 %    Eosinophils % 1 0.5 - 7.8 %    Basophils 1 0.0 - 2.0 %    Immature Granulocytes 0 0.0 - 5.0 %    Segs Absolute 4.0 1.7 - 8.2 K/UL    Absolute Lymph # 1.7 0.5 - 4.6 K/UL    Absolute Mono # 0.6 0.1 - 1.3 K/UL    Absolute Eos # 0.1 0.0 - 0.8 K/UL    Basophils Absolute 0.1 0.0 - 0.2 K/UL    Absolute Immature Granulocyte 0.0 0.0 - 0.5 K/UL   Basic Metabolic Panel w/ Reflex to MG    Collection Time: 11/20/22  4:41 AM   Result Value Ref Range    Sodium 137 133 - 143 mmol/L    Potassium 3.9 3.5 - 5.1 mmol/L    Chloride 106 101 - 110 mmol/L    CO2 24 21 - 32 mmol/L    Anion Gap 7 2 - 11 mmol/L    Glucose 97 65 - 100 mg/dL    BUN 20 8 - 23 MG/DL    Creatinine 1.36 0.8 - 1.5 MG/DL    Est, Glom Filt Rate 58 (L) >60 ml/min/1.73m2    Calcium 9.2 8.3 - 10.4 MG/DL   Phosphorus    Collection Time: 11/20/22  4:41 AM   Result Value Ref Range    Phosphorus 2.8 2.3 - 3.7 MG/DL   Lipid Panel    Collection Time: 11/20/22  4:41 AM   Result Value Ref Range    Cholesterol, Total 113 MG/DL    Triglycerides 57 35 - 150 MG/DL    HDL 34 (L) 40 - 60 MG/DL    LDL Calculated 67.6 <100 MG/DL    VLDL Cholesterol Calculated 11.4 6.0 - 23.0 MG/DL    Chol/HDL Ratio 3.3 <200         I have personally reviewed imaging studies showing: Other Studies:  MRI brain without contrast   Final Result   1. No evidence of acute infarction. 2.  Findings most compatible with mild chronic small vessel ischemic change. CTA HEAD NECK W WO CONTRAST   Final Result   1.  Diffuse atherosclerotic changes noted within the neck and [place]   with more advanced changes noted of the right carotid bulb and proximal right   internal carotid artery as described above. 2. Chronic lung changes with incompletely evaluated right pleural effusion and   probable pulmonary edema, congestive heart failure likely. CT HEAD WO CONTRAST   Final Result      1. Findings most compatible with mild chronic small vessel ischemic changes. 2. Otherwise unremarkable unenhanced CT scan of the brain. Current Meds:  Current Facility-Administered Medications   Medication Dose Route Frequency    ondansetron (ZOFRAN-ODT) disintegrating tablet 4 mg  4 mg Oral Q8H PRN    Or    ondansetron (ZOFRAN) injection 4 mg  4 mg IntraVENous Q6H PRN    polyethylene glycol (GLYCOLAX) packet 17 g  17 g Oral Daily PRN    enoxaparin Sodium (LOVENOX) injection 30 mg  30 mg SubCUTAneous Q12H    atorvastatin (LIPITOR) tablet 80 mg  80 mg Oral Nightly    clopidogrel (PLAVIX) tablet 75 mg  75 mg Oral Daily    aspirin EC tablet 81 mg  81 mg Oral Daily    bumetanide (BUMEX) tablet 1 mg  1 mg Oral BID    metoprolol succinate (TOPROL XL) extended release tablet 25 mg  25 mg Oral Daily    spironolactone (ALDACTONE) tablet 25 mg  25 mg Oral Daily    [Held by provider] valsartan (DIOVAN) tablet 160 mg  160 mg Oral Daily    Nintedanib Esylate CAPS 150 mg (Patient Supplied)  150 mg Oral BID    mometasone-formoterol (DULERA) 200-5 MCG/ACT inhaler 2 puff  2 puff Inhalation BID    tiotropium (SPIRIVA RESPIMAT) 2.5 MCG/ACT inhaler 2 puff  2 puff Inhalation Daily       Signed:  Brandie Cross MD    Part of this note may have been written by using a voice dictation software. The note has been proof read but may still contain some grammatical/other typographical errors.

## 2022-11-20 NOTE — PROGRESS NOTES
NIH score remains 0. NIH has been Q4 hours for 6 occurrences as ordered. Will continue with Q shift and handoff NIH assessments.

## 2022-11-21 ENCOUNTER — APPOINTMENT (OUTPATIENT)
Dept: NON INVASIVE DIAGNOSTICS | Age: 67
DRG: 069 | End: 2022-11-21
Payer: COMMERCIAL

## 2022-11-21 VITALS
RESPIRATION RATE: 18 BRPM | DIASTOLIC BLOOD PRESSURE: 69 MMHG | OXYGEN SATURATION: 100 % | BODY MASS INDEX: 34.56 KG/M2 | SYSTOLIC BLOOD PRESSURE: 97 MMHG | TEMPERATURE: 97.5 F | HEIGHT: 68 IN | HEART RATE: 87 BPM | WEIGHT: 228 LBS

## 2022-11-21 PROBLEM — I35.1 AORTIC REGURGITATION: Status: ACTIVE | Noted: 2022-11-21

## 2022-11-21 PROBLEM — I25.10 CAD (CORONARY ARTERY DISEASE): Status: ACTIVE | Noted: 2022-11-21

## 2022-11-21 PROBLEM — I42.0 DILATED CARDIOMYOPATHY (HCC): Status: ACTIVE | Noted: 2022-04-18

## 2022-11-21 PROBLEM — J84.9 ILD (INTERSTITIAL LUNG DISEASE) (HCC): Status: ACTIVE | Noted: 2020-03-06

## 2022-11-21 LAB
ECHO AO ASC DIAM: 3.5 CM
ECHO AO ASCENDING AORTA INDEX: 1.62 CM/M2
ECHO AO ROOT DIAM: 3.7 CM
ECHO AO ROOT INDEX: 1.71 CM/M2
ECHO AV AREA PEAK VELOCITY: 1.7 CM2
ECHO AV AREA VTI: 1.7 CM2
ECHO AV AREA/BSA PEAK VELOCITY: 0.8 CM2/M2
ECHO AV AREA/BSA VTI: 0.8 CM2/M2
ECHO AV MEAN GRADIENT: 16 MMHG
ECHO AV MEAN VELOCITY: 1.9 M/S
ECHO AV PEAK GRADIENT: 25 MMHG
ECHO AV PEAK VELOCITY: 2.5 M/S
ECHO AV VELOCITY RATIO: 0.44
ECHO AV VTI: 46.8 CM
ECHO BSA: 2.23 M2
ECHO EST RA PRESSURE: 15 MMHG
ECHO IVC EXP: 2.5 CM
ECHO LA AREA 2C: 35.6 CM2
ECHO LA AREA 4C: 30.1 CM2
ECHO LA MAJOR AXIS: 7.9 CM
ECHO LA MINOR AXIS: 7.6 CM
ECHO LA VOL 2C: 141 ML (ref 18–58)
ECHO LA VOL 4C: 94 ML (ref 18–58)
ECHO LA VOL BP: 117 ML (ref 18–58)
ECHO LA VOL/BSA BIPLANE: 54 ML/M2 (ref 16–34)
ECHO LA VOLUME INDEX A2C: 65 ML/M2 (ref 16–34)
ECHO LA VOLUME INDEX A4C: 44 ML/M2 (ref 16–34)
ECHO LV E' LATERAL VELOCITY: 13 CM/S
ECHO LV EDV A2C: 245 ML
ECHO LV EDV A4C: 235 ML
ECHO LV EDV INDEX A4C: 109 ML/M2
ECHO LV EDV NDEX A2C: 113 ML/M2
ECHO LV EJECTION FRACTION A2C: 25 %
ECHO LV EJECTION FRACTION A4C: 28 %
ECHO LV EJECTION FRACTION BIPLANE: 26 % (ref 55–100)
ECHO LV ESV A2C: 184 ML
ECHO LV ESV A4C: 170 ML
ECHO LV ESV INDEX A2C: 85 ML/M2
ECHO LV ESV INDEX A4C: 79 ML/M2
ECHO LV FRACTIONAL SHORTENING: 11 % (ref 28–44)
ECHO LV INTERNAL DIMENSION DIASTOLE INDEX: 2.82 CM/M2
ECHO LV INTERNAL DIMENSION DIASTOLIC: 6.1 CM (ref 4.2–5.9)
ECHO LV INTERNAL DIMENSION SYSTOLIC INDEX: 2.5 CM/M2
ECHO LV INTERNAL DIMENSION SYSTOLIC: 5.4 CM
ECHO LV IVSD: 0.7 CM (ref 0.6–1)
ECHO LV MASS 2D: 162.8 G (ref 88–224)
ECHO LV MASS INDEX 2D: 75.3 G/M2 (ref 49–115)
ECHO LV POSTERIOR WALL DIASTOLIC: 0.7 CM (ref 0.6–1)
ECHO LV RELATIVE WALL THICKNESS RATIO: 0.23
ECHO LVOT AREA: 3.8 CM2
ECHO LVOT AV VTI INDEX: 0.45
ECHO LVOT DIAM: 2.2 CM
ECHO LVOT MEAN GRADIENT: 3 MMHG
ECHO LVOT PEAK GRADIENT: 5 MMHG
ECHO LVOT PEAK VELOCITY: 1.1 M/S
ECHO LVOT STROKE VOLUME INDEX: 37.3 ML/M2
ECHO LVOT SV: 80.5 ML
ECHO LVOT VTI: 21.2 CM
ECHO MV AREA VTI: 2.2 CM2
ECHO MV LVOT VTI INDEX: 1.75
ECHO MV MAX VELOCITY: 1.4 M/S
ECHO MV MEAN GRADIENT: 2 MMHG
ECHO MV MEAN VELOCITY: 0.7 M/S
ECHO MV PEAK GRADIENT: 8 MMHG
ECHO MV REGURGITANT PEAK GRADIENT: 88 MMHG
ECHO MV REGURGITANT PEAK VELOCITY: 4.7 M/S
ECHO MV REGURGITANT VTIA: 138 CM
ECHO MV VTI: 37.1 CM
ECHO PV ACCELERATION TIME (AT): 63 MS
ECHO PV MAX VELOCITY: 0.8 M/S
ECHO PV PEAK GRADIENT: 3 MMHG
ECHO RIGHT VENTRICULAR SYSTOLIC PRESSURE (RVSP): 43 MMHG
ECHO RV BASAL DIMENSION: 4.3 CM
ECHO RV FREE WALL PEAK S': 10 CM/S
ECHO RV LONGITUDINAL DIMENSION: 7.4 CM
ECHO RV MID DIMENSION: 3.3 CM
ECHO RV TAPSE: 1.6 CM (ref 1.7–?)
ECHO TV REGURGITANT MAX VELOCITY: 2.63 M/S
ECHO TV REGURGITANT PEAK GRADIENT: 28 MMHG
GLUCOSE BLD STRIP.AUTO-MCNC: 92 MG/DL (ref 65–100)
SERVICE CMNT-IMP: NORMAL

## 2022-11-21 PROCEDURE — 99222 1ST HOSP IP/OBS MODERATE 55: CPT | Performed by: INTERNAL MEDICINE

## 2022-11-21 PROCEDURE — 93306 TTE W/DOPPLER COMPLETE: CPT | Performed by: INTERNAL MEDICINE

## 2022-11-21 PROCEDURE — 6360000002 HC RX W HCPCS: Performed by: STUDENT IN AN ORGANIZED HEALTH CARE EDUCATION/TRAINING PROGRAM

## 2022-11-21 PROCEDURE — 6360000004 HC RX CONTRAST MEDICATION: Performed by: INTERNAL MEDICINE

## 2022-11-21 PROCEDURE — C8929 TTE W OR WO FOL WCON,DOPPLER: HCPCS

## 2022-11-21 PROCEDURE — 2580000003 HC RX 258: Performed by: INTERNAL MEDICINE

## 2022-11-21 PROCEDURE — A4216 STERILE WATER/SALINE, 10 ML: HCPCS | Performed by: INTERNAL MEDICINE

## 2022-11-21 PROCEDURE — 6370000000 HC RX 637 (ALT 250 FOR IP): Performed by: STUDENT IN AN ORGANIZED HEALTH CARE EDUCATION/TRAINING PROGRAM

## 2022-11-21 RX ORDER — ASPIRIN 81 MG/1
81 TABLET ORAL DAILY
Qty: 30 TABLET | Refills: 3 | Status: SHIPPED | OUTPATIENT
Start: 2022-11-22

## 2022-11-21 RX ORDER — ATORVASTATIN CALCIUM 80 MG/1
80 TABLET, FILM COATED ORAL NIGHTLY
Qty: 30 TABLET | Refills: 3 | Status: SHIPPED | OUTPATIENT
Start: 2022-11-21

## 2022-11-21 RX ORDER — CLOPIDOGREL BISULFATE 75 MG/1
75 TABLET ORAL DAILY
Qty: 30 TABLET | Refills: 3 | Status: SHIPPED | OUTPATIENT
Start: 2022-11-22 | End: 2022-12-10

## 2022-11-21 RX ADMIN — CLOPIDOGREL BISULFATE 75 MG: 75 TABLET ORAL at 09:55

## 2022-11-21 RX ADMIN — ENOXAPARIN SODIUM 30 MG: 100 INJECTION SUBCUTANEOUS at 05:43

## 2022-11-21 RX ADMIN — BUMETANIDE 1 MG: 1 TABLET ORAL at 09:55

## 2022-11-21 RX ADMIN — PERFLUTREN 0.45 ML: 6.52 INJECTION, SUSPENSION INTRAVENOUS at 09:25

## 2022-11-21 RX ADMIN — SPIRONOLACTONE 25 MG: 25 TABLET ORAL at 09:55

## 2022-11-21 RX ADMIN — METOPROLOL SUCCINATE 25 MG: 25 TABLET, EXTENDED RELEASE ORAL at 09:55

## 2022-11-21 RX ADMIN — ASPIRIN 81 MG: 81 TABLET, COATED ORAL at 09:55

## 2022-11-21 ASSESSMENT — ENCOUNTER SYMPTOMS
NAUSEA: 0
GASTROINTESTINAL NEGATIVE: 1
SHORTNESS OF BREATH: 0
ABDOMINAL PAIN: 0
VOMITING: 0
COUGH: 0
WHEEZING: 0
EYES NEGATIVE: 1

## 2022-11-21 NOTE — H&P
[facility] CARDIOLOGY History &Physical                   Subjective:     Patient is a 73 y.o. male with a hx of cardiomyopathy, CAD , HTN, HLD , ILD who was admitted to the hospital after he developed sudden onset of right sided hand weakness. Symptoms persistent at the patient was found to be outside of the tPA window. Admitted to hospitalist and seen by neurology. CTA and MRI brain obtained. Patient reports that symptoms have resolved and he feels at his baseline. No chest pain, racing heart, abdominal pain, nausea, vomiting, palpitations, irregular heart beats. Chronic leg edema continues he reports. No obvious alleviating or aggravating factors for his presentation. No other associate symptoms. States he has been taking his medications at home as directed including Bumex twice daily. Cardiology asked to see patient regarding indication for systemic anticoagulation versus antiplatelet strategy. Lab Results   Component Value Date     11/20/2022    K 3.9 11/20/2022    MG 1.6 (L) 11/19/2022    BUN 20 11/20/2022    WBC 6.4 11/20/2022    HGB 14.1 11/20/2022     11/20/2022    INR 1.4 11/19/2022    TSH 1.870 04/04/2022    ESR 13 02/20/2020        Past Medical History:   Diagnosis Date    Aortic regurgitation     mod-severe eccentric-  ECHO 5/5/22; per MD note 8/8/22 not controlled    Bronchiectasis ([address]     Cardiomyopathy ([address]     CHF (congestive heart failure) ([address]     ED (erectile dysfunction)     Edema     H/O coronary angiogram 05/05/2022    cath report: mild LM disease, severe MV, severe LV dysfunction, increased LVEDP.  MD note 8/8/22: [place] RCA ~90%, other anatomy with mild non-obstructive disease\"    History of transesophageal echocardiography (CARY) 05/05/2022    EF 15-20%, left ventrical severely dilated, AV mod-severe eccentric regurgitation, MVR mild to moderate, RVSP 41 mmHg    Hyperlipidemia     Hypertension     medication    ILD (interstitial lung disease) ([address]     Ofev    Murmur cardiac note 22: No definite PND/ orthopnea. Improved sx since initial eval on 22, down 15-20 pounds, states improved ROOT, persistent lower ext edema, states can walk over a flight of stairs. No chest discomfort. Denies dizziness. Osteoarthritis of knees, bilateral     Vitamin D deficiency       Past Surgical History:   Procedure Laterality Date    COLONOSCOPY  2019    Dr. Nadia Yanes, due for repeat in 5 years    HERNIA REPAIR Left 2022    INCARCERATED LEFT INGUINAL HERNIA REPAIR W/ MESH performed by Jay Alcala MD at [facility] MAIN OR    ORTHOPEDIC SURGERY Left     left wrist, cyst        Family History   Problem Relation Age of Onset    Breast Cancer Sister     Heart Disease Father     Coronary Art Dis Neg Hx       Social History     Tobacco Use    Smoking status: Former     Packs/day: 1.00     Years: 20.00     Pack years: 20.00     Types: Cigarettes     Quit date: 2014     Years since quittin.5    Smokeless tobacco: Never   Substance Use Topics    Alcohol use: Yes     Comment: rare      Allergies   Allergen Reactions    Penicillin G Shortness Of Breath         Review of Systems   Constitutional: Negative. Negative for chills and fever. Eyes: Negative. Cardiovascular:  Positive for leg swelling. Negative for chest pain and irregular heartbeat. Respiratory:  Negative for cough, shortness of breath and wheezing. Gastrointestinal: Negative. Negative for abdominal pain, nausea and vomiting. Neurological:  Positive for focal weakness (right hand as per HPI). Negative for dizziness and weakness. Psychiatric/Behavioral: Negative. All other systems reviewed and are negative. Objective:       BP 97/69   Pulse 87   Temp 97.5 °F (36.4 °C)   Resp 18   Ht 5' 8\" (1.727 m)   Wt 228 lb (103.4 kg)   SpO2 100%   BMI 34.67 kg/m²     No intake/output data recorded.  1901 -  0700  In: 2650 [P.O.:2650]  Out: -     Physical Exam  Vitals reviewed.    Constitutional: General: He is not in acute distress. Appearance: He is not toxic-appearing or diaphoretic. HENT:      Head: Normocephalic and atraumatic. Right Ear: External ear normal.      Left Ear: External ear normal.      Nose: Nose normal.   Eyes:      General: No scleral icterus. Right eye: No discharge. Left eye: No discharge. Cardiovascular:      Rate and Rhythm: Normal rate and regular rhythm. Heart sounds: Murmur (2/6 systolic murmur LLSB) heard. No friction rub. No gallop. Pulmonary:      Effort: Pulmonary effort is normal. No accessory muscle usage or respiratory distress. Breath sounds: Normal breath sounds. No stridor. No wheezing or rales. Abdominal:      General: Abdomen is flat. There is no distension. Palpations: Abdomen is soft. Tenderness: There is no abdominal tenderness. Musculoskeletal:         General: Normal range of motion. Right lower leg: Edema (chronic appearing) present. Left lower leg: Edema (chronic appearing) present. Comments: Clubbing fingers bilaterally. Skin:     General: Skin is warm and dry. Neurological:      Mental Status: He is alert and oriented to person, place, and time. Psychiatric:         Mood and Affect: Mood normal.         Thought Content: Thought content normal.         Judgment: Judgment normal.       Data Review:   Recent Labs     11/19/22  0934 11/19/22  2313 11/20/22  0441     --  137   K 3.7 3.7 3.9   MG 1.9 1.6*  --    BUN 20  --  20   WBC 6.6  --  6.4   HGB 15.5  --  14.1   HCT 48.2  --  42.3     --  181   INR 1.4  --   --    CHOL  --   --  113   HDL  --   --  34*     Echocardiogram 11/19/22:  Left Ventricle Severely reduced left ventricular systolic function with a visually estimated EF of 25 - 30%. Left ventricle is severely dilated. Mildly increased wall thickness. Severe global hypokinesis present. Abnormal diastolic function. Left Atrium Left atrium is severely dilated. Right Ventricle Right ventricle size is normal. Normal systolic function. Right Atrium Right atrium size is normal.   Aortic Valve Valve structure is normal. Mild regurgitation. No stenosis. Mitral Valve Valve structure is normal. Moderate regurgitation. No stenosis noted. Tricuspid Valve Valve structure is normal. Moderate regurgitation. No stenosis noted. Moderately elevated RVSP. The estimated RVSP is 43 mmHg. Pulmonic Valve Valve structure is normal. Mild regurgitation. No stenosis noted. Aorta Mildly dilated aortic root. Pericardium No pericardial effusion. Assessment/Plan:     TIA (transient ischemic attack)  - neuro requested our recommendations re NOAC vs DAPT, MRI brain negative for acute infarct. - ASA, plavix,Atorvastatin 80 per neurology. - echocardiogram as above without severely reduced LVEF. - no LV thrombus visualized on echocardiogram.  - available telemetry without sustained arrhythmias, brief episode of wide complex tachycardiac which was not sustained. - at this time no strong indication for systemic anticoagulation with DOAC or Warfarin. Would have the patient report to our office this week for 30 day telemetry monitor placement. - continue risk factor modification     CAD  - severe dRCA disease, no chest pain at this time   - continue risk factor modification   - ASA, statin, BB  - ARB held RE: hypotension     Cardiomyopathy w AR  - bumex, Toprol  - reasonable to hold ARB at this time due to lower BP  - hemodynamics stable, tolerating supine positioning without distress   - chronic appearing leg edema     HLD  - continue Atorvastatin 80    ILD  - per primary team     Reasonable to consider discharge from cardiology perspective. Please have the patient report to our office for 30 day telemetry placement and keep follow up with Dr Lizzy Fleming on 12/6/22. Discussed this plan with the patient and his family today at bedside.

## 2022-11-21 NOTE — PLAN OF CARE
Problem: Discharge Planning  Goal: Discharge to home or other facility with appropriate resources  11/21/2022 0748 by Lenard Zamora RN  Outcome: Progressing  11/21/2022 0232 by Chad Paige RN  Outcome: Progressing     Problem: Safety - Adult  Goal: Free from fall injury  11/21/2022 0748 by Lenard Zamora RN  Outcome: Progressing  11/21/2022 0232 by Chad Paige RN  Outcome: Progressing     Problem: ABCDS Injury Assessment  Goal: Absence of physical injury  11/21/2022 0748 by Lenard Zamora RN  Outcome: Progressing  11/21/2022 0232 by Chad Paige RN  Outcome: Progressing     Problem: Pain  Goal: Verbalizes/displays adequate comfort level or baseline comfort level  11/21/2022 0748 by Lenard Zamora RN  Outcome: Progressing  11/21/2022 0232 by Chad Paige RN  Outcome: Progressing

## 2022-11-21 NOTE — PLAN OF CARE
Problem: Discharge Planning  Goal: Discharge to home or other facility with appropriate resources  11/21/2022 1728 by Hubert العراقي RN  Outcome: Adequate for Discharge  11/21/2022 0748 by Hubert العراقي RN  Outcome: Progressing     Problem: Safety - Adult  Goal: Free from fall injury  11/21/2022 1728 by Hubert العراقي RN  Outcome: Adequate for Discharge  11/21/2022 0748 by Hubert العراقي RN  Outcome: Progressing     Problem: ABCDS Injury Assessment  Goal: Absence of physical injury  11/21/2022 1728 by Hubert العراقي RN  Outcome: Adequate for Discharge  11/21/2022 0748 by Hubert العراقي RN  Outcome: Progressing     Problem: Pain  Goal: Verbalizes/displays adequate comfort level or baseline comfort level  11/21/2022 1728 by Hubert العراقي RN  Outcome: Adequate for Discharge  11/21/2022 0748 by Hubert العراقي RN  Outcome: Progressing     Problem: Chronic Conditions and Co-morbidities  Goal: Patient's chronic conditions and co-morbidity symptoms are monitored and maintained or improved  Outcome: Adequate for Discharge

## 2022-11-21 NOTE — PROGRESS NOTES
Physician Progress Note      PATIENT:               Phuc Dumont  [facility] #:                  [id number]  :                       1955  ADMIT DATE:       2022 9:19 AM  DISCH DATE:  RESPONDING  PROVIDER #:        Radha Nj MD          QUERY TEXT:    Pt admitted with TIA  and has CHF documented. If possible, please document in   progress notes and discharge summary further specificity regarding the type   and acuity of CHF:    The medical record reflects the following:  Risk Factors: hx chf, htn  Clinical Indicators: ECHO with EF of 25 to 30 percent. Documented in medical   record  hx chf.  Documented lungs CTAB. Extremities. No edema. Treatment: Bumex        [email or web address]  Options provided:  -- Chronic Systolic CHF/HFrEF  -- Chronic diastolic CHF/HFpEF  -- Other - I will add my own diagnosis  -- Disagree - Not applicable / Not valid  -- Disagree - Clinically unable to determine / Unknown  -- Refer to Clinical Documentation Reviewer    PROVIDER RESPONSE TEXT:    This patient has chronic systolic CHF/HFrEF.     Query created by: Lizzie Arreaga on 2022 1:32 PM      Electronically signed by:  Radha Nj MD 2022 1:37 PM

## 2022-11-21 NOTE — DISCHARGE SUMMARY
Hospitalist Discharge Summary   Admit Date:  2022  9:19 AM   DC Note date: 2022  Name:  [facility]   Age:  73 y.o. Sex:  male  :  1955   MRN:  [id number]   Room:  [facility]/  PCP:  Loulou Epperson MD    Presenting Complaint: Hand Numbness     Initial Admission Diagnosis: TIA (transient ischemic attack) [G45.9]  Right hand weakness [R29.898]     Problem List for this Hospitalization (present on admission):    Principal Problem:    TIA (transient ischemic attack)  Active Problems: Aortic regurgitation    CAD (coronary artery disease)    Benign essential hypertension    ILD (interstitial lung disease) ([facility]    Dilated cardiomyopathy ([facility] [address]  Resolved Problems:    * No resolved hospital problems. [facility] Course: Blas Melendrez is a 73 y.o. male with medical history of AR, cardiomyopathy, CHF, hyperlipidemia, hypertension, ILD, osteoarthritis of knees and past surgical history of hernia repair presented with right-sided weakness of the hand at 4 AM today. He was unable to grab the cup with his right hand and pick it up. He felt there was no  which lasted for 1 hour. It is associate with a tingling and numbness. After 1 hour weakness improved. He lives alone and ambulates independently. This is his first episode never felt like this before. Family history of heart attack of his father at the age of late 62s. He quit smoking in . Patient denies chest pain, shortness of breath, headache, nausea, vomiting, diarrhea, dysphagia, dysphonia, slurring of the speech. TIA: patient was evaluated by Cardiology and Neurology as his Chronic HFrEF complicated his anticoagulation therapy. The plan is for the patient to be discharge on DAPT and a HI statin. He will also  a 30 day event monitor as an outpatient per cardiology. He will follow up with Cardiology as an outpatient.     Obesity:  complicates care, lifestyle modifications encouraged      Disposition: Home  Diet: ADULT DIET; Regular; Low Fat/Low Chol/High Fiber/MARIELENA  Code Status: Full Code    Follow Ups:      Time spent in patient discharge and coordination 31 minutes. Follow up labs/diagnostics (ultimately defer to outpatient provider):  None    Plan was discussed with patient. All questions answered. Patient was stable at time of discharge. Instructions given to call a physician or return if any concerns.     Current Discharge Medication List        START taking these medications    Details   aspirin 81 MG EC tablet Take 1 tablet by mouth daily  Qty: 30 tablet, Refills: 3      clopidogrel (PLAVIX) 75 MG tablet Take 1 tablet by mouth daily for 18 doses  Qty: 30 tablet, Refills: 3           CONTINUE these medications which have CHANGED    Details   atorvastatin (LIPITOR) 80 MG tablet Take 1 tablet by mouth nightly  Qty: 30 tablet, Refills: 3           CONTINUE these medications which have NOT CHANGED    Details   Budeson-Glycopyrrol-Formoterol (BREZTRI AEROSPHERE) 160-9-4.8 MCG/ACT AERO Inhale 2 puffs into the lungs 2 times daily  Qty: 1 each, Refills: 11      metoprolol succinate (TOPROL XL) 25 MG extended release tablet Take 1 tablet by mouth daily  Qty: 30 tablet, Refills: 3      bumetanide (BUMEX) 1 MG tablet Take 1 mg by mouth 2 times daily      Nintedanib Esylate (OFEV) 150 MG CAPS TAKE 1 CAPSULE BY MOUTH TWICE A DAY 12 HOURS APART WITH FOOD      spironolactone (ALDACTONE) 25 MG tablet Take 25 mg by mouth daily           STOP taking these medications       valsartan (DIOVAN) 160 MG tablet Comments:   Reason for Stopping:               Procedures done this admission:  * No surgery found *    Consults this admission:  IP CONSULT TO CARDIOLOGY    Echocardiogram results:  11/19/22    TRANSTHORACIC ECHOCARDIOGRAM (TTE) COMPLETE (CONTRAST/BUBBLE/3D PRN) 11/21/2022 11:23 AM (Final)    Interpretation Summary    Left Ventricle: Severely reduced left ventricular systolic function with a visually estimated EF of 25 - 30%. Left ventricle is severely dilated. Mildly increased wall thickness. Severe global hypokinesis present. Abnormal diastolic function. Aortic Valve: Mild regurgitation. Mitral Valve: Moderate regurgitation. Tricuspid Valve: Moderate regurgitation. Moderately elevated RVSP. The estimated RVSP is 43 mmHg. Left Atrium: Left atrium is severely dilated. Aorta: Mildly dilated aortic root. Contrast used: Definity. Signed by: Guero Galeano MD on 11/21/2022 11:23 AM      Diagnostic Imaging/Tests:   CTA HEAD NECK W WO CONTRAST    Result Date: 11/19/2022  1. Diffuse atherosclerotic changes noted within the neck and [place] with more advanced changes noted of the right carotid bulb and proximal right internal carotid artery as described above. 2. Chronic lung changes with incompletely evaluated right pleural effusion and probable pulmonary edema, congestive heart failure likely. CT HEAD WO CONTRAST    Result Date: 11/19/2022  1. Findings most compatible with mild chronic small vessel ischemic changes. 2. Otherwise unremarkable unenhanced CT scan of the brain. MRI brain without contrast    Result Date: 11/19/2022  1. No evidence of acute infarction. 2.  Findings most compatible with mild chronic small vessel ischemic change.         Labs: Results:       BMP, Mg, Phos Recent Labs     11/19/22  0934 11/19/22  2313 11/20/22  0441     --  137   K 3.7 3.7 3.9     --  106   CO2 27  --  24   ANIONGAP 7  --  7   BUN 20  --  20   CREATININE 1.66*  --  1.36   LABGLOM 45*  --  58*   CALCIUM 10.1  --  9.2   GLUCOSE 103*  --  97   MG 1.9 1.6*  --    PHOS  --   --  2.8      CBC Recent Labs     11/19/22  0934 11/20/22  0441   WBC 6.6 6.4   RBC 4.70 4.22*   HGB 15.5 14.1   HCT 48.2 42.3   .6* 100.2   MCH 33.0* 33.4*   MCHC 32.2 33.3   RDW 14.6 14.7*    181   MPV 9.2* 9.2*   NRBC 0.00 0.00   SEGS  --  62   LYMPHOPCT  --  26   EOSRELPCT  --  1 MONOPCT  --  10   BASOPCT  --  1   IMMGRAN  --  0   SEGSABS  --  4.0   LYMPHSABS  --  1.7   EOSABS  --  0.1   MONOSABS  --  0.6   BASOSABS  --  0.1   ABSIMMGRAN  --  0.0      LFT Recent Labs     11/19/22  0934   BILITOT 2.6*   ALKPHOS 120   AST 24   ALT 30   PROT 7.5   LABALBU 3.5   GLOB 4.0      Cardiac  Lab Results   Component Value Date/Time    NTPROBNP 10,039 09/14/2022 05:33 PM      Coags Lab Results   Component Value Date/Time    PROTIME 17.2 11/19/2022 09:34 AM    INR 1.4 11/19/2022 09:34 AM      A1c Lab Results   Component Value Date/Time    LABA1C 5.9 11/20/2022 04:41 AM    LABA1C 5.6 09/30/2019 08:29 AM     11/20/2022 04:41 AM     09/30/2019 08:29 AM      Lipids Lab Results   Component Value Date/Time    CHOL 113 11/20/2022 04:41 AM    LDLCALC 67.6 11/20/2022 04:41 AM    LABVLDL 11.4 11/20/2022 04:41 AM    HDL 34 11/20/2022 04:41 AM    CHOLHDLRATIO 3.3 11/20/2022 04:41 AM    TRIG 57 11/20/2022 04:41 AM      Thyroid  Lab Results   Component Value Date/Time    BWL6UWM 1.020 11/02/2022 08:52 AM        Most Recent UA Lab Results   Component Value Date/Time    COLORU ISIAH 09/07/2021 11:24 AM    SPECGRAV 1.023 09/07/2021 11:24 AM    PROTEINU TRACE 09/07/2021 11:24 AM    GLUCOSEU Negative 09/07/2021 11:24 AM    GLUCOSEU Negative 04/21/2021 08:58 AM    KETUA TRACE 09/07/2021 11:24 AM    BILIRUBINUR SMALL 09/07/2021 11:24 AM    BLOODU Negative 09/07/2021 11:24 AM    UROBILINOGEN 1.0 04/21/2021 08:58 AM    NITRU Negative 09/07/2021 11:24 AM    LEUKOCYTESUR Negative 09/07/2021 11:24 AM    WBCUA 0-3 09/07/2021 11:24 AM    RBCUA 3-5 09/07/2021 11:24 AM    BACTERIA 0 09/07/2021 11:24 AM        No results for input(s): CULTURE in the last 720 hours.     All Labs from Last 24 Hrs:  Recent Results (from the past 24 hour(s))   Transthoracic echocardiogram (TTE) complete with contrast, bubble, strain, and 3D PRN    Collection Time: 11/21/22  9:25 AM   Result Value Ref Range    Body Surface Area 2.23 m2 Fractional Shortening 2D 11 28 - 44 %    LV ESV Index A4C 79 mL/m2    LV EDV Index A4C 109 mL/m2    LV ESV Index A2C 85 mL/m2    LV EDV Index A2C 113 mL/m2    LVIDd Index 2.82 cm/m2    LVIDs Index 2.50 cm/m2    LV RWT Ratio 0.23     LV Mass 2D 162.8 88 - 224 g    LV Mass 2D Index 75.3 49 - 115 g/m2    LA Volume Index BP 54 (A) 16 - 34 ml/m2    LVOT Stroke Volume Index 37.3 mL/m2    LA Volume Index 2C 65 (A) 16 - 34 mL/m2    LA Volume Index 4C 44 (A) 16 - 34 mL/m2    Ao Root Index 1.71 cm/m2    Ascending Aorta Index 1.62 cm/m2    AV Velocity Ratio 0.44     LVOT:AV VTI Index 0.45     LARRY/BSA VTI 0.8 cm2/m2    LARRY/BSA Peak Velocity 0.8 cm2/m2    MV:LVOT VTI Index 1.75     Est. RA Pressure 15 mmHg    RVSP 43 mmHg    LV EDV A2C 245 mL    LV EDV A4C 235 mL    LV ESV A2C 184 mL    LV ESV A4C 170 mL    IVSd 0.7 0.6 - 1.0 cm    LVIDd 6.1 (A) 4.2 - 5.9 cm    LVIDs 5.4 cm    LVOT Diameter 2.2 cm    LVOT Mean Gradient 3 mmHg    LVOT VTI 21.2 cm    LVOT Peak Velocity 1.1 m/s    LVOT Peak Gradient 5 mmHg    LVPWd 0.7 0.6 - 1.0 cm    LV E' Lateral Velocity 13 cm/s    LV Ejection Fraction A2C 25 %    LV Ejection Fraction A4C 28 %    EF BP 26 (A) 55 - 100 %    LVOT Area 3.8 cm2    LVOT SV 80.5 ml    LA Minor Axis 7.6 cm    LA Major Axis 7.9 cm    LA Area 2C 35.6 cm2    LA Area 4C 30.1 cm2    LA Volume 2C 141 (A) 18 - 58 mL    LA Volume 4C 94 (A) 18 - 58 mL    LA Volume  (A) 18 - 58 mL    AV Mean Gradient 16 mmHg    AV VTI 46.8 cm    AV Mean Velocity 1.9 m/s    AV Peak Velocity 2.5 m/s    AV Peak Gradient 25 mmHg    AV Area by VTI 1.7 cm2    AV Area by Peak Velocity 1.7 cm2    Aortic Root 3.7 cm    Ascending Aorta 3.5 cm    IVC Expiration 2.5 cm    MR .0 cm    MV Mean Gradient 2 mmHg    MV VTI 37.1 cm    MV Mean Velocity 0.7 m/s    MR Peak Velocity 4.7 m/s    MR Peak Gradient 88 mmHg    MV Max Velocity 1.4 m/s    MV Peak Gradient 8 mmHg    MV Area by VTI 2.2 cm2    PV AT 63.0 ms    PV Max Velocity 0.8 m/s    PV Peak Gradient 3 mmHg    RV Basal Dimension 4.3 cm    RV Longitudinal Dimension 7.4 cm    RV Mid Dimension 3.3 cm    RV Free Wall Peak S' 10 cm/s    TAPSE 1.6 (A) 1.7 cm    TR Max Velocity 2.63 m/s    TR Peak Gradient 28 mmHg       Allergies   Allergen Reactions    Penicillin G Shortness Of Breath     Immunization History   Administered Date(s) Administered    COVID-19, MODERNA GLENDA border, Primary or Immunocompromised, (age 12y+), IM, 100 mcg/0.5mL 07/01/2021, 08/01/2021    COVID-19, MODERNA Booster BLUE border, (age 18y+), IM, 50mcg/0.25mL 01/01/2022    DTaP (Infanrix) 11/09/2009    Influenza Quadv 12/11/2014    Influenza Trivalent 10/10/2011, 11/08/2012    Influenza, FLUARIX, FLULAVAL, [address] (age 10 mo+) AND AFLURIA, (age 1 y+), PF, 0.5mL 11/22/2013, 12/28/2015, 09/28/2018, 10/11/2019, 10/14/2020    Influenza, High Dose (Fluzone 65 yrs and older) 10/01/2021    PPD Test 09/07/2016    Tdap (Boostrix, Adacel) 01/30/2020       Recent Vital Data:  Patient Vitals for the past 24 hrs:   Temp Pulse Resp BP SpO2   11/21/22 1455 97.5 °F (36.4 °C) 87 -- 97/69 100 %   11/21/22 1145 97.7 °F (36.5 °C) 90 -- 99/67 100 %   11/21/22 0436 98.4 °F (36.9 °C) 91 18 97/65 93 %   11/20/22 2354 98.4 °F (36.9 °C) 99 22 94/61 93 %   11/20/22 2120 -- 94 18 -- 95 %   11/20/22 1928 97.9 °F (36.6 °C) 91 20 100/63 94 %   11/20/22 1638 97.2 °F (36.2 °C) 94 17 98/71 95 %       Oxygen Therapy  SpO2: 100 %  Pulse Oximeter Device Mode: Intermittent  Pulse Oximeter Device Location: Right, Finger  O2 Device: None (Room air)  O2 Flow Rate (L/min): 0 L/min    Estimated body mass index is 34.67 kg/m² as calculated from the following:    Height as of this encounter: 5' 8\" (1.727 m). Weight as of this encounter: 228 lb (103.4 kg). Intake/Output Summary (Last 24 hours) at 11/21/2022 1622  Last data filed at 11/21/2022 0546  Gross per 24 hour   Intake 1200 ml   Output --   Net 1200 ml         Physical Exam:    General:    Well nourished.   No overt distress  Head:  Normocephalic, atraumatic  Eyes:  Sclerae appear normal.  Pupils equally round. HENT:  Nares appear normal, no drainage. Moist mucous membranes  Neck:  No restricted ROM. Trachea midline  CV:   RRR. No m/r/g. No JVD  Lungs:   CTAB. No wheezing, rhonchi, or rales. Respirations even, unlabored  Abdomen:   Soft, nontender, nondistended. Extremities: Warm and dry. No cyanosis or clubbing. No edema. Skin:     No rashes. Normal coloration  Neuro:  CN II-XII grossly intact. Psych:  Normal mood and affect. Signed:  Tanner Santiago MD    Part of this note may have been written by using a voice dictation software. The note has been proof read but may still contain some grammatical/other typographical errors.

## 2022-11-22 ENCOUNTER — TELEPHONE (OUTPATIENT)
Dept: INTERNAL MEDICINE CLINIC | Facility: CLINIC | Age: 67
End: 2022-11-22

## 2022-11-22 ENCOUNTER — TELEPHONE (OUTPATIENT)
Dept: NEUROLOGY | Age: 67
End: 2022-11-22

## 2022-11-22 NOTE — TELEPHONE ENCOUNTER
Talked to pt and he stated he sees no reason to follow up w/ Dr. Soto Salamanca. He is on a heart monitor for 30 days.

## 2022-11-22 NOTE — CARE COORDINATION
11/22/22 0841   Service Assessment   Support Systems Family Members   Prior Functional Level Independent in ADLs/IADLs   Current Functional Level Independent in ADLs/IADLs   Can patient return to prior living arrangement Yes   Ability to make needs known: Good   Family able to assist with home care needs: Yes   Financial Resources Medicare   Social/Functional History   Lives With Alone   Type of [address] Two level;Bed/Bath upstairs   Home Access Level entry   Bathroom Shower/Tub Tub/Shower unit   ADL Assistance Independent   Ambulation Assistance Independent   Active  Yes   Occupation Full time employment   Discharge [facility] Prior To Admission None   Potential Assistance Needed N/A   DME Ordered? No   Potential Assistance Purchasing Medications No   Type of Home Care Services None   Patient expects to be discharged to[facility]   Services At/After Discharge None     The patient discharged home. No case management needs were identified.

## 2022-11-22 NOTE — TELEPHONE ENCOUNTER
Care Transitions Initial Follow Up Call    Outreach made within 2 business days of discharge: Yes    Patient: Sofia Barlow Patient : 1955   MRN: [id number]  Reason for Admission: TIA  Discharge Date: 22       Spoke with: Judith Murray    Discharge department/facility: [address] [place] [address] Interactive Patient Contact:  Was patient able to fill all prescriptions: Yes  Was patient instructed to bring all medications to the follow-up visit: No:   Is patient taking all medications as directed in the discharge summary?  Yes  Does patient understand their discharge instructions: Yes  Does patient have questions or concerns that need addressed prior to 7-14 day follow up office visit: no    Scheduled appointment with PCP within 7-14 days    Follow Up  Future Appointments   Date Time Provider Julia Morrison   2022 10:40 AM MAYITO Estrada BSND [facility] AMB   1/3/2023  7:50 AM FELIPE Goddard PPS [facility] AMB   2023 11:15 AM Edward Hodgson MD UCDG [facility] AMB   5/10/2023  8:00 AM Mat Ivory MD MAT [facility] AMB       Pushpa Adame [facility]

## 2022-11-28 ENCOUNTER — TELEPHONE (OUTPATIENT)
Dept: CARDIOLOGY CLINIC | Age: 67
End: 2022-11-28

## 2022-11-28 NOTE — TELEPHONE ENCOUNTER
Received FAX'd [employer] report from 11/23/22 at 4:13 am CST, showing sinus rhythm with run of V-tach (4 beats), multifocal PVCs (11 in 1 minute) and HR-100-142. Per medical record, patient was hospitalized 11/19/22-11/21/22 with hand numbness and TIA. Patient is scheduled to see Dr. Lucio Aviles on 1/5/23.

## 2022-11-28 NOTE — TELEPHONE ENCOUNTER
MD Roberta Jolly, RN  Caller: Unspecified (Today, 10:18 AM)  Mostly PVCs and 1 short run of multifocal PVCs. Appears device is placed for possible AF. No changes at this time. Known severe left ventricular dysfunction which is being worked up.

## 2022-12-12 ENCOUNTER — OFFICE VISIT (OUTPATIENT)
Dept: NEUROLOGY | Age: 67
End: 2022-12-12
Payer: COMMERCIAL

## 2022-12-12 ENCOUNTER — HOSPITAL ENCOUNTER (OUTPATIENT)
Dept: ULTRASOUND IMAGING | Age: 67
Discharge: HOME | End: 2022-12-15
Payer: COMMERCIAL

## 2022-12-12 VITALS
SYSTOLIC BLOOD PRESSURE: 95 MMHG | WEIGHT: 240 LBS | DIASTOLIC BLOOD PRESSURE: 66 MMHG | OXYGEN SATURATION: 98 % | BODY MASS INDEX: 36.37 KG/M2 | HEIGHT: 68 IN

## 2022-12-12 DIAGNOSIS — I42.0 DILATED CARDIOMYOPATHY (HCC): ICD-10-CM

## 2022-12-12 DIAGNOSIS — J84.9 ILD (INTERSTITIAL LUNG DISEASE) (HCC): ICD-10-CM

## 2022-12-12 DIAGNOSIS — E05.90 HYPERTHYROIDISM: ICD-10-CM

## 2022-12-12 DIAGNOSIS — R68.3 CLUBBING OF FINGERS: ICD-10-CM

## 2022-12-12 DIAGNOSIS — Z87.891 HISTORY OF SMOKING 25-50 PACK YEARS: ICD-10-CM

## 2022-12-12 DIAGNOSIS — G45.9 TIA (TRANSIENT ISCHEMIC ATTACK): ICD-10-CM

## 2022-12-12 DIAGNOSIS — R60.9 PERIPHERAL EDEMA: ICD-10-CM

## 2022-12-12 DIAGNOSIS — Z09 HOSPITAL DISCHARGE FOLLOW-UP: Primary | ICD-10-CM

## 2022-12-12 PROCEDURE — 1111F DSCHRG MED/CURRENT MED MERGE: CPT | Performed by: NURSE PRACTITIONER

## 2022-12-12 PROCEDURE — 1036F TOBACCO NON-USER: CPT | Performed by: NURSE PRACTITIONER

## 2022-12-12 PROCEDURE — 76536 US EXAM OF HEAD AND NECK: CPT

## 2022-12-12 PROCEDURE — 3074F SYST BP LT 130 MM HG: CPT | Performed by: NURSE PRACTITIONER

## 2022-12-12 PROCEDURE — G8417 CALC BMI ABV UP PARAM F/U: HCPCS | Performed by: NURSE PRACTITIONER

## 2022-12-12 PROCEDURE — 1123F ACP DISCUSS/DSCN MKR DOCD: CPT | Performed by: NURSE PRACTITIONER

## 2022-12-12 PROCEDURE — 3017F COLORECTAL CA SCREEN DOC REV: CPT | Performed by: NURSE PRACTITIONER

## 2022-12-12 PROCEDURE — 3078F DIAST BP <80 MM HG: CPT | Performed by: NURSE PRACTITIONER

## 2022-12-12 PROCEDURE — 99215 OFFICE O/P EST HI 40 MIN: CPT | Performed by: NURSE PRACTITIONER

## 2022-12-12 PROCEDURE — G8427 DOCREV CUR MEDS BY ELIG CLIN: HCPCS | Performed by: NURSE PRACTITIONER

## 2022-12-12 PROCEDURE — G8484 FLU IMMUNIZE NO ADMIN: HCPCS | Performed by: NURSE PRACTITIONER

## 2022-12-12 ASSESSMENT — PATIENT HEALTH QUESTIONNAIRE - PHQ9
SUM OF ALL RESPONSES TO PHQ QUESTIONS 1-9: 0
1. LITTLE INTEREST OR PLEASURE IN DOING THINGS: 0
SUM OF ALL RESPONSES TO PHQ QUESTIONS 1-9: 0
SUM OF ALL RESPONSES TO PHQ9 QUESTIONS 1 & 2: 0
2. FEELING DOWN, DEPRESSED OR HOPELESS: 0
SUM OF ALL RESPONSES TO PHQ QUESTIONS 1-9: 0
SUM OF ALL RESPONSES TO PHQ QUESTIONS 1-9: 0

## 2022-12-12 NOTE — PROGRESS NOTES
[facility]  [address] [facility]  [address] [facility], [address] [facility]      Chief Complaint   Patient presents with    Follow-Up from Hospital    Transient Ischemic Attack       Saige Snowden is a 73 y.o. male who presents for hospital follow up for TIA. PMH significant for severe cardiomyopathy with EF < 20 %, aortic regurgitation, CHF, -HLD, HTN, ILD who presented to ED with right UE weakness, diffculty with fine motor movements and sensory changes that lasted 45 minutes in duration. Code S on arrival. NIH 0. CT of head negative for acute intracranial abnormalities. CTA of head/neck demonstrated diffuse ICAD, negative for LVO or high grade stenosis. MRI of brain negative for acute infarct. TTE EF 25-30% with global hypokinesis,severe dilation of LA,  negative for PFO. He was evaluated by PT/OT/ST. He was discharged home on DAPT with ASA 81mg daily, clopidogrel 75 mg daily and atrovastatin 80 mg daily for secondary stroke prevention. Interval history:  He is here today with his spouse. Endorses SOB with exertion and bilateral lower extremity edema, chronic. He is currently in wheelchair due to not being ambulate for long distances,due to dyspnea, he uses cane at home. He is currently wearing a cardiac event monitor, and has upcoming appointment with Cardiology in Jan. 2023. He is currently taking ASA 81 mg daily and atorvastatin 80 mg daily for secondary stroke prevention. Denies falls,  GI distress, myalgias or bleeding complications. He denies feelings of depression or SI. He is sedentary. He is currently on low sodium/low sat. Fat/low cholesterol diet. Former tobacco use- quit smoking in 2014.    Past Medical History:   Diagnosis Date    Aortic regurgitation     mod-severe eccentric-  ECHO 5/5/22; per MD note 8/8/22 not controlled    Bronchiectasis ([address]     Cardiomyopathy ([address]     CHF (congestive heart failure) ([address]     ED (erectile dysfunction)     Edema     H/O coronary angiogram 2022    cath report: mild LM disease, severe MV, severe LV dysfunction, increased LVEDP. MD note 22: Sinda Precise RCA ~90%, other anatomy with mild non-obstructive disease\"    History of transesophageal echocardiography (CARY) 2022    EF 15-20%, left ventrical severely dilated, AV mod-severe eccentric regurgitation, MVR mild to moderate, RVSP 41 mmHg    Hyperlipidemia     Hypertension     medication    ILD (interstitial lung disease) ([address]     Ofev    Murmur     cardiac note 22: No definite PND/ orthopnea. Improved sx since initial eval on 22, down 15-20 pounds, states improved ROOT, persistent lower ext edema, states can walk over a flight of stairs. No chest discomfort. Denies dizziness.     Osteoarthritis of knees, bilateral     Vitamin D deficiency        Past Surgical History:   Procedure Laterality Date    COLONOSCOPY  2019    Dr. Destiny Trinh, due for repeat in 5 years    HERNIA REPAIR Left 2022    INCARCERATED LEFT INGUINAL HERNIA REPAIR W/ MESH performed by Levi Lord MD at [facility] MAIN OR    ORTHOPEDIC SURGERY Left     left wrist, cyst       Family History   Problem Relation Age of Onset    Breast Cancer Sister     Heart Disease Father     Coronary Art Dis Neg Hx        Social History     Socioeconomic History    Marital status: Legally      Spouse name: None    Number of children: None    Years of education: None    Highest education level: None   Tobacco Use    Smoking status: Former     Packs/day: 1.00     Years: 20.00     Pack years: 20.00     Types: Cigarettes     Quit date: 2014     Years since quittin.5    Smokeless tobacco: Never   Vaping Use    Vaping Use: Never used   Substance and Sexual Activity    Alcohol use: Yes     Comment: rare    Drug use: No    Sexual activity: Not Currently         Current Outpatient Medications:     aspirin 81 MG EC tablet, Take 1 tablet by mouth daily, Disp: 30 tablet, Rfl: 3    atorvastatin (LIPITOR) 80 MG tablet, Take 1 tablet by mouth nightly, Disp: 30 tablet, Rfl: 3    Budeson-Glycopyrrol-Formoterol (BREZTRI AEROSPHERE) 160-9-4.8 MCG/ACT AERO, Inhale 2 puffs into the lungs 2 times daily, Disp: 1 each, Rfl: 11    metoprolol succinate (TOPROL XL) 25 MG extended release tablet, Take 1 tablet by mouth daily, Disp: 30 tablet, Rfl: 3    bumetanide (BUMEX) 1 MG tablet, Take 1 mg by mouth 2 times daily, Disp: , Rfl:     Nintedanib Esylate (OFEV) 150 MG CAPS, TAKE 1 CAPSULE BY MOUTH TWICE A DAY 12 HOURS APART WITH FOOD, Disp: , Rfl:     spironolactone (ALDACTONE) 25 MG tablet, Take 25 mg by mouth daily, Disp: , Rfl:     clopidogrel (PLAVIX) 75 MG tablet, Take 1 tablet by mouth daily for 18 doses, Disp: 30 tablet, Rfl: 3    Allergies   Allergen Reactions    Penicillin G Shortness Of Breath       REVIEW OF SYSTEMS:  CONSTITUTIONAL: No weight loss, fever, chills, weakness or fatigue. HEENT: Eyes: No visual loss, blurred vision, double vision or yellow sclerae. Ears, Nose, Throat: No hearing loss, sneezing, congestion, runny nose or sore throat. SKIN: No rash or itching. CARDIOVASCULAR: No chest pain, chest pressure or chest discomfort. No palpitations or edema. RESPIRATORY: No shortness of breath, cough or sputum. GASTROINTESTINAL: No anorexia, nausea, vomiting or diarrhea. No abdominal pain or blood. GENITOURINARY: no burning with urination. NEUROLOGICAL: No headache, dizziness, syncope, paralysis, ataxia, numbness or tingling in the extremities. No change in bowel or bladder control. MUSCULOSKELETAL: No muscle, back pain, joint pain or stiffness. HEMATOLOGIC: No anemia, bleeding or bruising. LYMPHATICS: No enlarged nodes. No history of splenectomy. PSYCHIATRIC: No history of depression or anxiety. ENDOCRINOLOGIC: No reports of sweating, cold or heat intolerance. No polyuria or polydipsia. ALLERGIES: No history of asthma, hives, eczema or rhinitis.     Physical Examination  BP 95/66 (Site: Left Upper Arm, Position: Sitting)   Ht 5' 8\" (1.727 m)   Wt 240 lb (108.9 kg)   SpO2 98%   BMI 36.49 kg/m²     General - Well developed, well nourished, in no apparent distress. Pleasant and conversent. HEENT - Normocephalic, atraumatic. Conjunctiva, tympanic membranes, and oropharynx are clear. Neck - Supple without masses, no bruits   Cardiovascular - Regular rate and rhythm. Normal S1, S2   Lungs - diminished bilateral lung bases. Abdomen - Soft, nontender with normal bowel sounds. Extremities - clubbed fingers. Peripheral pulses intact. 3+ pitting edema in BLE and no rashes. Neurological examination - Comprehension, attention , memory and reasoning are intact. Language and speech are normal. On cranial nerve examination pupils are equal round and reactive to light. Fundoscopic examination is normal. Visual acuity is adequate. Visual fields are full to finger confrontation. Extraocular motility is normal. Face is symmetric and sensation is intact to light touch. Hearing is intact to finger rustle bilaterally. Motor examination - There is normal muscle tone and bulk. Power is full throughout. Muscle stretch reflexes are normoactive and there are no pathological reflexes present. Sensation is intact to light touch, pinprick, vibration and proprioception in all extremities. Cerebellar examination is normal. Gait and stance not assessed due to fall risk, currently in wheel chair. Ambulates with cane at baseline. Most recent CTA  - I personally reviewed this image   CT Result (most recent):  CTA HEAD NECK W WO CONTRAST 11/19/2022    Narrative  History: Right arm weakness since this morning    Comments: CT ANGIOGRAM OF THE NECK AND CT ANGIOGRAM OF THE [place] was  obtained following the administration of IV contrast. IV contrast was  administered to evaluate the arterial vasculature.  Reformatted images in the  coronal and sagittal planes as well as 3-D imaging was obtained and reviewed on  a dedicated PACS and [employer] workstation. Radiation reduction dose techniques  were used for the study. Our CT scanner use one or all of the following-  Automated exposure control, adjustment of the mA and/or KV according the patient  size, iterative reconstruction. All measurements are based upon NASCET criteria  if appropriate. This study was analyzed by the [id number].Algorithm    Findings:    CT ANGIOGRAM OF THE NECK: The arch and proximal great vessels are patent with  diffuse atherosclerotic changes diffuse atherosclerotic changes of the carotid  bulbs are noted with approximately 50-55% narrowing of the right carotid bulb  and 55-60% narrowing of the proximal right internal carotid artery. The left  demonstrates less than 50% narrowing. The proximal vertebral arteries are patent  with diffuse atherosclerotic changes noted    Incompletely evaluated right and possibly left pleural effusion. Chronic lung  changes are present. Probable pulmonary edema. The thyroid gland is grossly  unremarkable. CT angiogram of [place]:    The petrous, cavernous, and supraclinoid internal carotid arteries demonstrate  diffuse atherosclerotic changes however no significant stenosis. There is a  prominent infundibulum of the fetal origin of the left posterior cerebral  artery. The anterior circulation is patent. The middle cerebral arteries are patent. The distal vertebral arteries posterior inferior cerebellar arteries, basilar  artery and posterior cerebral arteries are patent. The dural venous sinuses are not optimally visualized however grossly appear  patent. Impression  1. Diffuse atherosclerotic changes noted within the neck and [place]  with more advanced changes noted of the right carotid bulb and proximal right  internal carotid artery as described above. 2. Chronic lung changes with incompletely evaluated right pleural effusion and  probable pulmonary edema, congestive heart failure likely.       Most recent MRI - I personally reviewed this image   MRI Result (most recent):  MRI BRAIN WO CONTRAST 11/19/2022    Narrative  MRI brain without contrast:    History: TIA, right arm weakness. Imaging sequences: Multiplanar multisequence imaging was performed on a 1.5  Leslie magnet. Comparison: None. Correlation is made to the CT scan of the brain 11/19/2022. Findings: The ventricles and sulci are normal in size and configuration. There  are no extra-axial fluid collections. Normal flow voids are present within all  of the major intracranial vessels. No evidence of intraparenchymal hemorrhage  or mass effect is identified. .  There are no areas of restricted diffusion to  suggest an acute or subacute infarction. Patchy and discrete foci of T2 prolongation are present within the  supratentorial white matter. These are nonspecific findings but would be most  compatible with mild chronic small vessel ischemic changes. 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 The mastoid air cells are clear. Impression  1. No evidence of acute infarction. 2.  Findings most compatible with mild chronic small vessel ischemic change. 11/19/22    TRANSTHORACIC ECHOCARDIOGRAM (TTE) COMPLETE (CONTRAST/BUBBLE/3D PRN) 11/21/2022 11:23 AM (Final)    Interpretation Summary    Left Ventricle: Severely reduced left ventricular systolic function with a visually estimated EF of 25 - 30%. Left ventricle is severely dilated. Mildly increased wall thickness. Severe global hypokinesis present. Abnormal diastolic function. Aortic Valve: Mild regurgitation. Mitral Valve: Moderate regurgitation. Tricuspid Valve: Moderate regurgitation. Moderately elevated RVSP. The estimated RVSP is 43 mmHg. Left Atrium: Left atrium is severely dilated. Aorta: Mildly dilated aortic root. Contrast used: Definity.     Signed by: Leyla Nielson MD on 11/21/2022 11:23 AM  Lab Results   Component Value Date    CHOL 113 11/20/2022 CHOL 114 11/02/2022    CHOL 152 04/04/2022     Lab Results   Component Value Date    TRIG 57 11/20/2022    TRIG 61 11/02/2022    TRIG 67 04/04/2022     Lab Results   Component Value Date    HDL 34 (L) 11/20/2022    HDL 48 11/02/2022    HDL 47 04/04/2022     Lab Results   Component Value Date    LDLCALC 67.6 11/20/2022    LDLCALC 53.8 11/02/2022    LDLCALC 92 04/04/2022     Lab Results   Component Value Date    LABVLDL 11.4 11/20/2022    LABVLDL 12.2 11/02/2022    LABVLDL 12.2 12/22/2021    VLDL 13 04/04/2022    VLDL 13 04/21/2021    VLDL 12 09/24/2020     Lab Results   Component Value Date    CHOLHDLRATIO 3.3 11/20/2022    CHOLHDLRATIO 2.4 11/02/2022    CHOLHDLRATIO 2.7 12/22/2021     Hemoglobin A1C   Date Value Ref Range Status   11/20/2022 5.9 (H) 4.8 - 5.6 % Final       Gladys Ritchie was seen today for follow-up from hospital and transient ischemic attack. Diagnoses and all orders for this visit:    Hospital discharge follow-up  -     ID DISCHARGE MEDS RECONCILED W/ CURRENT OUTPATIENT MED LIST    TIA (transient ischemic attack)  Continue secondary stroke prevention   Goal SBP <130/80  Goal LDL<70  Goal A1C <7.0  Discussed Mediterranean diet and increasing cardiovascular exercise to goal of 30 minutes daily. Depression screening completed. Reviewed BE FAST and when to call 911. Dilated cardiomyopathy ([facility]  Followed by Cardiology. History of smoking 25-50 pack years    ILD (interstitial lung disease) ([facility]  Followed by Pulmonary  Peripheral edema  Secondary to #2  Clubbing of fingers  Secondary to #4      Follow up in 6 months or sooner if needed    I spent greater than 50% of the 60 total minutes of today's visit in coordination of care and patient/family education and counseling regarding the above patient concerns, reviewing the patient's medical record, my assessment and recommendations.           [facility]\"" [place], [address] [place] [address] Neurology [address], [address] [place] [address] [phone]

## 2022-12-29 ENCOUNTER — OFFICE VISIT (OUTPATIENT)
Dept: INTERNAL MEDICINE CLINIC | Facility: CLINIC | Age: 67
End: 2022-12-29
Payer: COMMERCIAL

## 2022-12-29 ENCOUNTER — TELEPHONE (OUTPATIENT)
Dept: CARDIOLOGY CLINIC | Age: 67
End: 2022-12-29

## 2022-12-29 ENCOUNTER — HOME HEALTH ADMISSION (OUTPATIENT)
Dept: HOME HEALTH SERVICES | Facility: HOME HEALTH | Age: 67
End: 2022-12-29

## 2022-12-29 VITALS
DIASTOLIC BLOOD PRESSURE: 70 MMHG | SYSTOLIC BLOOD PRESSURE: 100 MMHG | WEIGHT: 256 LBS | HEART RATE: 80 BPM | TEMPERATURE: 97.4 F | BODY MASS INDEX: 38.8 KG/M2 | HEIGHT: 68 IN
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
## 2022-12-29 DIAGNOSIS — J84.9 ILD (INTERSTITIAL LUNG DISEASE) (HCC): ICD-10-CM

## 2022-12-29 DIAGNOSIS — G45.9 TIA (TRANSIENT ISCHEMIC ATTACK): ICD-10-CM

## 2022-12-29 DIAGNOSIS — I42.0 DILATED CARDIOMYOPATHY (HCC): ICD-10-CM

## 2022-12-29 DIAGNOSIS — M54.6 ACUTE RIGHT-SIDED THORACIC BACK PAIN: ICD-10-CM

## 2022-12-29 DIAGNOSIS — M54.50 ACUTE RIGHT-SIDED LOW BACK PAIN WITHOUT SCIATICA: ICD-10-CM

## 2022-12-29 DIAGNOSIS — R06.02 SHORTNESS OF BREATH: Primary | ICD-10-CM

## 2022-12-29 PROCEDURE — 3074F SYST BP LT 130 MM HG: CPT | Performed by: NURSE PRACTITIONER

## 2022-12-29 PROCEDURE — 1123F ACP DISCUSS/DSCN MKR DOCD: CPT | Performed by: NURSE PRACTITIONER

## 2022-12-29 PROCEDURE — 3078F DIAST BP <80 MM HG: CPT | Performed by: NURSE PRACTITIONER

## 2022-12-29 PROCEDURE — 99213 OFFICE O/P EST LOW 20 MIN: CPT | Performed by: NURSE PRACTITIONER

## 2022-12-29 PROCEDURE — G8417 CALC BMI ABV UP PARAM F/U: HCPCS | Performed by: NURSE PRACTITIONER

## 2022-12-29 PROCEDURE — 1036F TOBACCO NON-USER: CPT | Performed by: NURSE PRACTITIONER

## 2022-12-29 PROCEDURE — 3017F COLORECTAL CA SCREEN DOC REV: CPT | Performed by: NURSE PRACTITIONER

## 2022-12-29 PROCEDURE — G8427 DOCREV CUR MEDS BY ELIG CLIN: HCPCS | Performed by: NURSE PRACTITIONER

## 2022-12-29 PROCEDURE — G8484 FLU IMMUNIZE NO ADMIN: HCPCS | Performed by: NURSE PRACTITIONER

## 2022-12-29 RX ORDER — CETIRIZINE HYDROCHLORIDE 10 MG/1
10 TABLET ORAL DAILY
Status: ON HOLD | COMMUNITY

## 2022-12-29 NOTE — PROGRESS NOTES
[facility] Primary Care      2022    Patient Name: Lul Vásquez [facility]  :  1955      Chief Complaint:  Chief Complaint   Patient presents with    Referral - General     Pt requesting home health referral due to increased weakness and SOB. He reports having a fall at home on 22 due to carpet and hurt his back. HPI  Patient presents today accompanied by his sister with request for referral to home health. He reports history of TIA in November and a fall that occurred two days ago. He is being cared for by his sister and his niece, and they all feel that he would benefit from home health, as well as home PT. Patient with history of dilated cardiomyopathy and interstitial lung disease. He has noticed increased generalized weakness and SOB over the past 6-8 weeks. He denies any associated CP, palpitations, dizziness, headache, lightheadedness or syncope. Per his niece, LE edema is at baseline. Based on chart review, it appears that patient's weight is up 16 pounds over the past 2-3 weeks. He does not do daily weights at home. Does not follow a low sodium diet but reports that appetite is poor. Drinks \"a lot\" (water, soda, juice) per sister. He fell in the bathroom after slipping on a bath mat two days ago. Fall was witnessed by his sister. No LOC. He did not hit his head. He reports hitting his back during the fall. Since that time, he has had pain to his right mid and lower back. Scheduled to follow-up with both cardiology and pulmonary next week. He reports compliance with medications as prescribed.          Past Medical History:   Diagnosis Date    Aortic regurgitation     mod-severe eccentric-  ECHO 22; per MD note 22 not controlled    Bronchiectasis ([address]     Cardiomyopathy ([address]     CHF (congestive heart failure) ([address]     ED (erectile dysfunction)     Edema     H/O coronary angiogram 2022    cath report: mild LM disease, severe MV, severe LV dysfunction, increased LVEDP. MD note 22: Quang Velazquez RCA ~90%, other anatomy with mild non-obstructive disease\"    History of transesophageal echocardiography (CARY) 2022    EF 15-20%, left ventrical severely dilated, AV mod-severe eccentric regurgitation, MVR mild to moderate, RVSP 41 mmHg    Hyperlipidemia     Hypertension     medication    ILD (interstitial lung disease) ([address]     Ofev    Murmur     cardiac note 22: No definite PND/ orthopnea. Improved sx since initial eval on 22, down 15-20 pounds, states improved ROOT, persistent lower ext edema, states can walk over a flight of stairs. No chest discomfort. Denies dizziness.     Osteoarthritis of knees, bilateral     Vitamin D deficiency        Past Surgical History:   Procedure Laterality Date    COLONOSCOPY  2019    Dr. Yojana Nelson, due for repeat in 5 years    HERNIA REPAIR Left 2022    INCARCERATED LEFT INGUINAL HERNIA REPAIR W/ MESH performed by Derek Mckeon MD at [facility] MAIN OR    ORTHOPEDIC SURGERY Left     left wrist, cyst       Family History   Problem Relation Age of Onset    Breast Cancer Sister     Heart Disease Father     Coronary Art Dis Neg Hx        Social History     Tobacco Use    Smoking status: Former     Packs/day: 1.00     Years: 20.00     Pack years: 20.00     Types: Cigarettes     Quit date: 2014     Years since quittin.6    Smokeless tobacco: Never   Vaping Use    Vaping Use: Never used   Substance Use Topics    Alcohol use: Yes     Comment: rare    Drug use: No       Current Outpatient Medications:     cetirizine (ZYRTEC) 10 MG tablet, Take 10 mg by mouth daily, Disp: , Rfl:     aspirin 81 MG EC tablet, Take 1 tablet by mouth daily, Disp: 30 tablet, Rfl: 3    atorvastatin (LIPITOR) 80 MG tablet, Take 1 tablet by mouth nightly, Disp: 30 tablet, Rfl: 3    Budeson-Glycopyrrol-Formoterol (BREZTRI AEROSPHERE) 160-9-4.8 MCG/ACT AERO, Inhale 2 puffs into the lungs 2 times daily, Disp: 1 each, Rfl: 11    metoprolol succinate (TOPROL XL) 25 MG extended release tablet, Take 1 tablet by mouth daily, Disp: 30 tablet, Rfl: 3    bumetanide (BUMEX) 1 MG tablet, Take 1 mg by mouth 2 times daily, Disp: , Rfl:     Nintedanib Esylate (OFEV) 150 MG CAPS, TAKE 1 CAPSULE BY MOUTH TWICE A DAY 12 HOURS APART WITH FOOD, Disp: , Rfl:     spironolactone (ALDACTONE) 25 MG tablet, Take 25 mg by mouth daily, Disp: , Rfl:     clopidogrel (PLAVIX) 75 MG tablet, Take 1 tablet by mouth daily for 18 doses, Disp: 30 tablet, Rfl: 3    Allergies   Allergen Reactions    Penicillin G Shortness Of Breath       Review of Systems   Constitutional:  Positive for appetite change (decreased), fatigue and unexpected weight change. Negative for chills and fever. HENT:  Negative for congestion and sore throat. Respiratory:  Positive for shortness of breath. Negative for cough and wheezing. Cardiovascular:  Positive for leg swelling. Negative for chest pain and palpitations. Gastrointestinal:  Negative for abdominal pain, constipation, diarrhea, nausea and vomiting. Genitourinary:  Negative for dysuria and hematuria. Neurological:  Positive for weakness. Negative for dizziness, light-headedness and headaches. Objective:  /70 (Site: Left Upper Arm, Position: Sitting, Cuff Size: Large Adult)   Pulse 80   Temp 97.4 °F (36.3 °C) (Temporal)   Ht 5' 8\" (1.727 m)   Wt 256 lb (116.1 kg)   BMI 38.92 kg/m²     Examination:  Physical Exam  Vitals and nursing note reviewed. Constitutional:       General: He is not in acute distress. Appearance: Normal appearance. HENT:      Right Ear: Tympanic membrane, ear canal and external ear normal.      Left Ear: Tympanic membrane, ear canal and external ear normal.      Nose: Nose normal.      Mouth/Throat:      Mouth: Mucous membranes are moist.      Pharynx: Oropharynx is clear. Cardiovascular:      Rate and Rhythm: Normal rate and regular rhythm.    Pulmonary:      Effort: Pulmonary effort is normal. No respiratory distress. Breath sounds: Normal breath sounds. No wheezing or rales. Abdominal:      General: Bowel sounds are normal.      Palpations: Abdomen is soft. Musculoskeletal:      Thoracic back: Tenderness (right-sided) present. Decreased range of motion. Lumbar back: Tenderness (right-sided) present. Decreased range of motion. Right lower leg: Edema (1+) present. Left lower leg: Edema (1+) present. Skin:     General: Skin is warm and dry. Neurological:      Mental Status: He is alert and oriented to person, place, and time. Psychiatric:         Mood and Affect: Mood normal.         Assessment/Plan:    Miguel Angel Harrison was seen today for referral - general.    Diagnoses and all orders for this visit:    Shortness of breath  -     Brain Natriuretic Peptide; Future  -     CBC with Auto Differential; Future  -     Comprehensive Metabolic Panel; Future  -     XR CHEST PA LAT (2 VIEWS); Future  Lab work and chest x-ray today. Dilated cardiomyopathy ([facility]  -     [address] [facility]    ILD [facility]inter[facility]) [facility]  -     [address] [facility]    TIA (transient ischemic attack)  -     [address] [facility], initial encounter  -     [address] [facility]    Acute right-sided thoracic back pain  -     XR THORACIC SPINE (MIN 4 VIEWS); Future  Mild tenderness on exam. X-rays today. Acute right-sided low back pain without sciatica  -     XR LUMBAR SPINE (2-3 VIEWS); Future  As above. On this date, 12/30/22, I have spent 40 minutes reviewing previous notes, test results and face to face with the patient discussing the diagnosis and importance of compliance with the treatment plan as well as documenting on the day of the visit. An electronic signature was used to authenticate this note.   BINDU Norton

## 2022-12-29 NOTE — TELEPHONE ENCOUNTER
Can increase bumex to 2 BID if needed over the weekend. Elevate legs. Avoid salt. See Xander Fenton next week. To [facility] ER ASAP if worsening over the weekend.    Thanks

## 2022-12-29 NOTE — TELEPHONE ENCOUNTER
Pt sister Ubaldo Jay states that pt went to family doctor seen Luis Mcintosh at [facility]. Madelin states that pt weight went from 240-256 in 2-3 weeks. Pt has been SOB. Madelin states that pt fell 2 days ago pt has some bruising but didn't not break anything. Madelin also states that [facility] done test today and also xray to make sure there wasn't anything wrong when pt fell. Madelin would appreciate a call back.

## 2022-12-29 NOTE — TELEPHONE ENCOUNTER
PCP, Amena Chino NP, told sister, Marvin Mc, that patient gained 16 lbs, from 240 lbs  to 256 lbs in 2-3 weeks. Patient is unable to stand to weigh on home scales. Increased SOB on exertion. Has to stop and rest when walking about 10-20 feet. Increased difficulty walking due to increased weakness and SOB. Fell 2 days ago. Increased swelling in both feet and legs up into upper thighs. Skin on thighs feels very tight and itchy. Some redness in upper thighs where patient has been rubbing itchy areas. No increased pain or heat in legs. Taking Bumex 1 mg qd, spironolactone 25 mg qd, Toprol XL 25 mg qd, ASA 81 mg qd, Plavix 75 mg qd, and Lipitor 80 mg qd. Had BNP, CBC, and CMP ordered by PCP, today. Results not yet available. Next scheduled office visit with Dr. Richi Nesbitt is 1/5/23. Madelin asks recommendations for weight gain, edema, and SOB.

## 2022-12-29 NOTE — TELEPHONE ENCOUNTER
Advised Madelin of Dr. Debbie Lopez response. Advised Madelin to keep 1/5/23 appointment with Dr. Chantel Diallo and call with any further questions or concerns. Madelin verbalized understanding.

## 2022-12-30 ENCOUNTER — HOSPITAL ENCOUNTER (INPATIENT)
Age: 67
LOS: 4 days | Discharge: HOSPICE | DRG: 682 | End: 2023-01-03
Attending: EMERGENCY MEDICINE | Admitting: FAMILY MEDICINE
Payer: MEDICARE

## 2022-12-30 ENCOUNTER — HOSPITAL ENCOUNTER (EMERGENCY)
Dept: GENERAL RADIOLOGY | Age: 67
Discharge: HOME | DRG: 682 | End: 2023-01-02
Payer: MEDICARE

## 2022-12-30 ENCOUNTER — HOSPITAL ENCOUNTER (EMERGENCY)
Dept: ULTRASOUND IMAGING | Age: 67
Discharge: HOME | DRG: 682 | End: 2023-01-02
Payer: MEDICARE

## 2022-12-30 DIAGNOSIS — R77.8 ELEVATED TROPONIN: ICD-10-CM

## 2022-12-30 DIAGNOSIS — I50.23 ACUTE ON CHRONIC SYSTOLIC CONGESTIVE HEART FAILURE (HCC): ICD-10-CM

## 2022-12-30 DIAGNOSIS — N17.9 ACUTE RENAL FAILURE, UNSPECIFIED ACUTE RENAL FAILURE TYPE (HCC): Primary | ICD-10-CM

## 2022-12-30 DIAGNOSIS — I50.9 CONGESTIVE HEART FAILURE, UNSPECIFIED HF CHRONICITY, UNSPECIFIED HEART FAILURE TYPE (HCC): ICD-10-CM

## 2022-12-30 DIAGNOSIS — R06.00 DYSPNEA, UNSPECIFIED TYPE: ICD-10-CM

## 2022-12-30 DIAGNOSIS — Z51.5 HOSPICE CARE: ICD-10-CM

## 2022-12-30 PROBLEM — I50.42 CHRONIC COMBINED SYSTOLIC AND DIASTOLIC CONGESTIVE HEART FAILURE (HCC): Status: ACTIVE | Noted: 2022-04-18

## 2022-12-30 PROBLEM — I24.89 DEMAND ISCHEMIA OF MYOCARDIUM: Status: ACTIVE | Noted: 2022-01-01

## 2022-12-30 PROBLEM — I24.8 DEMAND ISCHEMIA OF MYOCARDIUM (HCC): Status: ACTIVE | Noted: 2022-12-30

## 2022-12-30 PROBLEM — G45.9 TIA (TRANSIENT ISCHEMIC ATTACK): Status: RESOLVED | Noted: 2022-01-01 | Resolved: 2022-01-01

## 2022-12-30 PROBLEM — N18.31 CHRONIC KIDNEY DISEASE, STAGE 3A (HCC): Chronic | Status: ACTIVE | Noted: 2022-12-30

## 2022-12-30 LAB
ALBUMIN SERPL-MCNC: 3 G/DL (ref 3.2–4.6)
ALBUMIN/GLOB SERPL: 0.8 {RATIO} (ref 0.4–1.6)
ALP SERPL-CCNC: 203 U/L (ref 50–136)
ALT SERPL-CCNC: 76 U/L (ref 12–65)
ANION GAP SERPL CALC-SCNC: 10 MMOL/L (ref 2–11)
AST SERPL-CCNC: 79 U/L (ref 15–37)
BASOPHILS # BLD: 0 K/UL (ref 0–0.2)
BASOPHILS NFR BLD: 0 % (ref 0–2)
BILIRUB SERPL-MCNC: 5.3 MG/DL (ref 0.2–1.1)
BUN SERPL-MCNC: 33 MG/DL (ref 8–23)
CALCIUM SERPL-MCNC: 9.4 MG/DL (ref 8.3–10.4)
CHLORIDE SERPL-SCNC: 101 MMOL/L (ref 101–110)
CO2 SERPL-SCNC: 26 MMOL/L (ref 21–32)
CREAT SERPL-MCNC: 2.43 MG/DL (ref 0.8–1.5)
DIFFERENTIAL METHOD BLD: ABNORMAL
EOSINOPHIL # BLD: 0 K/UL (ref 0–0.8)
EOSINOPHIL NFR BLD: 0 % (ref 0.5–7.8)
ERYTHROCYTE [DISTWIDTH] IN BLOOD BY AUTOMATED COUNT: 15.4 % (ref 11.9–14.6)
GLOBULIN SER CALC-MCNC: 3.8 G/DL (ref 2.8–4.5)
GLUCOSE SERPL-MCNC: 105 MG/DL (ref 65–100)
HCT VFR BLD AUTO: 50.4 % (ref 41.1–50.3)
HGB BLD-MCNC: 16.5 G/DL (ref 13.6–17.2)
IMM GRANULOCYTES # BLD AUTO: 0 K/UL (ref 0–0.5)
IMM GRANULOCYTES NFR BLD AUTO: 0 % (ref 0–5)
LYMPHOCYTES # BLD: 1.1 K/UL (ref 0.5–4.6)
LYMPHOCYTES NFR BLD: 17 % (ref 13–44)
MCH RBC QN AUTO: 32.5 PG (ref 26.1–32.9)
MCHC RBC AUTO-ENTMCNC: 32.7 G/DL (ref 31.4–35)
MCV RBC AUTO: 99.2 FL (ref 82–102)
MONOCYTES # BLD: 0.5 K/UL (ref 0.1–1.3)
MONOCYTES NFR BLD: 7 % (ref 4–12)
NEUTS SEG # BLD: 5 K/UL (ref 1.7–8.2)
NEUTS SEG NFR BLD: 75 % (ref 43–78)
NRBC # BLD: 0 K/UL (ref 0–0.2)
NT PRO BNP: ABNORMAL PG/ML (ref 5–125)
PLATELET # BLD AUTO: 102 K/UL (ref 150–450)
PMV BLD AUTO: 10.2 FL (ref 9.4–12.3)
POTASSIUM SERPL-SCNC: 4.4 MMOL/L (ref 3.5–5.1)
PROT SERPL-MCNC: 6.8 G/DL (ref 6.3–8.2)
RBC # BLD AUTO: 5.08 M/UL (ref 4.23–5.6)
SODIUM SERPL-SCNC: 137 MMOL/L (ref 133–143)
TROPONIN I SERPL HS-MCNC: 178.2 PG/ML (ref 0–14)
TROPONIN I SERPL HS-MCNC: 198.9 PG/ML (ref 0–14)
WBC # BLD AUTO: 6.6 K/UL (ref 4.3–11.1)

## 2022-12-30 PROCEDURE — 6360000002 HC RX W HCPCS: Performed by: FAMILY MEDICINE

## 2022-12-30 PROCEDURE — P9047 ALBUMIN (HUMAN), 25%, 50ML: HCPCS | Performed by: FAMILY MEDICINE

## 2022-12-30 PROCEDURE — 2000000000 HC ICU R&B

## 2022-12-30 PROCEDURE — 85025 COMPLETE CBC W/AUTO DIFF WBC: CPT

## 2022-12-30 PROCEDURE — 76705 ECHO EXAM OF ABDOMEN: CPT

## 2022-12-30 PROCEDURE — 6370000000 HC RX 637 (ALT 250 FOR IP): Performed by: FAMILY MEDICINE

## 2022-12-30 PROCEDURE — 83880 ASSAY OF NATRIURETIC PEPTIDE: CPT

## 2022-12-30 PROCEDURE — 84484 ASSAY OF TROPONIN QUANT: CPT

## 2022-12-30 PROCEDURE — 80053 COMPREHEN METABOLIC PANEL: CPT

## 2022-12-30 PROCEDURE — 93005 ELECTROCARDIOGRAM TRACING: CPT | Performed by: EMERGENCY MEDICINE

## 2022-12-30 PROCEDURE — 2500000003 HC RX 250 WO HCPCS: Performed by: FAMILY MEDICINE

## 2022-12-30 PROCEDURE — 71046 X-RAY EXAM CHEST 2 VIEWS: CPT

## 2022-12-30 PROCEDURE — 99285 EMERGENCY DEPT VISIT HI MDM: CPT

## 2022-12-30 PROCEDURE — 2580000003 HC RX 258: Performed by: FAMILY MEDICINE

## 2022-12-30 RX ORDER — ACETAMINOPHEN 325 MG/1
650 TABLET ORAL EVERY 6 HOURS PRN
Status: DISCONTINUED | OUTPATIENT
Start: 2022-12-30 | End: 2023-01-03 | Stop reason: HOSPADM

## 2022-12-30 RX ORDER — POTASSIUM CHLORIDE 20 MEQ/1
40 TABLET, EXTENDED RELEASE ORAL PRN
Status: DISCONTINUED | OUTPATIENT
Start: 2022-12-30 | End: 2023-01-03 | Stop reason: HOSPADM

## 2022-12-30 RX ORDER — MAGNESIUM SULFATE IN WATER 40 MG/ML
2000 INJECTION, SOLUTION INTRAVENOUS PRN
Status: DISCONTINUED | OUTPATIENT
Start: 2022-12-30 | End: 2022-12-30 | Stop reason: SDUPTHER

## 2022-12-30 RX ORDER — ASPIRIN 81 MG/1
81 TABLET ORAL DAILY
Status: DISCONTINUED | OUTPATIENT
Start: 2022-12-31 | End: 2023-01-03 | Stop reason: HOSPADM

## 2022-12-30 RX ORDER — POTASSIUM CHLORIDE 7.45 MG/ML
10 INJECTION INTRAVENOUS PRN
Status: DISCONTINUED | OUTPATIENT
Start: 2022-12-30 | End: 2023-01-03 | Stop reason: HOSPADM

## 2022-12-30 RX ORDER — MILRINONE LACTATE 0.2 MG/ML
.0625-.75 INJECTION, SOLUTION INTRAVENOUS CONTINUOUS
Status: DISCONTINUED | OUTPATIENT
Start: 2022-12-30 | End: 2022-12-30 | Stop reason: RX

## 2022-12-30 RX ORDER — SPIRONOLACTONE 25 MG/1
25 TABLET ORAL DAILY
Status: DISCONTINUED | OUTPATIENT
Start: 2022-12-31 | End: 2023-01-03 | Stop reason: HOSPADM

## 2022-12-30 RX ORDER — MAGNESIUM SULFATE IN WATER 40 MG/ML
2000 INJECTION, SOLUTION INTRAVENOUS PRN
Status: DISCONTINUED | OUTPATIENT
Start: 2022-12-30 | End: 2023-01-03 | Stop reason: HOSPADM

## 2022-12-30 RX ORDER — SODIUM CHLORIDE 0.9 % (FLUSH) 0.9 %
5-40 SYRINGE (ML) INJECTION PRN
Status: DISCONTINUED | OUTPATIENT
Start: 2022-12-30 | End: 2023-01-03 | Stop reason: HOSPADM

## 2022-12-30 RX ORDER — SODIUM CHLORIDE 9 MG/ML
INJECTION, SOLUTION INTRAVENOUS PRN
Status: DISCONTINUED | OUTPATIENT
Start: 2022-12-30 | End: 2023-01-03 | Stop reason: HOSPADM

## 2022-12-30 RX ORDER — ENOXAPARIN SODIUM 100 MG/ML
30 INJECTION SUBCUTANEOUS 2 TIMES DAILY
Status: DISCONTINUED | OUTPATIENT
Start: 2022-12-30 | End: 2022-12-30 | Stop reason: ALTCHOICE

## 2022-12-30 RX ORDER — HEPARIN SODIUM 5000 [USP'U]/ML
5000 INJECTION, SOLUTION INTRAVENOUS; SUBCUTANEOUS EVERY 8 HOURS SCHEDULED
Status: DISCONTINUED | OUTPATIENT
Start: 2022-12-31 | End: 2023-01-01

## 2022-12-30 RX ORDER — ONDANSETRON 2 MG/ML
4 INJECTION INTRAMUSCULAR; INTRAVENOUS EVERY 6 HOURS PRN
Status: DISCONTINUED | OUTPATIENT
Start: 2022-12-30 | End: 2023-01-03 | Stop reason: HOSPADM

## 2022-12-30 RX ORDER — POTASSIUM CHLORIDE 20 MEQ/1
40 TABLET, EXTENDED RELEASE ORAL PRN
Status: DISCONTINUED | OUTPATIENT
Start: 2022-12-30 | End: 2022-12-30 | Stop reason: SDUPTHER

## 2022-12-30 RX ORDER — ALBUMIN (HUMAN) 12.5 G/50ML
25 SOLUTION INTRAVENOUS EVERY 6 HOURS
Status: DISPENSED | OUTPATIENT
Start: 2022-12-30 | End: 2022-12-31

## 2022-12-30 RX ORDER — ACETAMINOPHEN 650 MG/1
650 SUPPOSITORY RECTAL EVERY 6 HOURS PRN
Status: DISCONTINUED | OUTPATIENT
Start: 2022-12-30 | End: 2023-01-03 | Stop reason: HOSPADM

## 2022-12-30 RX ORDER — METOPROLOL SUCCINATE 25 MG/1
25 TABLET, EXTENDED RELEASE ORAL DAILY
Status: DISCONTINUED | OUTPATIENT
Start: 2022-12-31 | End: 2023-01-03 | Stop reason: HOSPADM

## 2022-12-30 RX ORDER — ATORVASTATIN CALCIUM 40 MG/1
80 TABLET, FILM COATED ORAL NIGHTLY
Status: DISCONTINUED | OUTPATIENT
Start: 2022-12-30 | End: 2023-01-03 | Stop reason: HOSPADM

## 2022-12-30 RX ORDER — ONDANSETRON 4 MG/1
4 TABLET, ORALLY DISINTEGRATING ORAL EVERY 8 HOURS PRN
Status: DISCONTINUED | OUTPATIENT
Start: 2022-12-30 | End: 2023-01-03 | Stop reason: HOSPADM

## 2022-12-30 RX ORDER — DOPAMINE HYDROCHLORIDE 320 MG/100ML
1-20 INJECTION, SOLUTION INTRAVENOUS CONTINUOUS
Status: DISCONTINUED | OUTPATIENT
Start: 2022-12-30 | End: 2023-01-03 | Stop reason: HOSPADM

## 2022-12-30 RX ORDER — POTASSIUM CHLORIDE 7.45 MG/ML
10 INJECTION INTRAVENOUS PRN
Status: DISCONTINUED | OUTPATIENT
Start: 2022-12-30 | End: 2022-12-30 | Stop reason: SDUPTHER

## 2022-12-30 RX ORDER — POLYETHYLENE GLYCOL 3350 17 G/17G
17 POWDER, FOR SOLUTION ORAL DAILY PRN
Status: DISCONTINUED | OUTPATIENT
Start: 2022-12-30 | End: 2023-01-03 | Stop reason: HOSPADM

## 2022-12-30 RX ORDER — SODIUM CHLORIDE 0.9 % (FLUSH) 0.9 %
5-40 SYRINGE (ML) INJECTION EVERY 12 HOURS SCHEDULED
Status: DISCONTINUED | OUTPATIENT
Start: 2022-12-30 | End: 2023-01-03 | Stop reason: HOSPADM

## 2022-12-30 RX ADMIN — ATORVASTATIN CALCIUM 80 MG: 40 TABLET, FILM COATED ORAL at 22:52

## 2022-12-30 RX ADMIN — ALBUMIN (HUMAN) 25 G: 0.25 INJECTION, SOLUTION INTRAVENOUS at 23:38

## 2022-12-30 RX ADMIN — BUMETANIDE 0.2 MG/HR: 0.25 INJECTION INTRAMUSCULAR; INTRAVENOUS at 23:30

## 2022-12-30 RX ADMIN — SODIUM CHLORIDE, PRESERVATIVE FREE 5 ML: 5 INJECTION INTRAVENOUS at 23:34

## 2022-12-30 ASSESSMENT — ENCOUNTER SYMPTOMS
VOMITING: 0
PHOTOPHOBIA: 0
DIARRHEA: 0
CHEST TIGHTNESS: 0
RHINORRHEA: 0
DIARRHEA: 0
COUGH: 0
WHEEZING: 0
WHEEZING: 0
SHORTNESS OF BREATH: 1
SORE THROAT: 0
CONSTIPATION: 0
COUGH: 1
VOMITING: 0
TROUBLE SWALLOWING: 0
SHORTNESS OF BREATH: 1
APNEA: 0
VOICE CHANGE: 0
NAUSEA: 0
FACIAL SWELLING: 0
ABDOMINAL PAIN: 0
SORE THROAT: 0
EYE REDNESS: 0
ABDOMINAL PAIN: 0
EYE DISCHARGE: 0
EYE PAIN: 0
ABDOMINAL DISTENTION: 1

## 2022-12-30 ASSESSMENT — PAIN - FUNCTIONAL ASSESSMENT
PAIN_FUNCTIONAL_ASSESSMENT: NONE - DENIES PAIN
PAIN_FUNCTIONAL_ASSESSMENT: NONE - DENIES PAIN
PAIN_FUNCTIONAL_ASSESSMENT: 0-10

## 2022-12-30 ASSESSMENT — PAIN SCALES - GENERAL: PAINLEVEL_OUTOF10: 0

## 2022-12-30 NOTE — ED TRIAGE NOTES
Patient brought into triage via wheelchair. Patient c\o abnormal lab values after getting blood work done yesterday. Patient states he was referred to the ER by primary care doctor because his Paula Bang is not pumping correctly and his creatine is elevated. \"

## 2022-12-30 NOTE — ED PROVIDER NOTES
Emergency Department Provider Note                   PCP:                Balta Bonner MD               Age: 78 y.o. Sex: male       ICD-10-CM    1. Acute renal failure, unspecified acute renal failure type ([facility] [address]  N17.9       2. Elevated troponin  R77.8       3. Acute on chronic systolic congestive heart failure (HCC)  I50.23       4. Congestive heart failure, unspecified HF chronicity, unspecified heart failure type ([address]  I50.9 Transthoracic echocardiogram (TTE) limited with contrast, bubble, strain, and 3D PRN          DISPOSITION Admitted 12/30/2022 08:22:59 PM  ===================================  ED EKG Interpretation  EKG was interpreted in the absence of a cardiologist.    Rate: 89  EKG Interpretation: EKG Interpretation: sinus rhythm  ST Segments: Normal ST segments - NO STEMI  First-degree AV block present. Some PACs present. FELIPE Ponce 8:39 PM         MDM  Number of Diagnoses or Management Options  Acute on chronic systolic congestive heart failure (HCC)  Acute renal failure, unspecified acute renal failure type (HCC)  Congestive heart failure, unspecified HF chronicity, unspecified heart failure type (HCC)  Elevated troponin  Diagnosis management comments: 3:45 PM  Patient with chronic history of multiple medical problems to include aortic regurgitation, CHF with most recent ejection fraction of 15 to 20%, hyperlipidemia, hypertension presenting with increasing shortness of breath, weight gain and abnormal lab values per primary care provider. Labs today so far reflect acute kidney injury as well as increased liver function. Suspect acute heart failure exacerbation with BRYON. Increased liver function. Consider hepatorenal syndrome. Ultrasound pending. Hold off on diuresis for now until we recheck blood pressure. Suspect patient will likely need to be admitted for heart failure exacerbation and BRYON and elevated liver enzymes.     5:54 PM  Discussed case with hospitalist  Uzma Das. He will be admitted for BRYON, CHF exacerbation. Ultrasound still pending. Initial troponin elevated at 170 however I feel this is due to the acute kidney failure and cardiomyopathy rather than acute cardiac obstruction. 8:39 PM  Ultrasound revealed gallbladder wall thickening which I feel is probably due to adjacent liver disease. Also revealed ascites. Patient is remained stable throughout boarding in ED. He has been admitted now by hospitalist.       Amount and/or Complexity of Data Reviewed  Clinical lab tests: reviewed and ordered  Tests in the radiology section of CPT®: ordered and reviewed  Discuss the patient with other providers: yes (Patient history, ROS, physical exam, labs, radiological workup and all pertinent findings were discussed with attending Niki Wilson MD; Dr. Uzma Das)  Independent visualization of images, tracings, or specimens: yes    Risk of Complications, Morbidity, and/or Mortality  Presenting problems: moderate  Diagnostic procedures: moderate  Management options: moderate    Patient Progress  Patient progress: stable                        ED Course as of 12/30/22 2039   Fri Dec 30, 2022   1635 4:35 PM  CXR shows IMPRESSION:  Cardiomegaly and pulmonary vascular congestion. [NR]   2649 4:40 PM  Consulted Dr. Uzma Das to get patient admitted. [NR]   2013 8:13 PM  US revealed:    IMPRESSION:  1. No evidence of bile duct obstruction. 2.  Cholelithiasis and gallbladder wall thickening. Gallbladder wall thickening  could be due to inflammation or adjacent liver disease. 3.  Ascites. No abd pain. [NR]      ED Course User Index  [NR] FELIPE Mclaughlin        Orders Placed This Encounter   Procedures    US ABDOMEN LIMITED Specify organ?  GALLBLADDER, LIVER    XR CHEST (2 VW)    CBC with Auto Differential    Comprehensive Metabolic Panel    Brain Natriuretic Peptide    Troponin    Troponin    Vital signs    EKG 12 Lead    ADMIT TO INPATIENT Medications - No data to display    New Prescriptions    No medications on file        Kalyan Hood is a 78 y.o. male who presents to the Emergency Department with chief complaint of    Chief Complaint   Patient presents with    Abnormal Lab      72-year-old male patient with history of aortic regurgitation, CHF with most recent ejection fraction of 15 to 20%, hyperlipidemia, hypertension presents today for evaluation for possible heart failure exacerbation. He reports over the past several months he has been having gradually increasing shortness of breath on exertion. Reports over the past week or so he has had increasing weight gain, increased swelling in his legs and abdomen, as well as acutely increasing shortness of breath on exertion. Reports he used to be able to walk at least 20 to 30 feet before getting tired and now he can only walk about 5-10. He states he was at his primary care provider yesterday where he had lab work drawn. He states he was called by his doctor today and told to come to the ER for elevated kidney function and worsening heart function. Of note, he did have a fall 2 days ago because he slipped and fell in the bathroom but is not in any pain today. Denies chest pain, pleuritic pain, hemoptysis. Denies abdominal pain, fevers, vomiting. Denies history of liver disease. Denies dizziness, lightheadedness, palpitations. Patient is primary historian with help from his sister and quality is reliable. The history is provided by the patient and a relative. No  was used. Review of Systems   Constitutional:  Negative for fatigue and fever. HENT:  Negative for congestion, ear pain, facial swelling, hearing loss, rhinorrhea, sore throat, trouble swallowing and voice change. Eyes:  Negative for photophobia, pain, discharge, redness and visual disturbance. Respiratory:  Positive for cough and shortness of breath.  Negative for apnea, chest tightness and wheezing. Cardiovascular:  Positive for leg swelling. Negative for chest pain and palpitations. Gastrointestinal:  Positive for abdominal distention. Negative for abdominal pain, diarrhea and vomiting. Endocrine: Negative for polydipsia, polyphagia and polyuria. Genitourinary:  Negative for decreased urine volume, dysuria, flank pain, frequency and hematuria. Musculoskeletal:  Negative for arthralgias, joint swelling, myalgias and neck stiffness. Skin:  Negative for rash and wound. Neurological:  Negative for dizziness, syncope, speech difficulty, weakness, light-headedness and headaches. Hematological:  Does not bruise/bleed easily. Psychiatric/Behavioral:  Negative for self-injury and suicidal ideas. All other systems reviewed and are negative. Past Medical History:   Diagnosis Date    Aortic regurgitation     mod-severe eccentric-  ECHO 5/5/22; per MD note 8/8/22 not controlled    Bronchiectasis ([address]     Cardiomyopathy ([address]     CHF (congestive heart failure) ([address]     ED (erectile dysfunction)     Edema     H/O coronary angiogram 05/05/2022    cath report: mild LM disease, severe MV, severe LV dysfunction, increased LVEDP. MD note 8/8/22: Elwyn Risser RCA ~90%, other anatomy with mild non-obstructive disease\"    History of transesophageal echocardiography ([facility]) 05/05/2022    EF 15-20%, left ventrical severely dilated, AV mod-severe eccentric regurgitation, MVR mild to moderate, RVSP 41 mmHg    Hyperlipidemia     Hypertension     medication    ILD (interstitial lung disease) ([address]     Ofev    Murmur     cardiac note 8/8/22: No definite PND/ orthopnea. Improved sx since initial eval on 4/12/22, down 15-20 pounds, states improved ROOT, persistent lower ext edema, states can walk over a flight of stairs. No chest discomfort. Denies dizziness.     Osteoarthritis of knees, bilateral     TIA (transient ischemic attack) 11/19/2022    Vitamin D deficiency         Past Surgical History:   Procedure Laterality Date    COLONOSCOPY  2019    Dr. Everardo Frankel, due for repeat in 5 years    HERNIA REPAIR Left 2022    INCARCERATED LEFT INGUINAL HERNIA REPAIR W/ MESH performed by Shana Costa MD at . [address] Left     left wrist, cyst        Family History   Problem Relation Age of Onset    Breast Cancer Sister     Heart Disease Father     Coronary Art Dis Neg Hx         Social History     Socioeconomic History    Marital status: Legally      Spouse name: None    Number of children: None    Years of education: None    Highest education level: None   Tobacco Use    Smoking status: Former     Packs/day: 1.00     Years: 20.00     Pack years: 20.00     Types: Cigarettes     Quit date: 2014     Years since quittin.6    Smokeless tobacco: Never   Vaping Use    Vaping Use: Never used   Substance and Sexual Activity    Alcohol use: Yes     Comment: rare    Drug use: No    Sexual activity: Not Currently         Penicillin g     Previous Medications    ASPIRIN 81 MG EC TABLET    Take 1 tablet by mouth daily    ATORVASTATIN (LIPITOR) 80 MG TABLET    Take 1 tablet by mouth nightly    BUDESON-GLYCOPYRROL-FORMOTEROL (BREZTRI AEROSPHERE) 160-9-4.8 MCG/ACT AERO    Inhale 2 puffs into the lungs 2 times daily    BUMETANIDE (BUMEX) 1 MG TABLET    Take 1 mg by mouth 2 times daily    CETIRIZINE (ZYRTEC) 10 MG TABLET    Take 10 mg by mouth daily    CLOPIDOGREL (PLAVIX) 75 MG TABLET    Take 1 tablet by mouth daily for 18 doses    METOPROLOL SUCCINATE (TOPROL XL) 25 MG EXTENDED RELEASE TABLET    Take 1 tablet by mouth daily    NINTEDANIB ESYLATE (OFEV) 150 MG CAPS    TAKE 1 CAPSULE BY MOUTH TWICE A DAY 12 HOURS APART WITH FOOD    SPIRONOLACTONE (ALDACTONE) 25 MG TABLET    Take 25 mg by mouth daily        Vitals signs and nursing note reviewed.    Patient Vitals for the past 4 hrs:   Temp Pulse Resp BP SpO2   22 97.5 °F (36.4 °C) 83 16 87/60 94 %          Physical Exam  Vitals and nursing note reviewed. Constitutional:       General: He is not in acute distress. Appearance: Normal appearance. He is obese. He is not ill-appearing, toxic-appearing or diaphoretic. Comments: Pleasant and cooperative. Even nonlabored respirations. Speaks in full sentences. Appears generally fatigued. Alert and oriented x4. Normal mentation. Speaks in full sentences but does tire out. HENT:      Head: Normocephalic and atraumatic. Right Ear: External ear normal.      Left Ear: External ear normal.      Nose: Nose normal.      Mouth/Throat:      Mouth: Mucous membranes are moist.   Eyes:      General: Scleral icterus present. Right eye: No discharge. Left eye: No discharge. Comments: Obvious scleral icterus present   Cardiovascular:      Rate and Rhythm: Normal rate and regular rhythm. Pulses: Normal pulses. Heart sounds: Murmur heard. Pulmonary:      Effort: Pulmonary effort is normal. No respiratory distress. Breath sounds: No stridor. Rales present. No wheezing or rhonchi. Comments: Diffuse lower lobe crackles auscultated  Abdominal:      General: There is distension. Tenderness: There is no abdominal tenderness. There is no guarding or rebound. Comments: Some abdominal distention but no tenderness or guarding or rebound or peritoneal signs. Musculoskeletal:         General: No tenderness. Normal range of motion. Cervical back: Normal range of motion and neck supple. No rigidity or tenderness. Right lower leg: Edema present. Left lower leg: Edema present. Comments: Bilateral lower extremities with 1-2+ pitting edema extending up to proximal calves. Nontender. Bilateral lower extremities with pulses present. Bilateral lower extremities with hyperpigmentation consistent with venous stasis. Lymphadenopathy:      Cervical: No cervical adenopathy. Skin:     General: Skin is warm and dry.       Capillary Refill: Capillary refill takes less than 2 seconds. Coloration: Skin is not jaundiced. Neurological:      General: No focal deficit present. Mental Status: He is alert and oriented to person, place, and time. Mental status is at baseline. Procedures    Results for orders placed or performed during the hospital encounter of 12/30/22   US Harshil Alexander organ? GALLBLADDER, LIVER    Narrative    RIGHT UPPER QUADRANT  ULTRASOUND    INDICATION: Jaundice, abdominal swelling    COMPARISON: CT dated 09/14/2022    Transabdominal imaging of the upper abdomen was performed. FINDINGS:   -LIVER: 15.9 cm. Normal echogenicity. No masses. -BILE DUCTS: No intrahepatic bile duct dilatation. CBD diameter = 5 mm. -GALLBLADDER: There is diffuse gallbladder wall thickening. Multiple stones are  also present. -PANCREAS: Not well visualized. -RIGHT KIDNEY: 10.9 cm.  4 cm cyst in the right kidney. No hydronephrosis. -ABDOMINAL AORTA AND IVC: Normal in size. -ASCITES: Mild perihepatic ascites. Impression    1. No evidence of bile duct obstruction. 2.  Cholelithiasis and gallbladder wall thickening. Gallbladder wall thickening  could be due to inflammation or adjacent liver disease. 3.  Ascites. XR CHEST (2 VW)    Narrative    Two view chest    History: chf, sob    Comparison: 01/23/2020    Findings: The heart is enlarged. There is moderate pulmonary vascular  congestion. There are no pleural effusions. The visualized osseous structures  are unremarkable. Impression    Cardiomegaly and pulmonary vascular congestion.          CBC with Auto Differential   Result Value Ref Range    WBC 6.6 4.3 - 11.1 K/uL    RBC 5.08 4.23 - 5.6 M/uL    Hemoglobin 16.5 13.6 - 17.2 g/dL    Hematocrit 50.4 (H) 41.1 - 50.3 %    MCV 99.2 82.0 - 102.0 FL    MCH 32.5 26.1 - 32.9 PG    MCHC 32.7 31.4 - 35.0 g/dL    RDW 15.4 (H) 11.9 - 14.6 %    Platelets 844 (L) 226 - 450 K/uL    MPV 10.2 9.4 - 12.3 FL    nRBC 0.00 0.0 - 0.2 K/uL    Differential Type AUTOMATED      Seg Neutrophils 75 43 - 78 %    Lymphocytes 17 13 - 44 %    Monocytes 7 4.0 - 12.0 %    Eosinophils % 0 (L) 0.5 - 7.8 %    Basophils 0 0.0 - 2.0 %    Immature Granulocytes 0 0.0 - 5.0 %    Segs Absolute 5.0 1.7 - 8.2 K/UL    Absolute Lymph # 1.1 0.5 - 4.6 K/UL    Absolute Mono # 0.5 0.1 - 1.3 K/UL    Absolute Eos # 0.0 0.0 - 0.8 K/UL    Basophils Absolute 0.0 0.0 - 0.2 K/UL    Absolute Immature Granulocyte 0.0 0.0 - 0.5 K/UL   Comprehensive Metabolic Panel   Result Value Ref Range    Sodium 137 133 - 143 mmol/L    Potassium 4.4 3.5 - 5.1 mmol/L    Chloride 101 101 - 110 mmol/L    CO2 26 21 - 32 mmol/L    Anion Gap 10 2 - 11 mmol/L    Glucose 105 (H) 65 - 100 mg/dL    BUN 33 (H) 8 - 23 MG/DL    Creatinine 2.43 (H) 0.8 - 1.5 MG/DL    Est, Glom Filt Rate 29 (L) >60 ml/min/1.73m2    Calcium 9.4 8.3 - 10.4 MG/DL    Total Bilirubin 5.3 (H) 0.2 - 1.1 MG/DL    ALT 76 (H) 12 - 65 U/L    AST 79 (H) 15 - 37 U/L    Alk Phosphatase 203 (H) 50 - 136 U/L    Total Protein 6.8 6.3 - 8.2 g/dL    Albumin 3.0 (L) 3.2 - 4.6 g/dL    Globulin 3.8 2.8 - 4.5 g/dL    Albumin/Globulin Ratio 0.8 0.4 - 1.6     Brain Natriuretic Peptide   Result Value Ref Range    NT Pro-BNP 26,455 (H) 5 - 125 PG/ML   Troponin   Result Value Ref Range    Troponin, High Sensitivity 178.2 (HH) 0 - 14 pg/mL   Troponin   Result Value Ref Range    Troponin, High Sensitivity 198.9 (HH) 0 - 14 pg/mL   EKG 12 Lead   Result Value Ref Range    Ventricular Rate 89 BPM    Atrial Rate 89 BPM    P-R Interval 230 ms    QRS Duration 88 ms    Q-T Interval 352 ms    QTc Calculation (Bazett) 428 ms    P Axis 60 degrees    R Axis -75 degrees    T Axis 55 degrees    Diagnosis       Sinus rhythm with 1st degree A-V block with Premature atrial complexes        US ABDOMEN LIMITED Specify organ? GALLBLADDER, LIVER   Final Result   1. No evidence of bile duct obstruction. 2.  Cholelithiasis and gallbladder wall thickening. Gallbladder wall thickening   could be due to inflammation or adjacent liver disease. 3.  Ascites. XR CHEST (2 VW)   Final Result   Cardiomegaly and pulmonary vascular congestion. Voice dictation software was used during the making of this note. This software is not perfect and grammatical and other typographical errors may be present. This note has not been completely proofread for errors.      Esthela Gotti  12/30/22 2039

## 2022-12-30 NOTE — PROGRESS NOTES
BNP almost 27,000. Creatinine 2.50. Recommended that patient present to the ER for further and more prompt evaluation and management. Keep follow-up appt with cardiology scheduled for 1/5/23.        Component Ref Range & Units 12/29/22 1032 9/14/22 1733   NT Pro-BNP 5 - 125 PG/ML 26,696 High   10,039 High         Lab Results   Component Value Date     12/29/2022    K 4.6 12/29/2022     (L) 12/29/2022    CO2 23 12/29/2022    BUN 30 (H) 12/29/2022    CREATININE 2.50 (H) 12/29/2022    GLUCOSE 67 12/29/2022    CALCIUM 9.8 12/29/2022    PROT 6.6 12/29/2022    LABALBU 3.3 12/29/2022    BILITOT 5.5 (H) 12/29/2022    ALKPHOS 202 (H) 12/29/2022    AST 77 (H) 12/29/2022    ALT 76 (H) 12/29/2022    LABGLOM 28 (L) 12/29/2022    GFRAA >60 09/14/2022    AGRATIO 0.9 (L) 05/05/2022    GLOB 3.3 12/29/2022

## 2022-12-30 NOTE — H&P
Hospitalist History and Physical   Admit Date:  2022  3:23 PM   Name:  Karina [facility]   Age:  78 y.o. Sex:  male  :  1955   MRN:  [id number]   Room:  HE/E    Presenting Complaint: Abnormal Lab     Reason(s) for Admission: Stage 2 acute kidney injury [facility]) [N17.9]     History of Present Illness:   Claudia Gilbert is a 78 y.o. male with medical history of CKD3a, HFrEF, interstitial lung disease who presented with worsening shortness of breath and swelling. He has had chronic swelling and shortness of breath intermittently for several months. His EF was 20-25% at last check in November. He was at a doctor's appointment  and labs were drawn. When they resulted his provider directed him to the emergency department for evaluation. He was found to have an acute kidney injury, volume overload, and elevation of transaminases. He was admitted to the ICU for further evaluation and management. Review of Systems:  10 systems reviewed and negative except as noted in HPI.   Assessment & Plan:   Stage 2 acute kidney injury in the setting of chronic kidney disease, stage 3a  Baseline GFR 45-50, sCr doubled since last check  -milrinone gtt for improved profusion  -albumin for edema mobilization  -consider nephrology consult    Chronic combined systolic and diastolic congestive heart failure with volume overload  Likely in a chronic state of mild exacerbation due to profound comorbid disease  -bumetanide gtt  -metoprolol  -mercado catheter  -hold ACE-I for BRYON  -consider cardiology consult  -jose hose  -limited echo    Demand ischemia of myocardium   No chest pain, ECG stable on admission  -trend  -serial ECG prn    Interstitial lung disease  -iitjrmh-nijnriycpr-wlbltcsoyp    Class 1 obesity due to excess calories with serious comorbidity and body mass index (BMI) of 34.0 to 34.9 in adult  Increased risk of all cause mortality, complicating care  - healthy lifestyle at discharge    Patient is critically ill. Without intervention, there is a high probability of acute organ impairment or life-threatening deterioration. Critical care interventions: see plan of care  Total critical care time spent: 37 minutes. Time is indicative of direct patient attendance at bedside and on the patient's floor nearby. Includes time spent at bedside performing history and exam, performing chart review, discussing findings and treatment plan with patient and/or family, discussing patient with nursing staff, consultants and colleagues, and ordering/reviewing pertinent laboratory and radiographic evaluations. Time excludes procedures. CPT:  77353: First 30-74 minutes  28809: Each block of 30 min. beyond 76            Anticipated discharge needs:     TBD    Diet: No diet orders on file  VTE ppx: heparin  Code status: Prior DNR    Hospital Problems:  Principal Problem:    Stage 2 acute kidney injury ([facility] [address]  Active Problems:    Demand ischemia of myocardium (HCC)    Chronic kidney disease, stage 3a ([facility] [address]    Chronic combined systolic and diastolic congestive heart failure (HCC)    ILD (interstitial lung disease) ([facility] [address]    Hyperlipidemia    Class 1 obesity due to excess calories with serious comorbidity and body mass index (BMI) of 34.0 to 34.9 in adult  Resolved Problems:    * No resolved hospital problems. *       Past History:     Past Medical History:   Diagnosis Date    Aortic regurgitation     mod-severe eccentric-  ECHO 5/5/22; per MD note 8/8/22 not controlled    Bronchiectasis ([facility] [address]     Cardiomyopathy ([facility] [address]     CHF (congestive heart failure) ([facility] [address]     ED (erectile dysfunction)     Edema     H/O coronary angiogram 05/05/2022    cath report: mild LM disease, severe MV, severe LV dysfunction, increased LVEDP.  MD note 8/8/22: Juan Darnell RCA ~90%, other anatomy with mild non-obstructive disease\"    History of transesophageal echocardiography ([facility]) 05/05/2022    EF 15-20%, left ventrical severely dilated, AV mod-severe eccentric regurgitation, MVR mild to moderate, RVSP 41 mmHg    Hyperlipidemia     Hypertension     medication    ILD (interstitial lung disease) ([facility] [address]     Ofev    Murmur     cardiac note 22: No definite PND/ orthopnea. Improved sx since initial eval on 22, down 15-20 pounds, states improved ROOT, persistent lower ext edema, states can walk over a flight of stairs. No chest discomfort. Denies dizziness.     Osteoarthritis of knees, bilateral     TIA (transient ischemic attack) 2022    Vitamin D deficiency        Past Surgical History:   Procedure Laterality Date    COLONOSCOPY  2019    Dr. Monica Molina, due for repeat in 5 years    HERNIA REPAIR Left 2022    INCARCERATED LEFT INGUINAL HERNIA REPAIR W/ MESH performed by Yoon Rosales MD at . [address] Left     left wrist, cyst        Social History     Tobacco Use    Smoking status: Former     Packs/day: 1.00     Years: 20.00     Pack years: 20.00     Types: Cigarettes     Quit date: 2014     Years since quittin.6    Smokeless tobacco: Never   Substance Use Topics    Alcohol use: Yes     Comment: rare      Social History     Substance and Sexual Activity   Drug Use No       Family History   Problem Relation Age of Onset    Breast Cancer Sister     Heart Disease Father     Coronary Art Dis Neg Hx         Immunization History   Administered Date(s) Administered    COVID-19, MODERNA BLUE border, Primary or Immunocompromised, (age 12y+), IM, 100 mcg/0.5mL 2021, 2021    COVID-19, MODERNA Booster BLUE border, (age 18y+), IM, 50mcg/0.25mL 2022    DTaP (Infanrix) 2009    Influenza Quadv 2014    Influenza Trivalent 10/10/2011, 2012    Influenza, FLUARIX, FLULAVAL, FLUZONE (age 10 mo+) AND AFLURIA, (age 1 y+), PF, 0.5mL 2013, 2015, 2018, 10/11/2019, 10/14/2020    Influenza, High Dose (Fluzone 65 yrs and older) 10/01/2021    PPD Test 2016    Tdap (Boostrix, Adacel) 01/30/2020     Allergies   Allergen Reactions    Penicillin G Shortness Of Breath     Prior to Admit Medications:  Current Outpatient Medications   Medication Instructions    aspirin 81 mg, Oral, DAILY    atorvastatin (LIPITOR) 80 mg, Oral, NIGHTLY    Budeson-Glycopyrrol-Formoterol (BREZTRI AEROSPHERE) 160-9-4.8 MCG/ACT AERO 2 puffs, Inhalation, 2 TIMES DAILY    bumetanide (BUMEX) 1 mg, Oral, 2 TIMES DAILY    cetirizine (ZYRTEC) 10 mg, Oral, DAILY    clopidogrel (PLAVIX) 75 mg, Oral, DAILY    metoprolol succinate (TOPROL XL) 25 mg, Oral, DAILY    Nintedanib Esylate (OFEV) 150 MG CAPS TAKE 1 CAPSULE BY MOUTH TWICE A DAY 12 HOURS APART WITH FOOD    spironolactone (ALDACTONE) 25 mg, Oral, DAILY         Objective:   Patient Vitals for the past 24 hrs:   Temp Pulse Resp BP SpO2   12/30/22 2004 97.5 °F (36.4 °C) 83 16 87/60 94 %   12/30/22 1551 -- -- -- 93/65 --   12/30/22 1409 -- -- 18 -- 97 %   12/30/22 1355 97.7 °F (36.5 °C) 88 -- 93/64 --       Oxygen Therapy  SpO2: 94 %  O2 Device: None (Room air)    Estimated body mass index is 34.72 kg/m² as calculated from the following:    Height as of this encounter: 6' (1.829 m). Weight as of this encounter: 256 lb (116.1 kg). No intake or output data in the 24 hours ending 12/30/22 2029      Blood pressure 87/60, pulse 83, temperature 97.5 °F (36.4 °C), temperature source Oral, resp. rate 16, height 6' (1.829 m), weight 256 lb (116.1 kg), SpO2 94 %. Physical Exam  Vitals and nursing note reviewed. Constitutional:       General: He is in acute distress. Appearance: He is ill-appearing. He is not diaphoretic. HENT:      Head: Normocephalic and atraumatic. Eyes:      Extraocular Movements: Extraocular movements intact. Cardiovascular:      Rate and Rhythm: Normal rate. Heart sounds: Murmur heard. Comments: Edema to mid abdomen  Pulmonary:      Effort: Respiratory distress (with speech) present. Breath sounds: Rales present. Comments: clubbing  Abdominal:      General: There is no distension. Musculoskeletal:         General: No deformity. Right lower leg: Edema present. Left lower leg: Edema present. Skin:     Coloration: Skin is not jaundiced or pale. Neurological:      General: No focal deficit present. Mental Status: He is alert and oriented to person, place, and time. Psychiatric:         Mood and Affect: Mood normal.         Behavior: Behavior normal. Behavior is cooperative.       Comments: pleasant         I have personally reviewed labs and tests showing:  Recent Labs:  Recent Results (from the past 24 hour(s))   CBC with Auto Differential    Collection Time: 12/30/22  2:18 PM   Result Value Ref Range    WBC 6.6 4.3 - 11.1 K/uL    RBC 5.08 4.23 - 5.6 M/uL    Hemoglobin 16.5 13.6 - 17.2 g/dL    Hematocrit 50.4 (H) 41.1 - 50.3 %    MCV 99.2 82.0 - 102.0 FL    MCH 32.5 26.1 - 32.9 PG    MCHC 32.7 31.4 - 35.0 g/dL    RDW 15.4 (H) 11.9 - 14.6 %    Platelets 264 (L) 009 - 450 K/uL    MPV 10.2 9.4 - 12.3 FL    nRBC 0.00 0.0 - 0.2 K/uL    Differential Type AUTOMATED      Seg Neutrophils 75 43 - 78 %    Lymphocytes 17 13 - 44 %    Monocytes 7 4.0 - 12.0 %    Eosinophils % 0 (L) 0.5 - 7.8 %    Basophils 0 0.0 - 2.0 %    Immature Granulocytes 0 0.0 - 5.0 %    Segs Absolute 5.0 1.7 - 8.2 K/UL    Absolute Lymph # 1.1 0.5 - 4.6 K/UL    Absolute Mono # 0.5 0.1 - 1.3 K/UL    Absolute Eos # 0.0 0.0 - 0.8 K/UL    Basophils Absolute 0.0 0.0 - 0.2 K/UL    Absolute Immature Granulocyte 0.0 0.0 - 0.5 K/UL   Comprehensive Metabolic Panel    Collection Time: 12/30/22  2:18 PM   Result Value Ref Range    Sodium 137 133 - 143 mmol/L    Potassium 4.4 3.5 - 5.1 mmol/L    Chloride 101 101 - 110 mmol/L    CO2 26 21 - 32 mmol/L    Anion Gap 10 2 - 11 mmol/L    Glucose 105 (H) 65 - 100 mg/dL    BUN 33 (H) 8 - 23 MG/DL    Creatinine 2.43 (H) 0.8 - 1.5 MG/DL    Est, Glom Filt Rate 29 (L) >60 ml/min/1.73m2    Calcium 9.4 8.3 - 10.4 MG/DL Total Bilirubin 5.3 (H) 0.2 - 1.1 MG/DL    ALT 76 (H) 12 - 65 U/L    AST 79 (H) 15 - 37 U/L    Alk Phosphatase 203 (H) 50 - 136 U/L    Total Protein 6.8 6.3 - 8.2 g/dL    Albumin 3.0 (L) 3.2 - 4.6 g/dL    Globulin 3.8 2.8 - 4.5 g/dL    Albumin/Globulin Ratio 0.8 0.4 - 1.6     Brain Natriuretic Peptide    Collection Time: 12/30/22  2:18 PM   Result Value Ref Range    NT Pro-BNP 26,455 (H) 5 - 125 PG/ML   Troponin    Collection Time: 12/30/22  2:18 PM   Result Value Ref Range    Troponin, High Sensitivity 178.2 (HH) 0 - 14 pg/mL   EKG 12 Lead    Collection Time: 12/30/22  2:18 PM   Result Value Ref Range    Ventricular Rate 89 BPM    Atrial Rate 89 BPM    P-R Interval 230 ms    QRS Duration 88 ms    Q-T Interval 352 ms    QTc Calculation (Bazett) 428 ms    P Axis 60 degrees    R Axis -75 degrees    T Axis 55 degrees    Diagnosis       Sinus rhythm with 1st degree A-V block with Premature atrial complexes   Troponin    Collection Time: 12/30/22  5:23 PM   Result Value Ref Range    Troponin, High Sensitivity 198.9 (HH) 0 - 14 pg/mL       I have personally reviewed imaging studies showing:  XR CHEST (2 VW)    Result Date: 12/30/2022  Two view chest History: chf, sob Comparison: 01/23/2020 Findings: The heart is enlarged. There is moderate pulmonary vascular congestion. There are no pleural effusions. The visualized osseous structures are unremarkable. Cardiomegaly and pulmonary vascular congestion. US ABDOMEN LIMITED Specify organ? GALLBLADDER, LIVER    Result Date: 12/30/2022  RIGHT UPPER QUADRANT  ULTRASOUND INDICATION: Jaundice, abdominal swelling COMPARISON: CT dated 09/14/2022 Transabdominal imaging of the upper abdomen was performed. FINDINGS: -LIVER: 15.9 cm. Normal echogenicity. No masses. -BILE DUCTS: No intrahepatic bile duct dilatation. CBD diameter = 5 mm. -GALLBLADDER: There is diffuse gallbladder wall thickening. Multiple stones are also present. -PANCREAS: Not well visualized.  -RIGHT KIDNEY: 10.9 cm.  4 cm cyst in the right kidney. No hydronephrosis. -ABDOMINAL AORTA AND IVC: Normal in size. -ASCITES: Mild perihepatic ascites. 1.  No evidence of bile duct obstruction. 2.  Cholelithiasis and gallbladder wall thickening. Gallbladder wall thickening could be due to inflammation or adjacent liver disease. 3.  Ascites. Echocardiogram:  11/19/22    TRANSTHORACIC ECHOCARDIOGRAM (TTE) COMPLETE (CONTRAST/BUBBLE/3D PRN) 11/21/2022 11:23 AM (Final)    Interpretation Summary    Left Ventricle: Severely reduced left ventricular systolic function with a visually estimated EF of 25 - 30%. Left ventricle is severely dilated. Mildly increased wall thickness. Severe global hypokinesis present. Abnormal diastolic function. Aortic Valve: Mild regurgitation. Mitral Valve: Moderate regurgitation. Tricuspid Valve: Moderate regurgitation. Moderately elevated RVSP. The estimated RVSP is 43 mmHg. Left Atrium: Left atrium is severely dilated. Aorta: Mildly dilated aortic root. Contrast used: Definity. Signed by: Myles Merchant MD on 11/21/2022 11:23 AM        No orders of the defined types were placed in this encounter.         Signed:  Jeris Ganser, MD

## 2022-12-31 ENCOUNTER — APPOINTMENT (OUTPATIENT)
Dept: NON INVASIVE DIAGNOSTICS | Age: 67
DRG: 682 | End: 2022-12-31
Payer: MEDICARE

## 2022-12-31 LAB
ALBUMIN SERPL-MCNC: 3 G/DL (ref 3.2–4.6)
ALBUMIN/GLOB SERPL: 1 {RATIO} (ref 0.4–1.6)
ALP SERPL-CCNC: 183 U/L (ref 50–136)
ALT SERPL-CCNC: 65 U/L (ref 12–65)
ANION GAP SERPL CALC-SCNC: 9 MMOL/L (ref 2–11)
AST SERPL-CCNC: 67 U/L (ref 15–37)
BILIRUB DIRECT SERPL-MCNC: 3.2 MG/DL
BILIRUB SERPL-MCNC: 4.8 MG/DL (ref 0.2–1.1)
BUN SERPL-MCNC: 36 MG/DL (ref 8–23)
CALCIUM SERPL-MCNC: 9.7 MG/DL (ref 8.3–10.4)
CHLORIDE SERPL-SCNC: 102 MMOL/L (ref 101–110)
CHOLEST SERPL-MCNC: 66 MG/DL
CO2 SERPL-SCNC: 23 MMOL/L (ref 21–32)
CREAT SERPL-MCNC: 2.14 MG/DL (ref 0.8–1.5)
ECHO AR MAX VEL PISA: 3.8 M/S
ECHO AV MEAN GRADIENT: 11 MMHG
ECHO AV MEAN VELOCITY: 1.6 M/S
ECHO AV PEAK GRADIENT: 17 MMHG
ECHO AV PEAK VELOCITY: 2.1 M/S
ECHO AV REGURGITANT PHT: 371 MS
ECHO AV VTI: 40.8 CM
ECHO BSA: 2.4 M2
ECHO IVC EXP: 2.6 CM
ECHO LA DIAMETER INDEX: 1.84 CM/M2
ECHO LA DIAMETER: 4.3 CM
ECHO LV EDV A2C: 175 ML
ECHO LV EDV A4C: 231 ML
ECHO LV EDV INDEX A4C: 99 ML/M2
ECHO LV EDV NDEX A2C: 75 ML/M2
ECHO LV EJECTION FRACTION A2C: 24 %
ECHO LV EJECTION FRACTION A4C: 22 %
ECHO LV EJECTION FRACTION BIPLANE: 21 % (ref 55–100)
ECHO LV ESV A2C: 133 ML
ECHO LV ESV A4C: 182 ML
ECHO LV ESV INDEX A2C: 57 ML/M2
ECHO LV ESV INDEX A4C: 78 ML/M2
ECHO LV FRACTIONAL SHORTENING: 7 % (ref 28–44)
ECHO LV INTERNAL DIMENSION DIASTOLE INDEX: 2.86 CM/M2
ECHO LV INTERNAL DIMENSION DIASTOLIC: 6.7 CM (ref 4.2–5.9)
ECHO LV INTERNAL DIMENSION SYSTOLIC INDEX: 2.65 CM/M2
ECHO LV INTERNAL DIMENSION SYSTOLIC: 6.2 CM
ECHO LV IVSD: 0.8 CM (ref 0.6–1)
ECHO LV MASS 2D: 243.5 G (ref 88–224)
ECHO LV MASS INDEX 2D: 104.1 G/M2 (ref 49–115)
ECHO LV POSTERIOR WALL DIASTOLIC: 0.9 CM (ref 0.6–1)
ECHO LV RELATIVE WALL THICKNESS RATIO: 0.27
EKG ATRIAL RATE: 89 BPM
EKG DIAGNOSIS: NORMAL
EKG P AXIS: 60 DEGREES
EKG P-R INTERVAL: 230 MS
EKG Q-T INTERVAL: 352 MS
EKG QRS DURATION: 88 MS
EKG QTC CALCULATION (BAZETT): 428 MS
EKG R AXIS: -75 DEGREES
EKG T AXIS: 55 DEGREES
EKG VENTRICULAR RATE: 89 BPM
ERYTHROCYTE [DISTWIDTH] IN BLOOD BY AUTOMATED COUNT: 15.3 % (ref 11.9–14.6)
EST. AVERAGE GLUCOSE BLD GHB EST-MCNC: 134 MG/DL
GLOBULIN SER CALC-MCNC: 3 G/DL (ref 2.8–4.5)
GLUCOSE SERPL-MCNC: 100 MG/DL (ref 65–100)
HBA1C MFR BLD: 6.3 % (ref 4.8–5.6)
HCT VFR BLD AUTO: 43.5 % (ref 41.1–50.3)
HDLC SERPL-MCNC: 19 MG/DL (ref 40–60)
HDLC SERPL: 3.5 {RATIO}
HGB BLD-MCNC: 14.5 G/DL (ref 13.6–17.2)
LDLC SERPL CALC-MCNC: 31.4 MG/DL
MAGNESIUM SERPL-MCNC: 2.4 MG/DL (ref 1.8–2.4)
MCH RBC QN AUTO: 32.2 PG (ref 26.1–32.9)
MCHC RBC AUTO-ENTMCNC: 33.3 G/DL (ref 31.4–35)
MCV RBC AUTO: 96.7 FL (ref 82–102)
NRBC # BLD: 0 K/UL (ref 0–0.2)
PHOSPHATE SERPL-MCNC: 2.9 MG/DL (ref 2.3–3.7)
PLATELET # BLD AUTO: 93 K/UL (ref 150–450)
PMV BLD AUTO: 10 FL (ref 9.4–12.3)
POTASSIUM SERPL-SCNC: 4 MMOL/L (ref 3.5–5.1)
PROCALCITONIN SERPL-MCNC: 0.41 NG/ML (ref 0–0.49)
PROT SERPL-MCNC: 6 G/DL (ref 6.3–8.2)
RBC # BLD AUTO: 4.5 M/UL (ref 4.23–5.6)
SODIUM SERPL-SCNC: 134 MMOL/L (ref 133–143)
TRIGL SERPL-MCNC: 78 MG/DL (ref 35–150)
TSH W FREE THYROID IF ABNORMAL: 2.24 UIU/ML (ref 0.36–3.74)
VLDLC SERPL CALC-MCNC: 15.6 MG/DL (ref 6–23)
WBC # BLD AUTO: 6.6 K/UL (ref 4.3–11.1)

## 2022-12-31 PROCEDURE — 6370000000 HC RX 637 (ALT 250 FOR IP): Performed by: FAMILY MEDICINE

## 2022-12-31 PROCEDURE — 93325 DOPPLER ECHO COLOR FLOW MAPG: CPT | Performed by: INTERNAL MEDICINE

## 2022-12-31 PROCEDURE — 93321 DOPPLER ECHO F-UP/LMTD STD: CPT

## 2022-12-31 PROCEDURE — 80076 HEPATIC FUNCTION PANEL: CPT

## 2022-12-31 PROCEDURE — 80061 LIPID PANEL: CPT

## 2022-12-31 PROCEDURE — 2580000003 HC RX 258: Performed by: FAMILY MEDICINE

## 2022-12-31 PROCEDURE — 84443 ASSAY THYROID STIM HORMONE: CPT

## 2022-12-31 PROCEDURE — 6360000004 HC RX CONTRAST MEDICATION: Performed by: FAMILY MEDICINE

## 2022-12-31 PROCEDURE — A4216 STERILE WATER/SALINE, 10 ML: HCPCS | Performed by: FAMILY MEDICINE

## 2022-12-31 PROCEDURE — 93321 DOPPLER ECHO F-UP/LMTD STD: CPT | Performed by: INTERNAL MEDICINE

## 2022-12-31 PROCEDURE — 83735 ASSAY OF MAGNESIUM: CPT

## 2022-12-31 PROCEDURE — 84100 ASSAY OF PHOSPHORUS: CPT

## 2022-12-31 PROCEDURE — 94761 N-INVAS EAR/PLS OXIMETRY MLT: CPT

## 2022-12-31 PROCEDURE — 83036 HEMOGLOBIN GLYCOSYLATED A1C: CPT

## 2022-12-31 PROCEDURE — 93308 TTE F-UP OR LMTD: CPT | Performed by: INTERNAL MEDICINE

## 2022-12-31 PROCEDURE — 36415 COLL VENOUS BLD VENIPUNCTURE: CPT

## 2022-12-31 PROCEDURE — 2700000000 HC OXYGEN THERAPY PER DAY

## 2022-12-31 PROCEDURE — P9047 ALBUMIN (HUMAN), 25%, 50ML: HCPCS | Performed by: FAMILY MEDICINE

## 2022-12-31 PROCEDURE — 85027 COMPLETE CBC AUTOMATED: CPT

## 2022-12-31 PROCEDURE — 80048 BASIC METABOLIC PNL TOTAL CA: CPT

## 2022-12-31 PROCEDURE — 6360000002 HC RX W HCPCS: Performed by: FAMILY MEDICINE

## 2022-12-31 PROCEDURE — 2000000000 HC ICU R&B

## 2022-12-31 PROCEDURE — 84145 PROCALCITONIN (PCT): CPT

## 2022-12-31 RX ORDER — NYSTATIN 100000 [USP'U]/G
POWDER TOPICAL 2 TIMES DAILY
Status: DISCONTINUED | OUTPATIENT
Start: 2022-12-31 | End: 2023-01-03 | Stop reason: HOSPADM

## 2022-12-31 RX ORDER — LANOLIN
CREAM (ML) TOPICAL PRN
Status: DISCONTINUED | OUTPATIENT
Start: 2022-12-31 | End: 2023-01-03 | Stop reason: HOSPADM

## 2022-12-31 RX ADMIN — ATORVASTATIN CALCIUM 80 MG: 40 TABLET, FILM COATED ORAL at 21:46

## 2022-12-31 RX ADMIN — HEPARIN SODIUM 5000 UNITS: 5000 INJECTION INTRAVENOUS; SUBCUTANEOUS at 21:47

## 2022-12-31 RX ADMIN — PERFLUTREN 0.3 ML: 6.52 INJECTION, SUSPENSION INTRAVENOUS at 08:37

## 2022-12-31 RX ADMIN — SPIRONOLACTONE 25 MG: 25 TABLET ORAL at 08:55

## 2022-12-31 RX ADMIN — ACETAMINOPHEN 650 MG: 325 TABLET, FILM COATED ORAL at 21:55

## 2022-12-31 RX ADMIN — ASPIRIN 81 MG: 81 TABLET, COATED ORAL at 08:55

## 2022-12-31 RX ADMIN — METOPROLOL SUCCINATE 25 MG: 25 TABLET, EXTENDED RELEASE ORAL at 08:55

## 2022-12-31 RX ADMIN — SODIUM CHLORIDE, PRESERVATIVE FREE 5 ML: 5 INJECTION INTRAVENOUS at 21:46

## 2022-12-31 RX ADMIN — HEPARIN SODIUM 5000 UNITS: 5000 INJECTION INTRAVENOUS; SUBCUTANEOUS at 18:18

## 2022-12-31 RX ADMIN — ALBUMIN (HUMAN) 25 G: 0.25 INJECTION, SOLUTION INTRAVENOUS at 12:25

## 2022-12-31 RX ADMIN — ALBUMIN (HUMAN) 25 G: 0.25 INJECTION, SOLUTION INTRAVENOUS at 06:19

## 2022-12-31 RX ADMIN — HEPARIN SODIUM 5000 UNITS: 5000 INJECTION INTRAVENOUS; SUBCUTANEOUS at 06:24

## 2022-12-31 RX ADMIN — SODIUM CHLORIDE, PRESERVATIVE FREE 10 ML: 5 INJECTION INTRAVENOUS at 10:36

## 2022-12-31 RX ADMIN — NYSTATIN: 100000 POWDER TOPICAL at 08:59

## 2022-12-31 RX ADMIN — NYSTATIN: 100000 POWDER TOPICAL at 21:46

## 2022-12-31 ASSESSMENT — PAIN - FUNCTIONAL ASSESSMENT: PAIN_FUNCTIONAL_ASSESSMENT: PREVENTS OR INTERFERES SOME ACTIVE ACTIVITIES AND ADLS

## 2022-12-31 ASSESSMENT — PAIN DESCRIPTION - LOCATION: LOCATION: BACK

## 2022-12-31 ASSESSMENT — LIFESTYLE VARIABLES
HOW OFTEN DO YOU HAVE A DRINK CONTAINING ALCOHOL: MONTHLY OR LESS
HOW MANY STANDARD DRINKS CONTAINING ALCOHOL DO YOU HAVE ON A TYPICAL DAY: 1 OR 2

## 2022-12-31 ASSESSMENT — PAIN SCALES - GENERAL
PAINLEVEL_OUTOF10: 6
PAINLEVEL_OUTOF10: 0

## 2022-12-31 NOTE — PROGRESS NOTES
Hospitalist Progress Note   Admit Date:  2022  3:23 PM   Name:  [facility]   Age:  78 y.o. Sex:  male  :  1955   MRN:  [id number]   Room:  [facility]    Presenting Complaint: Abnormal Lab     Reason(s) for Admission: Elevated troponin [R77.8]  Acute on chronic systolic congestive heart failure (HCC) [I50.23]  Acute renal failure, unspecified acute renal failure type ([facility] [address] [N17.9]  Stage 2 acute kidney injury ([facility] [address] [N17.9]  Congestive heart failure, unspecified HF chronicity, unspecified heart failure type ([facility] [address] [I50.9]     Hospital Course:   78 y.o. male with medical history of CKD3a, HFrEF, interstitial lung disease who presented with worsening shortness of breath and swelling. He has had chronic swelling and shortness of breath intermittently for several months. His EF was 20-25% at last check in November. He was at a doctor's appointment  and labs were drawn. When they resulted his provider directed him to the emergency department for evaluation. He was found to have an acute kidney injury, volume overload, and elevation of transaminases. He was admitted to the ICU for further evaluation and management. Subjective & 24hr Events (22): Has improved significantly overnight. Good urine output and improvement in renal function. Breathing more comfortable, remains on oxygen. In good spirits with no new acute complaints. Discussed with family including niece who is a nurse at [employer].   Consulting cardiology given echocardiogram results      Assessment & Plan:   Stage 2 acute kidney injury in the setting of chronic kidney disease, stage 3a  Baseline GFR 45-50, sCr doubled since last check  -milrinone gtt for improved profusion  -albumin for edema mobilization  2022: Some improvement, continue therapy and closely monitor renal function     Chronic combined systolic and diastolic congestive heart failure with volume overload  Likely in a chronic state of mild exacerbation due to profound comorbid disease. Echocardiogram with reduced ejection fraction from prior  -bumetanide gtt  -metoprolol  -mercado catheter  -hold ACE-I for BRYON  -Consult cardiology   -jose hose  12/31/2022: Responding well, echocardiogram concerning for decreased pulm function. Consult cardiology     Demand ischemia of myocardium   No chest pain, ECG stable on admission  -trend  -serial ECG prn  12/31/2022: Appears stable     Interstitial lung disease  -[id number]-qepongkugm     Class 1 obesity due to excess calories with serious comorbidity and body mass index (BMI) of 34.0 to 34.9 in adult  Increased risk of all cause mortality, complicating care  - healthy lifestyle at discharge     Patient is critically ill. Without intervention, there is a high probability of acute organ impairment or life-threatening deterioration. Critical care interventions: see plan of care  Total critical care time spent: 34 minutes. Time is indicative of direct patient attendance at bedside and on the patient's floor nearby. Includes time spent at bedside performing history and exam, performing chart review, discussing findings and treatment plan with patient and/or family, discussing patient with nursing staff, consultants and colleagues, and ordering/reviewing pertinent laboratory and radiographic evaluations. Time excludes procedures. CPT:  86633: First 30-74 minutes  23299: Each block of 30 min. beyond 76         Anticipated discharge needs: To be determined    Diet:  ADULT DIET; Regular;  No Added Salt (3-4 gm)  DVT PPx: Heparin  Code status: DNR    Hospital Problems:  Principal Problem:    Stage 2 acute kidney injury ([facility] [address]  Active Problems:    Demand ischemia of myocardium (HCC)    Chronic kidney disease, stage 3a (HCC)    Chronic combined systolic and diastolic congestive heart failure (HCC)    ILD (interstitial lung disease) ([address]    Hyperlipidemia    Class 1 obesity due to excess calories with serious comorbidity and body mass index (BMI) of 34.0 to 34.9 in adult  Resolved Problems:    * No resolved hospital problems. *      Objective:   Patient Vitals for the past 24 hrs:   Temp Pulse Resp BP SpO2   12/31/22 0742 -- 85 15 -- --   12/31/22 0717 97.5 °F (36.4 °C) 84 18 97/78 94 %   12/31/22 0615 97 °F (36.1 °C) 85 12 101/76 --   12/31/22 0600 -- 83 16 99/68 --   12/31/22 0545 -- 86 14 94/69 --   12/31/22 0530 -- 83 14 97/71 --   12/31/22 0515 -- 82 16 97/69 --   12/31/22 0500 -- 83 16 97/72 --   12/31/22 0445 -- 81 20 98/71 --   12/31/22 0430 -- 82 16 95/68 --   12/31/22 0415 -- 83 14 99/68 --   12/31/22 0400 -- 83 14 102/69 --   12/31/22 0345 -- 83 16 99/71 --   12/31/22 0330 -- 83 21 100/67 --   12/31/22 0315 -- 83 16 100/68 --   12/31/22 0300 -- 84 15 99/70 --   12/31/22 0245 -- 85 18 103/69 --   12/31/22 0230 -- 83 17 98/75 --   12/31/22 0215 -- 86 29 94/71 --   12/31/22 0145 -- 86 27 99/70 97 %   12/31/22 0130 -- 86 13 103/68 --   12/31/22 0115 -- 84 14 98/73 --   12/31/22 0100 -- 85 16 96/70 --   12/31/22 0045 -- 82 18 94/69 --   12/31/22 0030 -- 83 20 96/71 --   12/31/22 0015 -- 83 27 97/73 --   12/31/22 0000 -- 84 (!) 0 99/70 --   12/30/22 2345 -- 83 (!) 31 98/71 --   12/30/22 2334 96.8 °F (36 °C) 85 12 98/76 --   12/30/22 2240 97.8 °F (36.6 °C) 82 16 102/72 97 %   12/30/22 2004 97.5 °F (36.4 °C) 83 16 87/60 94 %   12/30/22 1551 -- -- -- 93/65 --   12/30/22 1409 -- -- 18 -- 97 %   12/30/22 1355 97.7 °F (36.5 °C) 88 -- 93/64 --       Oxygen Therapy  SpO2: 94 %  Pulse via Oximetry: 85 beats per minute  Pulse Oximeter Device Mode: Continuous  O2 Device: Nasal cannula  O2 Flow Rate (L/min): 2 L/min    Estimated body mass index is 33.95 kg/m² as calculated from the following:    Height as of this encounter: 6' (1.829 m). Weight as of this encounter: 250 lb 4.8 oz (113.5 kg).     Intake/Output Summary (Last 24 hours) at 12/31/2022 0756  Last data filed at 12/31/2022 0154  Gross per 24 hour Intake 200 ml   Output 500 ml   Net -300 ml       Blood pressure 97/78, pulse 85, temperature 97.5 °F (36.4 °C), temperature source Oral, resp. rate 15, height 6' (1.829 m), weight 250 lb 4.8 oz (113.5 kg), SpO2 94 %. Physical Exam  Vitals and nursing note reviewed. Constitutional:       General: He is not in acute distress. Appearance: He is obese. He is ill-appearing. He is not diaphoretic. HENT:      Head: Normocephalic and atraumatic. Eyes:      Extraocular Movements: Extraocular movements intact. Cardiovascular:      Rate and Rhythm: Normal rate. Heart sounds: Murmur heard. Comments: Edema to mid abdomen  Pulmonary:      Effort: Respiratory distress (mild) present. Breath sounds: No rales. Comments: clubbing  Abdominal:      General: There is no distension. Musculoskeletal:         General: No deformity. Right lower leg: Edema present. Left lower leg: Edema present. Skin:     Coloration: Skin is not jaundiced or pale. Neurological:      General: No focal deficit present. Mental Status: He is alert and oriented to person, place, and time. Psychiatric:         Mood and Affect: Mood normal.         Behavior: Behavior normal. Behavior is cooperative.       Comments: pleasant         I have personally reviewed labs and tests showing:  Recent Labs:  Recent Results (from the past 48 hour(s))   Comprehensive Metabolic Panel    Collection Time: 12/29/22 10:32 AM   Result Value Ref Range    Sodium 136 133 - 143 mmol/L    Potassium 4.6 3.5 - 5.1 mmol/L    Chloride 100 (L) 101 - 110 mmol/L    CO2 23 21 - 32 mmol/L    Anion Gap 13 (H) 2 - 11 mmol/L    Glucose 67 65 - 100 mg/dL    BUN 30 (H) 8 - 23 MG/DL    Creatinine 2.50 (H) 0.8 - 1.5 MG/DL    Est, Glom Filt Rate 28 (L) >60 ml/min/1.73m2    Calcium 9.8 8.3 - 10.4 MG/DL    Total Bilirubin 5.5 (H) 0.2 - 1.1 MG/DL    ALT 76 (H) 12 - 65 U/L    AST 77 (H) 15 - 37 U/L    Alk Phosphatase 202 (H) 50 - 136 U/L    Total Protein 6.6 6.3 - 8.2 g/dL    Albumin 3.3 3.2 - 4.6 g/dL    Globulin 3.3 2.8 - 4.5 g/dL    Albumin/Globulin Ratio 1.0 0.4 - 1.6     CBC with Auto Differential    Collection Time: 12/29/22 10:32 AM   Result Value Ref Range    WBC 6.5 4.3 - 11.1 K/uL    RBC 5.10 4.23 - 5.6 M/uL    Hemoglobin 16.6 13.6 - 17.2 g/dL    Hematocrit 50.6 (H) 41.1 - 50.3 %    MCV 99.2 82 - 102 FL    MCH 32.5 26.1 - 32.9 PG    MCHC 32.8 31.4 - 35.0 g/dL    RDW 15.5 (H) 11.9 - 14.6 %    Platelets 95 (L) 252 - 450 K/uL    MPV 11.7 9.4 - 12.3 FL    nRBC 0.00 0.0 - 0.2 K/uL    Differential Type AUTOMATED      Seg Neutrophils 73 43 - 78 %    Lymphocytes 19 13 - 44 %    Monocytes 8 4.0 - 12.0 %    Eosinophils % 0 (L) 0.5 - 7.8 %    Basophils 0 0.0 - 2.0 %    Immature Granulocytes 0 0.0 - 5.0 %    Segs Absolute 4.7 1.7 - 8.2 K/UL    Absolute Lymph # 1.2 0.5 - 4.6 K/UL    Absolute Mono # 0.5 0.1 - 1.3 K/UL    Absolute Eos # 0.0 0.0 - 0.8 K/UL    Basophils Absolute 0.0 0.0 - 0.2 K/UL    Absolute Immature Granulocyte 0.0 0.0 - 0.5 K/UL   Brain Natriuretic Peptide    Collection Time: 12/29/22 10:32 AM   Result Value Ref Range    NT Pro-BNP 26,696 (H) 5 - 125 PG/ML   CBC with Auto Differential    Collection Time: 12/30/22  2:18 PM   Result Value Ref Range    WBC 6.6 4.3 - 11.1 K/uL    RBC 5.08 4.23 - 5.6 M/uL    Hemoglobin 16.5 13.6 - 17.2 g/dL    Hematocrit 50.4 (H) 41.1 - 50.3 %    MCV 99.2 82.0 - 102.0 FL    MCH 32.5 26.1 - 32.9 PG    MCHC 32.7 31.4 - 35.0 g/dL    RDW 15.4 (H) 11.9 - 14.6 %    Platelets 640 (L) 648 - 450 K/uL    MPV 10.2 9.4 - 12.3 FL    nRBC 0.00 0.0 - 0.2 K/uL    Differential Type AUTOMATED      Seg Neutrophils 75 43 - 78 %    Lymphocytes 17 13 - 44 %    Monocytes 7 4.0 - 12.0 %    Eosinophils % 0 (L) 0.5 - 7.8 %    Basophils 0 0.0 - 2.0 %    Immature Granulocytes 0 0.0 - 5.0 %    Segs Absolute 5.0 1.7 - 8.2 K/UL    Absolute Lymph # 1.1 0.5 - 4.6 K/UL    Absolute Mono # 0.5 0.1 - 1.3 K/UL    Absolute Eos # 0.0 0.0 - 0.8 K/UL Basophils Absolute 0.0 0.0 - 0.2 K/UL    Absolute Immature Granulocyte 0.0 0.0 - 0.5 K/UL   Comprehensive Metabolic Panel    Collection Time: 12/30/22  2:18 PM   Result Value Ref Range    Sodium 137 133 - 143 mmol/L    Potassium 4.4 3.5 - 5.1 mmol/L    Chloride 101 101 - 110 mmol/L    CO2 26 21 - 32 mmol/L    Anion Gap 10 2 - 11 mmol/L    Glucose 105 (H) 65 - 100 mg/dL    BUN 33 (H) 8 - 23 MG/DL    Creatinine 2.43 (H) 0.8 - 1.5 MG/DL    Est, Glom Filt Rate 29 (L) >60 ml/min/1.73m2    Calcium 9.4 8.3 - 10.4 MG/DL    Total Bilirubin 5.3 (H) 0.2 - 1.1 MG/DL    ALT 76 (H) 12 - 65 U/L    AST 79 (H) 15 - 37 U/L    Alk Phosphatase 203 (H) 50 - 136 U/L    Total Protein 6.8 6.3 - 8.2 g/dL    Albumin 3.0 (L) 3.2 - 4.6 g/dL    Globulin 3.8 2.8 - 4.5 g/dL    Albumin/Globulin Ratio 0.8 0.4 - 1.6     Brain Natriuretic Peptide    Collection Time: 12/30/22  2:18 PM   Result Value Ref Range    NT Pro-BNP 26,455 (H) 5 - 125 PG/ML   Troponin    Collection Time: 12/30/22  2:18 PM   Result Value Ref Range    Troponin, High Sensitivity 178.2 (HH) 0 - 14 pg/mL   EKG 12 Lead    Collection Time: 12/30/22  2:18 PM   Result Value Ref Range    Ventricular Rate 89 BPM    Atrial Rate 89 BPM    P-R Interval 230 ms    QRS Duration 88 ms    Q-T Interval 352 ms    QTc Calculation (Bazett) 428 ms    P Axis 60 degrees    R Axis -75 degrees    T Axis 55 degrees    Diagnosis       Sinus rhythm with 1st degree A-V block with Premature atrial complexes   Troponin    Collection Time: 12/30/22  5:23 PM   Result Value Ref Range    Troponin, High Sensitivity 198.9 (HH) 0 - 14 pg/mL   Basic Metabolic Panel    Collection Time: 12/31/22  4:20 AM   Result Value Ref Range    Sodium 134 133 - 143 mmol/L    Potassium 4.0 3.5 - 5.1 mmol/L    Chloride 102 101 - 110 mmol/L    CO2 23 21 - 32 mmol/L    Anion Gap 9 2 - 11 mmol/L    Glucose 100 65 - 100 mg/dL    BUN 36 (H) 8 - 23 MG/DL    Creatinine 2.14 (H) 0.8 - 1.5 MG/DL    Est, Glom Filt Rate 33 (L) >60 ml/min/1.73m2 Calcium 9.7 8.3 - 10.4 MG/DL   Lipid Panel    Collection Time: 12/31/22  4:20 AM   Result Value Ref Range    Cholesterol, Total 66 <200 MG/DL    Triglycerides 78 35 - 150 MG/DL    HDL 19 (L) 40 - 60 MG/DL    LDL Calculated 31.4 <100 MG/DL    VLDL Cholesterol Calculated 15.6 6.0 - 23.0 MG/DL    Chol/HDL Ratio 3.5     Phosphorus    Collection Time: 12/31/22  4:20 AM   Result Value Ref Range    Phosphorus 2.9 2.3 - 3.7 MG/DL   CBC    Collection Time: 12/31/22  4:20 AM   Result Value Ref Range    WBC 6.6 4.3 - 11.1 K/uL    RBC 4.50 4.23 - 5.6 M/uL    Hemoglobin 14.5 13.6 - 17.2 g/dL    Hematocrit 43.5 41.1 - 50.3 %    MCV 96.7 82.0 - 102.0 FL    MCH 32.2 26.1 - 32.9 PG    MCHC 33.3 31.4 - 35.0 g/dL    RDW 15.3 (H) 11.9 - 14.6 %    Platelets 93 (L) 090 - 450 K/uL    MPV 10.0 9.4 - 12.3 FL    nRBC 0.00 0.0 - 0.2 K/uL   TSH with Reflex    Collection Time: 12/31/22  4:20 AM   Result Value Ref Range    TSH w Free Thyroid if Abnormal 2.24 0.358 - 3.740 UIU/ML   Hemoglobin A1C    Collection Time: 12/31/22  4:20 AM   Result Value Ref Range    Hemoglobin A1C 6.3 (H) 4.8 - 5.6 %    eAG 134 mg/dL   Procalcitonin    Collection Time: 12/31/22  4:20 AM   Result Value Ref Range    Procalcitonin 0.41 0.00 - 0.49 ng/mL   Hepatic Function Panel    Collection Time: 12/31/22  4:20 AM   Result Value Ref Range    Total Protein 6.0 (L) 6.3 - 8.2 g/dL    Albumin 3.0 (L) 3.2 - 4.6 g/dL    Globulin 3.0 2.8 - 4.5 g/dL    Albumin/Globulin Ratio 1.0 0.4 - 1.6      Total Bilirubin 4.8 (H) 0.2 - 1.1 MG/DL    Bilirubin, Direct 3.2 (H) <0.4 MG/DL    Alk Phosphatase 183 (H) 50 - 136 U/L    AST 67 (H) 15 - 37 U/L    ALT 65 12 - 65 U/L   Magnesium    Collection Time: 12/31/22  4:20 AM   Result Value Ref Range    Magnesium 2.4 1.8 - 2.4 mg/dL       I have personally reviewed imaging studies showing: Other Studies:  US ABDOMEN LIMITED Specify organ? GALLBLADDER, LIVER   Final Result   1. No evidence of bile duct obstruction.    2.  Cholelithiasis and gallbladder wall thickening. Gallbladder wall thickening   could be due to inflammation or adjacent liver disease. 3.  Ascites. XR CHEST (2 VW)   Final Result   Cardiomegaly and pulmonary vascular congestion.                    Current Meds:  Current Facility-Administered Medications   Medication Dose Route Frequency    nystatin (MYCOSTATIN) powder   Topical BID    aspirin EC tablet 81 mg  81 mg Oral Daily    atorvastatin (LIPITOR) tablet 80 mg  80 mg Oral Nightly    metoprolol succinate (TOPROL XL) extended release tablet 25 mg  25 mg Oral Daily    spironolactone (ALDACTONE) tablet 25 mg  25 mg Oral Daily    sodium chloride flush 0.9 % injection 5-40 mL  5-40 mL IntraVENous 2 times per day    sodium chloride flush 0.9 % injection 5-40 mL  5-40 mL IntraVENous PRN    0.9 % sodium chloride infusion   IntraVENous PRN    ondansetron (ZOFRAN-ODT) disintegrating tablet 4 mg  4 mg Oral Q8H PRN    Or    ondansetron (ZOFRAN) injection 4 mg  4 mg IntraVENous Q6H PRN    polyethylene glycol (GLYCOLAX) packet 17 g  17 g Oral Daily PRN    acetaminophen (TYLENOL) tablet 650 mg  650 mg Oral Q6H PRN    Or    acetaminophen (TYLENOL) suppository 650 mg  650 mg Rectal Q6H PRN    albumin human 25 % IV solution 25 g  25 g IntraVENous Q6H    bumetanide (BUMEX) 12.5 mg in 50 mL infusion  0.2 mg/hr IntraVENous Continuous    potassium chloride (KLOR-CON M) extended release tablet 40 mEq  40 mEq Oral PRN    Or    potassium bicarb-citric acid (EFFER-K) effervescent tablet 40 mEq  40 mEq Oral PRN    Or    potassium chloride 10 mEq/100 mL IVPB (Peripheral Line)  10 mEq IntraVENous PRN    magnesium sulfate 2000 mg in 50 mL IVPB premix  2,000 mg IntraVENous PRN    sodium phosphate 10 mmol in sodium chloride 0.9 % 250 mL IVPB  10 mmol IntraVENous PRN    Or    sodium phosphate 15 mmol in sodium chloride 0.9 % 250 mL IVPB  15 mmol IntraVENous PRN    Or    sodium phosphate 20 mmol in sodium chloride 0.9 % 500 mL IVPB  20 mmol IntraVENous PRN heparin (porcine) injection 5,000 Units  5,000 Units SubCUTAneous 3 times per day    DOPamine (INTROPIN) 800 mg in dextrose 5 % 250 mL infusion  1-20 mcg/kg/min IntraVENous Continuous    mometasone-formoterol (DULERA) 200-5 MCG/ACT inhaler 2 puff  2 puff Inhalation BID    And    tiotropium (SPIRIVA RESPIMAT) 2.5 MCG/ACT inhaler 2 puff  2 puff Inhalation Daily       Signed:  Imani Sarmiento MD  =

## 2022-12-31 NOTE — PLAN OF CARE
Problem: Discharge Planning  Goal: Discharge to home or other facility with appropriate resources  Outcome: Progressing  Flowsheets (Taken 12/31/2022 0007 by Berhane Mcneal RN)  Discharge to home or other facility with appropriate resources:   Refer to discharge planning if patient needs post-hospital services based on physician order or complex needs related to functional status, cognitive ability or social support system   Arrange for needed discharge resources and transportation as appropriate     Problem: Safety - Adult  Goal: Free from fall injury  Outcome: Progressing     Problem: ABCDS Injury Assessment  Goal: Absence of physical injury  Outcome: Progressing     Problem: Skin/Tissue Integrity  Goal: Absence of new skin breakdown  Description: 1. Monitor for areas of redness and/or skin breakdown  2. Assess vascular access sites hourly  3. Every 4-6 hours minimum:  Change oxygen saturation probe site  4. Every 4-6 hours:  If on nasal continuous positive airway pressure, respiratory therapy assess nares and determine need for appliance change or resting period.   Outcome: Progressing     Problem: Chronic Conditions and Co-morbidities  Goal: Patient's chronic conditions and co-morbidity symptoms are monitored and maintained or improved  Outcome: Progressing     Problem: Respiratory - Adult  Goal: Achieves optimal ventilation and oxygenation  Outcome: Progressing     Problem: Cardiovascular - Adult  Goal: Maintains optimal cardiac output and hemodynamic stability  Outcome: Progressing  Goal: Absence of cardiac dysrhythmias or at baseline  Outcome: Progressing

## 2022-12-31 NOTE — CONSULTS
[facility] Cardiology Initial Cardiac Evaluation      Date of  Admission: 12/30/2022  3:23 PM     Primary Care Physician: Demetrius Rosen MD  Primary Cardiologist: Dr Aldo Scanlon  Referring Physician: Dr Yordan Wang Physician: Dr Anderson Dear    CC/Reason for evaluation: results of TTE and CHF    HPI:  Jose Ash is a 78 y.o. male with prior history of severe left ventricular dysfunction/severely dilated LV; also possible severe aortic regurgitation but eccentric jet [4/6/22; mild RV dysfn; RVSP 41mmHg]. CARY with similar findings and moderate to severe eccentric aortic regurgitation from 5/5/2022; some suggestion of possible noncompaction. Coronary angiogram from 5/5/2022 with distal RCA around 90%; other anatomy with mild nonobstructive disease, ILD and CKD stage 3 who presented to [facility] ED on 12/30 at the direction of his PCP for abnormal lab valves. Patient reported worsening shortness of breath and swelling. In ED, he was found to have an acute kidney injury, volume overload, and elevation of transaminases. He was admitted to the ICU by medicine team for further evaluation and management. Patient was placed on dopamine gtt and bumex gtt. ECHO was performed. Cardiology consulted for results of TTE and CHF. She was admitted in 11/2022 by Dr. Chrissy Richey for right hand weakness and symptoms imrpoved. MRI negative at that time. No indication for anticoagulation at that time. Past Medical History:   Diagnosis Date    Aortic regurgitation     mod-severe eccentric-  ECHO 5/5/22; per MD note 8/8/22 not controlled    Bronchiectasis ([address]     Cardiomyopathy ([address]     CHF (congestive heart failure) ([address]     ED (erectile dysfunction)     Edema     H/O coronary angiogram 05/05/2022    cath report: mild LM disease, severe MV, severe LV dysfunction, increased LVEDP.  MD note 8/8/22: Emilia Owenss RCA ~90%, other anatomy with mild non-obstructive disease\"    History of transesophageal echocardiography (CARY) 2022    EF 15-20%, left ventrical severely dilated, AV mod-severe eccentric regurgitation, MVR mild to moderate, RVSP 41 mmHg    Hyperlipidemia     Hypertension     medication    ILD (interstitial lung disease) ([address]     Ofev    Murmur     cardiac note 22: No definite PND/ orthopnea. Improved sx since initial eval on 22, down 15-20 pounds, states improved ROOT, persistent lower ext edema, states can walk over a flight of stairs. No chest discomfort. Denies dizziness.     Osteoarthritis of knees, bilateral     TIA (transient ischemic attack) 2022    Vitamin D deficiency       Past Surgical History:   Procedure Laterality Date    COLONOSCOPY  2019    Dr. Sharyle Primrose, due for repeat in 5 years    HERNIA REPAIR Left 2022    INCARCERATED LEFT INGUINAL HERNIA REPAIR W/ MESH performed by Melodie Fleming MD at [facility] MAIN OR    ORTHOPEDIC SURGERY Left     left wrist, cyst       Allergies   Allergen Reactions    Penicillin G Shortness Of Breath      Social History     Socioeconomic History    Marital status: Legally      Spouse name: Not on file    Number of children: Not on file    Years of education: Not on file    Highest education level: Not on file   Occupational History    Occupation:      Employer: [employer]   Tobacco Use    Smoking status: Former     Packs/day: 1.00     Years: 20.00     Pack years: 20.00     Types: Cigarettes     Quit date: 2014     Years since quittin.6    Smokeless tobacco: Never   Vaping Use    Vaping Use: Never used   Substance and Sexual Activity    Alcohol use: Yes     Comment: rare    Drug use: No    Sexual activity: Not Currently   Other Topics Concern    Not on file   Social History Narrative    Not on file     Social Determinants of Health     Financial Resource Strain: Not on file   Food Insecurity: Not on file   Transportation Needs: Not on file   Physical Activity: Not on file   Stress: Not on file   Social Connections: Not on file   Intimate Partner Violence: Not on file   Housing Stability: Not on file     Social History       Tobacco History       Smoking Status  Former Quit Date  5/22/2014 Smoking Frequency  1 pack/day for 20.00 years (20.00 pk-yrs) Smoking Tobacco Type  Cigarettes quit in 5/22/2014      Smokeless Tobacco Use  Never              Alcohol History       Alcohol Use Status  Yes Comment  rare              Drug Use       Drug Use Status  No              Sexual Activity       Sexually Active  Not Currently                    Family History   Problem Relation Age of Onset    Breast Cancer Sister     Heart Disease Father     Coronary Art Dis Neg Hx         Current Facility-Administered Medications   Medication Dose Route Frequency    nystatin (MYCOSTATIN) powder   Topical BID    Medela Tender Care Lanolin cream   Topical PRN    aspirin EC tablet 81 mg  81 mg Oral Daily    atorvastatin (LIPITOR) tablet 80 mg  80 mg Oral Nightly    metoprolol succinate (TOPROL XL) extended release tablet 25 mg  25 mg Oral Daily    spironolactone (ALDACTONE) tablet 25 mg  25 mg Oral Daily    sodium chloride flush 0.9 % injection 5-40 mL  5-40 mL IntraVENous 2 times per day    sodium chloride flush 0.9 % injection 5-40 mL  5-40 mL IntraVENous PRN    0.9 % sodium chloride infusion   IntraVENous PRN    ondansetron (ZOFRAN-ODT) disintegrating tablet 4 mg  4 mg Oral Q8H PRN    Or    ondansetron (ZOFRAN) injection 4 mg  4 mg IntraVENous Q6H PRN    polyethylene glycol (GLYCOLAX) packet 17 g  17 g Oral Daily PRN    acetaminophen (TYLENOL) tablet 650 mg  650 mg Oral Q6H PRN    Or    acetaminophen (TYLENOL) suppository 650 mg  650 mg Rectal Q6H PRN    albumin human 25 % IV solution 25 g  25 g IntraVENous Q6H    bumetanide (BUMEX) 12.5 mg in 50 mL infusion  0.2 mg/hr IntraVENous Continuous    potassium chloride (KLOR-CON M) extended release tablet 40 mEq  40 mEq Oral PRN    Or    potassium bicarb-citric acid (EFFER-K) effervescent tablet 40 mEq  40 mEq Oral PRN    Or    potassium chloride 10 mEq/100 mL IVPB (Peripheral Line)  10 mEq IntraVENous PRN    magnesium sulfate 2000 mg in 50 mL IVPB premix  2,000 mg IntraVENous PRN    sodium phosphate 10 mmol in sodium chloride 0.9 % 250 mL IVPB  10 mmol IntraVENous PRN    Or    sodium phosphate 15 mmol in sodium chloride 0.9 % 250 mL IVPB  15 mmol IntraVENous PRN    Or    sodium phosphate 20 mmol in sodium chloride 0.9 % 500 mL IVPB  20 mmol IntraVENous PRN    heparin (porcine) injection 5,000 Units  5,000 Units SubCUTAneous 3 times per day    DOPamine (INTROPIN) 800 mg in dextrose 5 % 250 mL infusion  1-20 mcg/kg/min IntraVENous Continuous       Review of Symptoms:  Review of Systems   Constitutional: Negative. HENT: Negative. Eyes: Negative. Cardiovascular:  Positive for leg swelling and orthopnea. Respiratory:  Positive for shortness of breath. Endocrine: Negative. Hematologic/Lymphatic: Negative. Skin: Negative. Musculoskeletal: Negative. Gastrointestinal: Negative. Genitourinary: Negative. Neurological: Negative. Psychiatric/Behavioral: Negative. Subjective:     Physical Exam:    Vitals:    12/31/22 0717 12/31/22 0742 12/31/22 1119 12/31/22 1500   BP: 97/78  83/68 93/69   Pulse: 84 85 77 81   Resp: 18 15 21 21   Temp: 97.5 °F (36.4 °C)  97.7 °F (36.5 °C) 97.5 °F (36.4 °C)   TempSrc: Oral  Oral Oral   SpO2: 94%  98% 97%   Weight:       Height:           Physical Exam  Vitals and nursing note reviewed. Constitutional:       Appearance: Normal appearance. He is obese. HENT:      Head: Normocephalic and atraumatic. Nose: Nose normal.      Mouth/Throat:      Mouth: Mucous membranes are dry. Pharynx: Oropharynx is clear. Eyes:      Extraocular Movements: Extraocular movements intact. Conjunctiva/sclera: Conjunctivae normal.   Cardiovascular:      Rate and Rhythm: Normal rate and regular rhythm. Pulses: Normal pulses.       Heart sounds: Normal heart sounds. No murmur heard. No gallop. Pulmonary:      Breath sounds: Normal breath sounds. Abdominal:      General: Abdomen is flat. Bowel sounds are normal.      Palpations: Abdomen is soft. Musculoskeletal:      Cervical back: Normal range of motion and neck supple. Right lower leg: Edema present. Left lower leg: Edema present. Skin:     General: Skin is warm and dry. Capillary Refill: Capillary refill takes less than 2 seconds. Neurological:      General: No focal deficit present. Mental Status: He is alert. Psychiatric:         Mood and Affect: Mood normal.     Cardiographics    Telemetry: normal sinus rhythm  ECG: normal EKG, normal sinus rhythm, unchanged from previous tracings, normal sinus rhythm, 1st degree AV block, LAFB, LAE, NSST changes. Echocardiogram: 12/30/22    TRANSTHORACIC ECHOCARDIOGRAM (TTE) LIMITED (CONTRAST/BUBBLE/3D PRN) 12/31/2022  1:14 PM (Final)    Interpretation Summary    Left Ventricle: Severely reduced left ventricular systolic function. EF by 2D Simpsons Biplane is 21%. Left ventricle is severely dilated. Normal wall thickness. Severe global hypokinesis present. Right Ventricle: Right ventricle is moderately dilated. Severely reduced systolic function. Aortic Valve: Valve structure is normal. Moderately calcified cusp. Moderate to severe regurgitation. Mild stenosis of the aortic valve. AV mean gradient is 11 mmHg. AV peak gradient is 17 mmHg. Mitral Valve: Mild to moderate regurgitation. Tricuspid Valve: Moderate regurgitation. Left Atrium: Left atrium is moderately dilated. Right Atrium: Right atrium is moderately dilated. Technical qualifiers: Color flow Doppler was performed and pulse wave and/or continuous wave Doppler was performed. Contrast used: Definity.     Signed by: Sharda Xie MD on 12/31/2022  1:14 PM      Labs:     Recent Labs     12/31/22  0420 12/30/22  1418 12/29/22  1032   WBC 6.6 6.6 6.5   HGB 14.5 16.5 16.6   HCT 43.5 50.4* 50.6*   MCV 96.7 99.2 99.2   PLT 93* 102* 95*     Lab Results   Component Value Date    WBC 6.6 12/31/2022    HGB 14.5 12/31/2022    HCT 43.5 12/31/2022    PLT 93 (L) 12/31/2022    CHOL 66 12/31/2022    TRIG 78 12/31/2022    HDL 19 (L) 12/31/2022    ALT 65 12/31/2022    AST 67 (H) 12/31/2022     12/31/2022    K 4.0 12/31/2022     12/31/2022    CREATININE 2.14 (H) 12/31/2022    BUN 36 (H) 12/31/2022    CO2 23 12/31/2022    TSH 1.870 04/04/2022    PSA 0.6 11/02/2022    INR 1.4 11/19/2022    LABA1C 6.3 (H) 12/31/2022    LABMICR Comment 04/21/2021       Assessment/Plan:       Principal Problem:    Stage 2 acute kidney injury ([facility]  - Acute on chronic CKD, improving gradually. - Avoid nephrotoxic medicines. - On bumex gtt, would continue for now kalyan with improvement of renal function.   - BRYON likely related to renovascular congestion from poor forward flow/cardiorenal syndrome.   - Aided by use of dopamine at the moment. Active Problems:    Demand ischemia of myocardium (HCC)  - Likely from acute decompensated HF. No role for urgent LHC/invasive assessment at this time especially with improvement of symptoms. Chronic combined systolic and diastolic congestive heart failure (HCC)  - NYHA class IIIB  - Severely decompensated with 20-30 lb weight gain. - Agree with IV bumex with goal net negative UOP of 1-2 L daily. - Careful assessment of renal function, has improved with therapies. - Carefully add back GDMT as able. If unable to tolerate ACEi/ARB/ARNI, would favor use of hydralazine and possibly imdur.   - If on dopamine infusion, hold BB. Would wean off this ASAP if BP stable. Pt needs aggressive diuresis. - Strict I/Os, ensure K/Mg repleted as needed, avoid dietary indiscretions.    - Consider outpt advanced heart failure clinic assessment and ensure follow up with Dr. Elma Yuan.   - If patient improves and can maintain stability as outpt, EP referral should be performed for consideration of ICD. He also would be a reasonable candidate for CCM therapy if/when available in our health system. Aortic Regurgitation  - Moderate to severe by echo. - Complicates care especially with severely reduced EF.   - Continue close outpt follow with Dr. Jose De Jesus Rosa.   - Pt would be high risk for any CT surgery with his history. Mild Transaminitis   -Likely from hepatic congestion as a consequence of poor forward flow/HF. -Labs have demonstrated improvement with diuresis. Chronic kidney disease, stage 3a ([address]  - Continue current therapies. - Avoid ACE/ARB/ARNI for now given BRYON      ILD (interstitial lung disease) ([address]  - Per medicine. Hyperlipidemia  - Continue statin      Class 1 obesity due to excess calories with serious comorbidity and body mass index (BMI) of 34.0 to 34.9 in adult  - complicated medical care. Weight loss is recommended as outpt strategy. Thank you for requesting cardiac evaluation and allowing us to participate in the care of this patient. We will continue to follow along with you. Delmis Sin.  Robert Hoffman MD, [address] [facility] Cardiac Electrophysiology  [facility] Cardiology

## 2022-12-31 NOTE — PROGRESS NOTES
Nutrition Note:   Acknowledge Consult for Education: CHF diet education    Initial education to be completed by CHF Nurse Navigator. Will defer RD intervention at this time. Navigator will reconsult RD if additional diet education intervention needed.     Morena Swanson RD

## 2022-12-31 NOTE — PROGRESS NOTES
Skin assessment done with Conrado Grit. Pt with no skin breakdown. Does have some moisture in folds and some possible yeast. Order received for Nystatin powder. Pt bathed and adjusted in bed for comfort.

## 2022-12-31 NOTE — ED NOTES
TRANSFER - OUT REPORT:    Verbal report given to Aga PÉREZ on Jose Ash  being transferred to [facility] for routine progression of patient care       Report consisted of patient's Situation, Background, Assessment and   Recommendations(SBAR). Information from the following report(s) ED SBAR was reviewed with the receiving nurse. Lines:   Peripheral IV 12/30/22 Left Antecubital (Active)   Site Assessment Clean, dry & intact 12/30/22 1416   Line Status Normal saline locked;Specimen collected; Flushed;Capped;Brisk blood return 12/30/22 1416   Phlebitis Assessment No symptoms 12/30/22 1416   Infiltration Assessment 0 12/30/22 1416   Dressing Status New dressing applied 12/30/22 1416   Dressing Type Transparent 12/30/22 1416        Opportunity for questions and clarification was provided.       Patient transported with:  Monitor and Registered Nurse     Reinaldo Cockayne, RN  12/30/22 [id number]

## 2022-12-31 NOTE — PROGRESS NOTES
TRANSFER - IN REPORT:    Verbal report received from ELISA Gonzalez on Guzman Tilley  being received from ED for routine progression of patient care      Report consisted of patient's Situation, Background, Assessment and   Recommendations(SBAR). Information from the following report(s) Nurse Handoff Report, ED Encounter Summary, Intake/Output, MAR, and Recent Results was reviewed with the receiving nurse. Opportunity for questions and clarification was provided. Assessment completed upon patient's arrival to unit and care assumed.

## 2023-01-01 ENCOUNTER — HOME CARE VISIT (OUTPATIENT)
Dept: HOSPICE | Facility: HOSPICE | Age: 68
End: 2023-01-01
Payer: COMMERCIAL

## 2023-01-01 ENCOUNTER — APPOINTMENT (OUTPATIENT)
Dept: GENERAL RADIOLOGY | Age: 68
DRG: 682 | End: 2023-01-01
Payer: MEDICARE

## 2023-01-01 PROBLEM — R79.89 ELEVATED TROPONIN: Status: ACTIVE | Noted: 2023-01-01

## 2023-01-01 PROBLEM — I50.9 CONGESTIVE HEART FAILURE (HCC): Status: ACTIVE | Noted: 2023-01-01

## 2023-01-01 PROBLEM — R77.8 ELEVATED TROPONIN: Status: ACTIVE | Noted: 2023-01-01

## 2023-01-01 LAB
ALBUMIN SERPL-MCNC: 3.2 G/DL (ref 3.2–4.6)
ALBUMIN/GLOB SERPL: 1.1 {RATIO} (ref 0.4–1.6)
ALP SERPL-CCNC: 166 U/L (ref 50–136)
ALT SERPL-CCNC: 62 U/L (ref 12–65)
ANION GAP SERPL CALC-SCNC: 8 MMOL/L (ref 2–11)
APTT PPP: 37.7 SEC (ref 24.5–34.2)
ARTERIAL PATENCY WRIST A: POSITIVE
ARTERIAL PATENCY WRIST A: POSITIVE
AST SERPL-CCNC: 56 U/L (ref 15–37)
BASE DEFICIT BLD-SCNC: 2.6 MMOL/L
BASE DEFICIT BLD-SCNC: 3.7 MMOL/L
BDY SITE: ABNORMAL
BDY SITE: ABNORMAL
BILIRUB DIRECT SERPL-MCNC: 3 MG/DL
BILIRUB SERPL-MCNC: 4.9 MG/DL (ref 0.2–1.1)
BUN SERPL-MCNC: 38 MG/DL (ref 8–23)
CALCIUM SERPL-MCNC: 9.2 MG/DL (ref 8.3–10.4)
CHLORIDE SERPL-SCNC: 101 MMOL/L (ref 101–110)
CO2 SERPL-SCNC: 25 MMOL/L (ref 21–32)
CREAT SERPL-MCNC: 2.27 MG/DL (ref 0.8–1.5)
D DIMER PPP FEU-MCNC: 2.7 UG/ML(FEU)
ERYTHROCYTE [DISTWIDTH] IN BLOOD BY AUTOMATED COUNT: 15.3 % (ref 11.9–14.6)
GAS FLOW.O2 O2 DELIVERY SYS: ABNORMAL L/MIN
GAS FLOW.O2 O2 DELIVERY SYS: ABNORMAL L/MIN
GLOBULIN SER CALC-MCNC: 2.9 G/DL (ref 2.8–4.5)
GLUCOSE SERPL-MCNC: 113 MG/DL (ref 65–100)
HCO3 BLD-SCNC: 21.3 MMOL/L (ref 22–26)
HCO3 BLD-SCNC: 21.4 MMOL/L (ref 22–26)
HCT VFR BLD AUTO: 43.1 % (ref 41.1–50.3)
HGB BLD-MCNC: 14.3 G/DL (ref 13.6–17.2)
INR PPP: 1.5
MAGNESIUM SERPL-MCNC: 2 MG/DL (ref 1.8–2.4)
MCH RBC QN AUTO: 32.2 PG (ref 26.1–32.9)
MCHC RBC AUTO-ENTMCNC: 33.2 G/DL (ref 31.4–35)
MCV RBC AUTO: 97.1 FL (ref 82–102)
NRBC # BLD: 0 K/UL (ref 0–0.2)
O2/TOTAL GAS SETTING VFR VENT: 100 %
O2/TOTAL GAS SETTING VFR VENT: 100 %
PCO2 BLD: 34.4 MMHG (ref 35–45)
PCO2 BLD: 38 MMHG (ref 35–45)
PH BLD: 7.36 [PH] (ref 7.35–7.45)
PH BLD: 7.4 [PH] (ref 7.35–7.45)
PHOSPHATE SERPL-MCNC: 2.9 MG/DL (ref 2.3–3.7)
PLATELET # BLD AUTO: 81 K/UL (ref 150–450)
PMV BLD AUTO: 10.9 FL (ref 9.4–12.3)
PO2 BLD: 141 MMHG (ref 75–100)
PO2 BLD: 261 MMHG (ref 75–100)
POTASSIUM SERPL-SCNC: 4 MMOL/L (ref 3.5–5.1)
PROCALCITONIN SERPL-MCNC: 0.48 NG/ML (ref 0–0.49)
PROT SERPL-MCNC: 6.1 G/DL (ref 6.3–8.2)
PROTHROMBIN TIME: 18 SEC (ref 12.6–14.3)
RBC # BLD AUTO: 4.44 M/UL (ref 4.23–5.6)
SAO2 % BLD: 99.1 % (ref 95–98)
SAO2 % BLD: 99.9 % (ref 95–98)
SERVICE CMNT-IMP: ABNORMAL
SERVICE CMNT-IMP: ABNORMAL
SODIUM SERPL-SCNC: 134 MMOL/L (ref 133–143)
SPECIMEN TYPE: ABNORMAL
SPECIMEN TYPE: ABNORMAL
UFH PPP CHRO-ACNC: 0.76 IU/ML (ref 0.3–0.7)
WBC # BLD AUTO: 6.4 K/UL (ref 4.3–11.1)

## 2023-01-01 PROCEDURE — 85610 PROTHROMBIN TIME: CPT

## 2023-01-01 PROCEDURE — 36600 WITHDRAWAL OF ARTERIAL BLOOD: CPT

## 2023-01-01 PROCEDURE — 80048 BASIC METABOLIC PNL TOTAL CA: CPT

## 2023-01-01 PROCEDURE — 97530 THERAPEUTIC ACTIVITIES: CPT

## 2023-01-01 PROCEDURE — 6360000002 HC RX W HCPCS: Performed by: FAMILY MEDICINE

## 2023-01-01 PROCEDURE — 97162 PT EVAL MOD COMPLEX 30 MIN: CPT

## 2023-01-01 PROCEDURE — 85027 COMPLETE CBC AUTOMATED: CPT

## 2023-01-01 PROCEDURE — 71045 X-RAY EXAM CHEST 1 VIEW: CPT

## 2023-01-01 PROCEDURE — 84145 PROCALCITONIN (PCT): CPT

## 2023-01-01 PROCEDURE — 85379 FIBRIN DEGRADATION QUANT: CPT

## 2023-01-01 PROCEDURE — 83735 ASSAY OF MAGNESIUM: CPT

## 2023-01-01 PROCEDURE — 85730 THROMBOPLASTIN TIME PARTIAL: CPT

## 2023-01-01 PROCEDURE — 2580000003 HC RX 258: Performed by: FAMILY MEDICINE

## 2023-01-01 PROCEDURE — 2500000003 HC RX 250 WO HCPCS: Performed by: FAMILY MEDICINE

## 2023-01-01 PROCEDURE — 80076 HEPATIC FUNCTION PANEL: CPT

## 2023-01-01 PROCEDURE — 84100 ASSAY OF PHOSPHORUS: CPT

## 2023-01-01 PROCEDURE — 2000000000 HC ICU R&B

## 2023-01-01 PROCEDURE — 6370000000 HC RX 637 (ALT 250 FOR IP): Performed by: FAMILY MEDICINE

## 2023-01-01 PROCEDURE — 36415 COLL VENOUS BLD VENIPUNCTURE: CPT

## 2023-01-01 PROCEDURE — 82803 BLOOD GASES ANY COMBINATION: CPT

## 2023-01-01 PROCEDURE — P9047 ALBUMIN (HUMAN), 25%, 50ML: HCPCS | Performed by: FAMILY MEDICINE

## 2023-01-01 PROCEDURE — 2700000000 HC OXYGEN THERAPY PER DAY

## 2023-01-01 PROCEDURE — 85520 HEPARIN ASSAY: CPT

## 2023-01-01 RX ORDER — ALBUMIN (HUMAN) 12.5 G/50ML
12.5 SOLUTION INTRAVENOUS EVERY 6 HOURS
Status: DISCONTINUED | OUTPATIENT
Start: 2023-01-01 | End: 2023-01-01

## 2023-01-01 RX ORDER — HEPARIN SODIUM 1000 [USP'U]/ML
80 INJECTION, SOLUTION INTRAVENOUS; SUBCUTANEOUS PRN
Status: DISCONTINUED | OUTPATIENT
Start: 2023-01-01 | End: 2023-01-02

## 2023-01-01 RX ORDER — DIMETHICONE, CAMPHOR (SYNTHETIC), MENTHOL, AND PHENOL 1.1; .5; .625; .5 G/100G; G/100G; G/100G; G/100G
OINTMENT TOPICAL PRN
Status: DISCONTINUED | OUTPATIENT
Start: 2023-01-01 | End: 2023-01-03 | Stop reason: HOSPADM

## 2023-01-01 RX ORDER — ALBUMIN (HUMAN) 12.5 G/50ML
12.5 SOLUTION INTRAVENOUS EVERY 6 HOURS
Status: COMPLETED | OUTPATIENT
Start: 2023-01-02 | End: 2023-01-02

## 2023-01-01 RX ORDER — HEPARIN SODIUM 1000 [USP'U]/ML
40 INJECTION, SOLUTION INTRAVENOUS; SUBCUTANEOUS PRN
Status: DISCONTINUED | OUTPATIENT
Start: 2023-01-01 | End: 2023-01-02

## 2023-01-01 RX ORDER — HEPARIN SODIUM 1000 [USP'U]/ML
80 INJECTION, SOLUTION INTRAVENOUS; SUBCUTANEOUS ONCE
Status: COMPLETED | OUTPATIENT
Start: 2023-01-01 | End: 2023-01-01

## 2023-01-01 RX ORDER — HEPARIN SODIUM 10000 [USP'U]/100ML
5-30 INJECTION, SOLUTION INTRAVENOUS CONTINUOUS
Status: DISCONTINUED | OUTPATIENT
Start: 2023-01-01 | End: 2023-01-02

## 2023-01-01 RX ADMIN — HEPARIN SODIUM 5000 UNITS: 5000 INJECTION INTRAVENOUS; SUBCUTANEOUS at 06:30

## 2023-01-01 RX ADMIN — SPIRONOLACTONE 25 MG: 25 TABLET ORAL at 08:22

## 2023-01-01 RX ADMIN — NYSTATIN: 100000 POWDER TOPICAL at 22:39

## 2023-01-01 RX ADMIN — HEPARIN SODIUM 9340 UNITS: 1000 INJECTION INTRAVENOUS; SUBCUTANEOUS at 11:35

## 2023-01-01 RX ADMIN — ASPIRIN 81 MG: 81 TABLET, COATED ORAL at 08:22

## 2023-01-01 RX ADMIN — NYSTATIN: 100000 POWDER TOPICAL at 11:59

## 2023-01-01 RX ADMIN — HEPARIN SODIUM 17 UNITS/KG/HR: 10000 INJECTION, SOLUTION INTRAVENOUS at 11:46

## 2023-01-01 RX ADMIN — ALBUMIN (HUMAN) 12.5 G: 0.25 INJECTION, SOLUTION INTRAVENOUS at 09:26

## 2023-01-01 RX ADMIN — SODIUM CHLORIDE, PRESERVATIVE FREE 5 ML: 5 INJECTION INTRAVENOUS at 11:58

## 2023-01-01 RX ADMIN — ONDANSETRON 4 MG: 2 INJECTION INTRAMUSCULAR; INTRAVENOUS at 09:32

## 2023-01-01 RX ADMIN — ATORVASTATIN CALCIUM 80 MG: 40 TABLET, FILM COATED ORAL at 22:35

## 2023-01-01 RX ADMIN — SODIUM CHLORIDE, PRESERVATIVE FREE 5 ML: 5 INJECTION INTRAVENOUS at 22:36

## 2023-01-01 RX ADMIN — ALBUMIN (HUMAN) 12.5 G: 0.25 INJECTION, SOLUTION INTRAVENOUS at 18:18

## 2023-01-01 RX ADMIN — DOPAMINE HYDROCHLORIDE 5 MCG/KG/MIN: 320 INJECTION, SOLUTION INTRAVENOUS at 08:16

## 2023-01-01 RX ADMIN — Medication: at 22:35

## 2023-01-01 RX ADMIN — BUMETANIDE 0.2 MG/HR: 0.25 INJECTION INTRAMUSCULAR; INTRAVENOUS at 09:06

## 2023-01-01 ASSESSMENT — PAIN SCALES - GENERAL
PAINLEVEL_OUTOF10: 0

## 2023-01-01 NOTE — CARE COORDINATION
Consult to JUVENAL for discharge planning. SW familiar with patient from previous admission where he lived alone, was discharged from therapy, and advised that he had plans to move in with his sister for additional support. PT evaluation currently ordered. Will follow for recommendations and then discuss with patient. Update: Recommendation for STR. Asked RN for PPD. Advised patient of recommendations and provided him with a choice list which he will review with his sister. Patient is not medically stable for transfer.     ASSESSMENT NOTE    Attending Physician: Sheba Lu MD  Admit Problem: Elevated troponin [R77.8]  Acute on chronic systolic congestive heart failure (HCC) [I50.23]  Acute renal failure, unspecified acute renal failure type ([address] [N17.9]  Stage 2 acute kidney injury ([address] [N17.9]  Congestive heart failure, unspecified HF chronicity, unspecified heart failure type ([address] [I50.9]  Date/Time of Admission: 12/30/2022  3:23 PM  Problem List:  Patient Active Problem List   Diagnosis    Dyspnea    Primary hypertension    Diverticulosis of intestine    History of smoking 25-50 pack years    Male erectile disorder    ILD (interstitial lung disease) ([address]    Vasculogenic erectile dysfunction    Bronchiectasis without complication ([address]    Hyperlipidemia    Early syphilis, syphilitic uveitis    Class 1 obesity due to excess calories with serious comorbidity and body mass index (BMI) of 34.0 to 34.9 in adult    Clubbing of fingers    Primary osteoarthritis of both knees    Pulmonary infiltrates    Benign neoplasm of colon    Vitamin D deficiency    Dilated cardiomyopathy (HCC)    Chronic combined systolic and diastolic congestive heart failure (HCC)    Incarcerated left inguinal hernia    Aortic regurgitation    CAD (coronary artery disease)    Stage 2 acute kidney injury ([address]    Demand ischemia of myocardium ([address]    Chronic kidney disease, stage 3a [facility])       Service Assessment  Patient Orientation Situation, Self, Place, Person, Alert and Oriented   Cognition Alert   History Provided By Patient   Primary Caregiver Family   Accompanied By/Relationship     Support Systems Family Members (Sister)   Patient's Healthcare Decision Maker is: Legal Next of Gayathri Baptiste   PCP Verified by CM Yes   Last Visit to PCP     Prior Functional Level Independent in ADLs/IADLs   Current Functional Level Other (see comment) (Unable to assess, patient on NRB and unable to communicate)   Can patient return to prior living arrangement No   Ability to make needs known: Good   Family able to assist with home care needs: Yes   Would you like for me to discuss the discharge plan with any other family members/significant others, and if so, who?  Yes (Sister)   Financial Resources Medicare   Community Resources None   CM/SW Referral       Social/Functional History  Lives With Family   Type of [address] [facility] [address] One level   Home Access Level entry   [address] - Number of Steps     Entrance Stairs - Rails     Bathroom Shower/Tub     [facility] [address]     [address] Work     Driving     Shopping          Other (Comment)     [address] [facility] Paying/Finance [id number] [facility] Management     Other (Comment)     Ambulation Assistance     Transfer Assistance     Active      Patient's  Info     Mode of Transportation     Education     Occupation     Type of Occupation       Discharge Planning   Type of Residence Apartment   Living Arrangements Family Members (Lives with sister) Support Systems Family Members (Sister)   Current Services Prior To Admission None   Potential Assistance Needed N/A   DME     DME     DME Ordered? Willy Whitfield   Potential Assistance Purchasing Medications No   Meds-to-Beds: Does the patient want to have any new prescriptions delivered to bedside prior to discharge? Type of Home Care Services None   Patient expects to be discharged to: Skilled nursing facility   Follow Up Appointment: Best Day/Time Friday AM   One/Two Story Residence: One story   # of Interior Steps     Height of Each Step (in)     [employer] Available     History of Falls? Yes     Services At/After Discharge  Transition of Care Consult (CM Consult): Internal Home Health     Internal Hospice     Reason Outside Agency [address]     Partner SNF     Reason Why Partner SNF Not Chosen     Internal Comfort Care     Reason Outside [address] Discharge     [address] Provided? Mode of Transport at [facility] Time of Discharge     Confirm Follow Up Transport       Condition of Participation: Discharge Planning  The plan for Transition of Care is related to the following treatment goals: STR   The Patient and/or Patient Representative was provided with a Choice of Provider? Patient   Name of the Patient Representative who was provided with the Choice of Provider and agrees with the Discharge Plan? The Patient and/or Patient Representative Agree with the Discharge Plan? Yes   Freedom of Choice list was provided with basic dialogue that supports the individualized plan of care/goals, treatment preferences, and shares the quality data associated with the providers?  Yes     Documentation for Discharge Appeal  Discharge Appealed by     Date notified by QIO of appeal request:     Time notified by QIO of appeal request:     Detailed Notice of Discharge given to:     Date Notice of Discharge given:     Time Notice of Discharge given:     Date records sent to O     Time records sent to Mary Ellen Leal     Date Notified of Outcome     Time Notified of Outcome     Outcome of appeal           Barbi Dear, SAMANTHA 01/01/23 11:14 AM      Earnest Watts LMSW    [address] [place] [address]

## 2023-01-01 NOTE — PROGRESS NOTES
ACUTE PHYSICAL THERAPY GOALS:   (Developed with and agreed upon by patient and/or caregiver.)  STG:  (1.)Mr. Barlow will move from supine to sit and sit to supine  with STAND BY ASSIST within 5 treatment day(s). (2.)Mr. Barlow will transfer from bed to chair and chair to bed with STAND BY ASSIST using the least restrictive device within 5 treatment day(s). (3.)Mr. Barlow will ambulate with STAND BY ASSIST for 50 feet with the least restrictive device within 5 treatment day(s). LTG:  (1.)Mr. Barlow will move from supine to sit and sit to supine  in bed with INDEPENDENT within 10 treatment day(s). (2.)Mr. Barlow will transfer from bed to chair and chair to bed with INDEPENDENT using the least restrictive device within 10 treatment day(s). (3.)Mr. Barlow will ambulate with SUPERVISION for 100 feet with the least restrictive device within 10 treatment day(s).   ________________________________________________________________________________________________      PHYSICAL THERAPY Initial Assessment and AM  (Link to Caseload Tracking: PT Visit Days : 1  Acknowledge Orders  Time In/Out  PT Charge Capture  Rehab Caseload Tracker    Rossy Wilkes is a 78 y.o. male   PRIMARY DIAGNOSIS: Stage 2 acute kidney injury ([facility] [address]  Elevated troponin [R77.8]  Acute on chronic systolic congestive heart failure (HCC) [I50.23]  Acute renal failure, unspecified acute renal failure type (HCC) [N17.9]  Stage 2 acute kidney injury ([facility] [address] [N17.9]  Congestive heart failure, unspecified HF chronicity, unspecified heart failure type ([address] [I50.9]       Reason for Referral: Generalized Muscle Weakness (M62.81)  Difficulty in walking, Not elsewhere classified (R26.2)  History of falling (Z91.81)  Inpatient: Payor: Tarry Flood / Plan: MEDICARE PART A / Product Type: *No Product type* /     ASSESSMENT:     REHAB RECOMMENDATIONS:   Recommendation to date pending progress:  Setting:  HH vs STR-will have better idea after 1-2 more sessions. Equipment:    To Be Determined     ASSESSMENT:  Mr. Yani Isaacs admitted with above diagnoses. Patient recently admitted in November. At that time, patient was living alone, independent with mobility, driving and working. Patient was evaluated by therapy during that admission and did not have any acute or d/c therapy needs. Patient is now living with his sister, using a walker for mobility and reports a fall this week. Patient is currently demonstrating a decline in his level of function with decreased mobility, balance, activity tolerance and overall independence. Patient would benefit from therapy to address these deficits. Unsure of d/c needs at this time. Sister reports they had been speaking with PCP about [facility] services. Explained we could definitely arrange that but spoke with her and her daughter about possibility of STR if patient doesn't progress as expected-patient not able to participate in conversation as he was being attended to by RN and RT. Explained benefit of having a back-up plan for STR but assured them that therapy would continue to work with his while he was admitted. Sister was in agreement. Patient pleasant and cooperative today. Seen with RN as she wanted to wrap his proximal Les due to swelling. Patient stood multiple times at walker for wrapping but fatigued easily. He was medically not stable to place in a chair as his sats were dropping and not responding to increased O2. He was placed back in the bed with RN and RT at bedside.        [address] [facility] [address] [address] AM-PAC 6 Clicks Basic Mobility Inpatient Short Form  AM-PAC Mobility Inpatient   How much difficulty turning over in bed?: A Little  How much difficulty sitting down on / standing up from a chair with arms?: A Little  How much difficulty moving from lying on back to sitting on side of bed?: A Little  How much help from another person moving to and from a bed to a chair?: A Little  How much help from another person needed to walk in hospital room?: A Little  How much help from another person for climbing 3-5 steps with a railing?: A Lot  AM-PAC Inpatient Mobility Raw Score : 17  AM-PAC Inpatient T-Scale Score : 42.13  Mobility Inpatient CMS 0-100% Score: 50.57  Mobility Inpatient CMS G-Code Modifier : CK    SUBJECTIVE:   Mr. Yani Isaacs agreeable.     Social/Functional Lives With: Family  Type of Home: House  Home Layout: One level  Home Access: Level entry  Home Equipment: gin Smith    OBJECTIVE:     PAIN: Lupie Sober / O2: PRECAUTION / Zack Money / DRAINS:   Pre Treatment:   Pain Assessment: 0-10  Pain Level: 0      Post Treatment: 0 Vitals        Oxygen-initially on NC at 4L but left with RT present due to decreased sats at 10L      Continuous Pulse Oximetry, [place] Catheter, IV, and Telemetry     RESTRICTIONS/PRECAUTIONS:                    GROSS EVALUATION: Intact Impaired (Comments):   AROM []  Decreased, functional   PROM []    Strength []  Decreased, functional   Balance [] Sitting - Static: Good  Sitting - Dynamic: Fair, +  Standing - Static: Fair  Standing - Dynamic: Fair   Posture [] Forward Head  Rounded Shoulders   Sensation []     Coordination []   Decreased, functional   Tone [x]     Edema [] increased   Activity Tolerance [] Treatment limited secondary to medical complications (free text)-shortness of breath and decreasing sats    []      COGNITION/  PERCEPTION: Intact Impaired (Comments):   Orientation [x]     Vision [x]     Hearing [x]     Cognition  [x]       MOBILITY: I Mod I S SBA CGA Min Mod Max Total  NT x2 Comments:   Bed Mobility    Rolling [] [] [] [] [] [] [] [] [] [] []    Supine to Sit [] [] [] [x] [] [] [] [] [] [] []    Scooting [] [] [] [x] [] [] [] [] [] [] []    Sit to Supine [] [] [] [] [] [] [x] [] [] [] []    Transfers    Sit to Stand [] [] [] [] [x] [] [] [] [] [] []    Bed to Chair [] [] [] [] [] [] [] [] [] [] []    Stand to Sit [] [] [] [] [x] [] [] [] [] [] []     [] [] [] [] [] [] [] [] [] [] []    I=Independent, Mod I=Modified Independent, S=Supervision, SBA=Standby Assistance, CGA=Contact Guard Assistance,   Min=Minimal Assistance, Mod=Moderate Assistance, Max=Maximal Assistance, Total=Total Assistance, NT=Not Tested    GAIT: I Mod I S SBA CGA Min Mod Max Total  NT x2 Comments:   Level of Assistance [] [] [] [] [] [] [] [] [] [x] [] Not medically stable to ambulate   Distance   feet    DME N/A    Gait Quality N/A    Weightbearing Status      Stairs      I=Independent, Mod I=Modified Independent, S=Supervision, SBA=Standby Assistance, CGA=Contact Guard Assistance,   Min=Minimal Assistance, Mod=Moderate Assistance, Max=Maximal Assistance, Total=Total Assistance, NT=Not Tested    PLAN:   [address]: 5 times/week for duration of hospital stay or until stated goals are met, whichever comes first.    THERAPY PROGNOSIS: Good    PROBLEM LIST:   (Skilled intervention is medically necessary to address:)  Decreased ADL/Functional Activities  Decreased Activity Tolerance  Decreased Balance  Decreased Gait Ability  Decreased Strength  Decreased Transfer Abilities INTERVENTIONS PLANNED:   (Benefits and precautions of physical therapy have been discussed with the patient.)  Therapeutic Activity  Therapeutic Exercise/HEP  Gait Training  Education       TREATMENT:   EVALUATION: MODERATE COMPLEXITY: (Untimed Charge)    TREATMENT:   Therapeutic Activity (45 Minutes): Therapeutic activity included Supine to Sit, Sit to Supine, Scooting, Transfer Training, Sitting balance , and Standing balance to improve functional Activity tolerance, Balance, Coordination, Mobility, and Strength. TREATMENT GRID:  N/A    AFTER TREATMENT PRECAUTIONS: Bed, Bed/Chair Locked, Call light within reach, Needs within reach, RN at bedside, and Visitors at bedside    INTERDISCIPLINARY COLLABORATION:  MD/ PA/ NP , RN/ PCT, and RT    EDUCATION: Education Given To: Patient; Family  Education Provided: Role of Therapy;Plan of Care;Transfer Training  Education Method: Verbal  Education Outcome: Verbalized understanding    TIME IN/OUT:  Time In: Irma [address] 13  Time Out: 6800  Minutes: [facility],

## 2023-01-01 NOTE — PROGRESS NOTES
Advance Care Planning   Advance Care Planning Note  Ambulatory Spiritual Care Services    Date:  12/30/2022    Received request from patient. Consultation conversation participants:   Patient who understands ACP conversation     Goals of ACP Conversation:  Discuss advance care planning documents    Health Care Decision Makers:      Primary Decision Maker: Diaz Cruz - Brother/Sister - [phone]    Secondary Decision Maker: jorge a mckeon - Child - [phone]   Summary:  Completed New Documents    Advance Care Planning Documents (Patient Wishes)  Currently on file:   Healthcare Power of /Advance Directive Appointment of Health Care Agent    Assessment:       Interventions:  Assisted in the completion of documents according to patient's wishes at this time    Care Preferences Communicated:   No    Outcomes:  ACP Discussion: Completed    Patient / Healthcare Decision Maker Instructions:  Return a copy of Advance Directive Form(s) to medical office.     Electronically signed by Chaplain Ramo on 1/1/2023 at 5:33 PM.

## 2023-01-01 NOTE — PROGRESS NOTES
Advance Care Planning     Advance Care Planning Inpatient Note  [facility] Department    Today's Date: 1/1/2023  Unit: SFE 3 ICU    Received request from patient. Upon review of chart and communication with care team, patient's decision making abilities are not in question. . Patient was/were present in the room during visit.     Goals of ACP Conversation:  Discuss advance care planning documents    Health Care Decision Makers:       Primary Decision Maker: Lynmarybrigido Hernandezkrish - Brother/Sister - [phone]    Secondary Decision Maker: jorge a mckeon - Child - [phone]  Summary:  Completed New Documents    Advance Care Planning Documents (Patient Wishes):  Healthcare Power of /Advance Directive Appointment of Health Care Agent     Assessment:      Interventions:  Assisted in the completion of documents according to patient's wishes at this time    Care Preferences Communicated:   No    Outcomes/Plan:  ACP Discussion: Completed    Electronically signed by Chaplain Jin on 1/1/2023 at 5:32 PM

## 2023-01-01 NOTE — PROGRESS NOTES
Patient unable to tolerate standing placed back in bed and then put the bed in chair position. Increased to 10 Liters pf O2.   Increased ectopy multifocal Island Pedicle Flap Text: The defect edges were debeveled with a #15 scalpel blade.  Given the location of the defect, shape of the defect and the proximity to free margins an island pedicle advancement flap was deemed most appropriate.  Using a sterile surgical marker, an appropriate advancement flap was drawn incorporating the defect, outlining the appropriate donor tissue and placing the expected incisions within the relaxed skin tension lines where possible.    The area thus outlined was incised deep to adipose tissue with a #15 scalpel blade.  The skin margins were undermined to an appropriate distance in all directions around the primary defect and laterally outward around the island pedicle utilizing iris scissors.  There was minimal undermining beneath the pedicle flap.

## 2023-01-01 NOTE — PROGRESS NOTES
PT here preparing to stand. He is trying.  Legs wrapped per MD for light compression to mobilize fluid

## 2023-01-01 NOTE — PROGRESS NOTES
Pt was calm    Family present    Inquired about advance directives    Family had paperwork in room    Went over instructions with them    Family did not want to complete during visit    Informed family on how to contact  if needed    prayer

## 2023-01-01 NOTE — PROGRESS NOTES
Duleral inhaler used. Patient is short of breath  Iincreased O2 to 6 liters. Standup with use of walker.

## 2023-01-01 NOTE — PROGRESS NOTES
Hospitalist Progress Note   Admit Date:  2022  3:23 PM   Name:  [facility]   Age:  78 y.o. Sex:  male  :  1955   MRN:  [id number]   Room:  [facility]    Presenting Complaint: Abnormal Lab     Reason(s) for Admission: Elevated troponin [R77.8]  Acute on chronic systolic congestive heart failure (HCC) [I50.23]  Acute renal failure, unspecified acute renal failure type ([address] [N17.9]  Stage 2 acute kidney injury ([facility] [N17.9]  Congestive heart failure, unspecified HF chronicity, unspecified heart failure type ([facility] [address] [I50.9]     Hospital Course:   78 y.o. male with medical history of CKD3a, HFrEF, interstitial lung disease who presented with worsening shortness of breath and swelling. He has had chronic swelling and shortness of breath intermittently for several months. His EF was 20-25% at last check in November. He was at a doctor's appointment  and labs were drawn. When they resulted his provider directed him to the emergency department for evaluation. He was found to have an acute kidney injury, volume overload, and elevation of transaminases. He was admitted to the ICU for further evaluation and management. Subjective & 24hr Events (23): Repositioned at beginning of shift to help with dependent edema, applied leg wraps and quickly became severely hypoxic. Placed on NRB. Could not tolerate imaging to evaluate for mobilized clot to lungs, so started heparin empiricly. His inotrope was increased as was his bumetanide rate was increased. His MAPs pamela to the 80s and his urine output increased. After a few hours of diuresis and heparin he was able to wean 8L HFNC. Seen multiple times. Discussed with sister and niece (who is a nurse at [employer]).     Assessment & Plan:   Stage 2 acute kidney injury in the setting of chronic kidney disease, stage 3a  Baseline GFR 45-50, sCr doubled since last check  -milrinone gtt for improved profusion  -albumin for edema mobilization  1/1/2023: increased bumetanide, improvement in UOP. Albumin for fluid mobilization. Acute respiratory failure with hypoxia  Unable to tolerate imaging, starting heparin empirically due to risk created by profound immobility  -maintain O2 sat > 88 for COPD  -heparin gtt titration     Chronic combined systolic and diastolic congestive heart failure with volume overload  Likely in a chronic state of mild exacerbation due to profound comorbid disease. Echocardiogram with reduced ejection fraction from prior  -bumetanide gtt  -metoprolol  -mercado catheter  -hold ACE-I for BRYON  -Consult cardiology   -jose hose  1/1/2023: increased inotropic agent with MAP improved to 80s-90s. Demand ischemia of myocardium   No chest pain, ECG stable on admission  -trend  -serial ECG prn  1/1/2023: Appears stable, no chest pain     Interstitial lung disease  -quylska-xryhfiopbv-xwyrolmhkd     Class 1 obesity due to excess calories with serious comorbidity and body mass index (BMI) of 34.0 to 34.9 in adult  Increased risk of all cause mortality, complicating care  - healthy lifestyle at discharge     Patient is critically ill. Without intervention, there is a high probability of acute organ impairment or life-threatening deterioration. Critical care interventions: see plan of care  Total critical care time spent: 52 minutes. Time is indicative of direct patient attendance at bedside and on the patient's floor nearby. Includes time spent at bedside performing history and exam, performing chart review, discussing findings and treatment plan with patient and/or family, discussing patient with nursing staff, consultants and colleagues, and ordering/reviewing pertinent laboratory and radiographic evaluations. Time excludes procedures. CPT:  00544: First 30-74 minutes  69966: Each block of 30 min. beyond 76         Anticipated discharge needs: To be determined    Diet:  ADULT DIET; Regular;  No Added Salt (3-4 gm)  DVT PPx: Heparin  Code status: DNR    Hospital Problems:  Principal Problem:    Stage 2 acute kidney injury ([facility] [address]  Active Problems:    Demand ischemia of myocardium (HCC)    Chronic kidney disease, stage 3a (HCC)    Chronic combined systolic and diastolic congestive heart failure (HCC)    ILD (interstitial lung disease) ([facility] [address]    Hyperlipidemia    Class 1 obesity due to excess calories with serious comorbidity and body mass index (BMI) of 34.0 to 34.9 in adult  Resolved Problems:    * No resolved hospital problems. *      Objective:   Patient Vitals for the past 24 hrs:   Temp Pulse Resp BP SpO2   01/01/23 0700 -- 78 29 91/65 94 %   01/01/23 0600 -- 75 18 92/68 --   01/01/23 0500 -- 77 15 94/70 --   01/01/23 0421 96.8 °F (36 °C) -- -- -- --   01/01/23 0400 -- 77 15 89/70 --   01/01/23 0300 -- 78 16 86/67 --   01/01/23 0200 -- 79 16 86/64 --   01/01/23 0100 -- 79 17 91/66 --   01/01/23 0023 97.1 °F (36.2 °C) -- -- -- --   01/01/23 0000 -- 80 19 (!) 83/59 --   12/31/22 2300 -- 81 17 87/62 --   12/31/22 2200 -- 78 18 85/63 --   12/31/22 2100 -- 80 -- 88/71 --   12/31/22 2029 97.6 °F (36.4 °C) -- -- -- --   12/31/22 2000 -- 79 18 93/74 --   12/31/22 1900 -- 82 23 95/74 --   12/31/22 1500 97.5 °F (36.4 °C) 81 21 93/69 97 %   12/31/22 1119 97.7 °F (36.5 °C) 77 21 83/68 98 %   12/31/22 0742 -- 85 15 -- --         Oxygen Therapy  SpO2: 94 %  Pulse Oximetry Type: Continuous  Pulse via Oximetry: 85 beats per minute  Pulse Oximeter Device Mode: Continuous  O2 Device: Nasal cannula  O2 Flow Rate (L/min): 2 L/min    Estimated body mass index is 34.92 kg/m² as calculated from the following:    Height as of this encounter: 6' (1.829 m). Weight as of this encounter: 257 lb 8 oz (116.8 kg).     Intake/Output Summary (Last 24 hours) at 1/1/2023 0723  Last data filed at 1/1/2023 0421  Gross per 24 hour   Intake 5 ml   Output 1060 ml   Net -1055 ml         Blood pressure 91/65, pulse 78, temperature 96.8 °F (36 °C), temperature source Axillary, resp. rate 29, height 6' (1.829 m), weight 257 lb 8 oz (116.8 kg), SpO2 94 %. Physical Exam  Vitals and nursing note reviewed. Constitutional:       General: He is in acute distress. Appearance: He is obese. He is ill-appearing. He is not diaphoretic. Interventions: Nasal cannula and face mask in place. Comments: Seen multiple times   HENT:      Head: Normocephalic and atraumatic. Eyes:      Extraocular Movements: Extraocular movements intact. Cardiovascular:      Rate and Rhythm: Normal rate. Heart sounds: Murmur heard. Comments: Edema to mid abdomen  Pulmonary:      Effort: Respiratory distress (severe) present. Breath sounds: No rales. Comments: clubbing  Abdominal:      General: There is no distension. Musculoskeletal:         General: No deformity. Right lower leg: Edema present. Left lower leg: Edema present. Skin:     Coloration: Skin is not jaundiced or pale. Neurological:      General: No focal deficit present. Mental Status: He is alert and oriented to person, place, and time. Psychiatric:         Mood and Affect: Mood normal.         Behavior: Behavior normal. Behavior is cooperative.       Comments: pleasant         I have personally reviewed labs and tests showing:  Recent Labs:  Recent Results (from the past 48 hour(s))   CBC with Auto Differential    Collection Time: 12/30/22  2:18 PM   Result Value Ref Range    WBC 6.6 4.3 - 11.1 K/uL    RBC 5.08 4.23 - 5.6 M/uL    Hemoglobin 16.5 13.6 - 17.2 g/dL    Hematocrit 50.4 (H) 41.1 - 50.3 %    MCV 99.2 82.0 - 102.0 FL    MCH 32.5 26.1 - 32.9 PG    MCHC 32.7 31.4 - 35.0 g/dL    RDW 15.4 (H) 11.9 - 14.6 %    Platelets 109 (L) 149 - 450 K/uL    MPV 10.2 9.4 - 12.3 FL    nRBC 0.00 0.0 - 0.2 K/uL    Differential Type AUTOMATED      Seg Neutrophils 75 43 - 78 %    Lymphocytes 17 13 - 44 %    Monocytes 7 4.0 - 12.0 %    Eosinophils % 0 (L) 0.5 - 7.8 %    Basophils 0 0.0 - 2.0 %    Immature Granulocytes 0 0.0 - 5.0 %    Segs Absolute 5.0 1.7 - 8.2 K/UL    Absolute Lymph # 1.1 0.5 - 4.6 K/UL    Absolute Mono # 0.5 0.1 - 1.3 K/UL    Absolute Eos # 0.0 0.0 - 0.8 K/UL    Basophils Absolute 0.0 0.0 - 0.2 K/UL    Absolute Immature Granulocyte 0.0 0.0 - 0.5 K/UL   Comprehensive Metabolic Panel    Collection Time: 12/30/22  2:18 PM   Result Value Ref Range    Sodium 137 133 - 143 mmol/L    Potassium 4.4 3.5 - 5.1 mmol/L    Chloride 101 101 - 110 mmol/L    CO2 26 21 - 32 mmol/L    Anion Gap 10 2 - 11 mmol/L    Glucose 105 (H) 65 - 100 mg/dL    BUN 33 (H) 8 - 23 MG/DL    Creatinine 2.43 (H) 0.8 - 1.5 MG/DL    Est, Glom Filt Rate 29 (L) >60 ml/min/1.73m2    Calcium 9.4 8.3 - 10.4 MG/DL    Total Bilirubin 5.3 (H) 0.2 - 1.1 MG/DL    ALT 76 (H) 12 - 65 U/L    AST 79 (H) 15 - 37 U/L    Alk Phosphatase 203 (H) 50 - 136 U/L    Total Protein 6.8 6.3 - 8.2 g/dL    Albumin 3.0 (L) 3.2 - 4.6 g/dL    Globulin 3.8 2.8 - 4.5 g/dL    Albumin/Globulin Ratio 0.8 0.4 - 1.6     Brain Natriuretic Peptide    Collection Time: 12/30/22  2:18 PM   Result Value Ref Range    NT Pro-BNP 26,455 (H) 5 - 125 PG/ML   Troponin    Collection Time: 12/30/22  2:18 PM   Result Value Ref Range    Troponin, High Sensitivity 178.2 (HH) 0 - 14 pg/mL   EKG 12 Lead    Collection Time: 12/30/22  2:18 PM   Result Value Ref Range    Ventricular Rate 89 BPM    Atrial Rate 89 BPM    P-R Interval 230 ms    QRS Duration 88 ms    Q-T Interval 352 ms    QTc Calculation (Bazett) 428 ms    P Axis 60 degrees    R Axis -75 degrees    T Axis 55 degrees    Diagnosis       Sinus rhythm with 1st degree A-V block with Premature atrial complexes   Troponin    Collection Time: 12/30/22  5:23 PM   Result Value Ref Range    Troponin, High Sensitivity 198.9 (HH) 0 - 14 pg/mL   Basic Metabolic Panel    Collection Time: 12/31/22  4:20 AM   Result Value Ref Range    Sodium 134 133 - 143 mmol/L    Potassium 4.0 3.5 - 5.1 mmol/L    Chloride 102 101 - 110 mmol/L CO2 23 21 - 32 mmol/L    Anion Gap 9 2 - 11 mmol/L    Glucose 100 65 - 100 mg/dL    BUN 36 (H) 8 - 23 MG/DL    Creatinine 2.14 (H) 0.8 - 1.5 MG/DL    Est, Glom Filt Rate 33 (L) >60 ml/min/1.73m2    Calcium 9.7 8.3 - 10.4 MG/DL   Lipid Panel    Collection Time: 12/31/22  4:20 AM   Result Value Ref Range    Cholesterol, Total 66 <200 MG/DL    Triglycerides 78 35 - 150 MG/DL    HDL 19 (L) 40 - 60 MG/DL    LDL Calculated 31.4 <100 MG/DL    VLDL Cholesterol Calculated 15.6 6.0 - 23.0 MG/DL    Chol/HDL Ratio 3.5     Phosphorus    Collection Time: 12/31/22  4:20 AM   Result Value Ref Range    Phosphorus 2.9 2.3 - 3.7 MG/DL   CBC    Collection Time: 12/31/22  4:20 AM   Result Value Ref Range    WBC 6.6 4.3 - 11.1 K/uL    RBC 4.50 4.23 - 5.6 M/uL    Hemoglobin 14.5 13.6 - 17.2 g/dL    Hematocrit 43.5 41.1 - 50.3 %    MCV 96.7 82.0 - 102.0 FL    MCH 32.2 26.1 - 32.9 PG    MCHC 33.3 31.4 - 35.0 g/dL    RDW 15.3 (H) 11.9 - 14.6 %    Platelets 93 (L) 111 - 450 K/uL    MPV 10.0 9.4 - 12.3 FL    nRBC 0.00 0.0 - 0.2 K/uL   TSH with Reflex    Collection Time: 12/31/22  4:20 AM   Result Value Ref Range    TSH w Free Thyroid if Abnormal 2.24 0.358 - 3.740 UIU/ML   Hemoglobin A1C    Collection Time: 12/31/22  4:20 AM   Result Value Ref Range    Hemoglobin A1C 6.3 (H) 4.8 - 5.6 %    eAG 134 mg/dL   Procalcitonin    Collection Time: 12/31/22  4:20 AM   Result Value Ref Range    Procalcitonin 0.41 0.00 - 0.49 ng/mL   Hepatic Function Panel    Collection Time: 12/31/22  4:20 AM   Result Value Ref Range    Total Protein 6.0 (L) 6.3 - 8.2 g/dL    Albumin 3.0 (L) 3.2 - 4.6 g/dL    Globulin 3.0 2.8 - 4.5 g/dL    Albumin/Globulin Ratio 1.0 0.4 - 1.6      Total Bilirubin 4.8 (H) 0.2 - 1.1 MG/DL    Bilirubin, Direct 3.2 (H) <0.4 MG/DL    Alk Phosphatase 183 (H) 50 - 136 U/L    AST 67 (H) 15 - 37 U/L    ALT 65 12 - 65 U/L   Magnesium    Collection Time: 12/31/22  4:20 AM   Result Value Ref Range    Magnesium 2.4 1.8 - 2.4 mg/dL   Transthoracic echocardiogram (TTE) limited with contrast, bubble, strain, and 3D PRN    Collection Time: 12/31/22  8:45 AM   Result Value Ref Range    LV EDV A2C 175 mL    LV EDV A4C 231 mL    LV ESV A2C 133 mL    LV ESV A4C 182 mL    IVSd 0.8 0.6 - 1.0 cm    LVIDd 6.7 (A) 4.2 - 5.9 cm    LVIDs 6.2 cm    LVPWd 0.9 0.6 - 1.0 cm    LV Ejection Fraction A2C 24 %    LV Ejection Fraction A4C 22 %    EF BP 21 (A) 55 - 100 %    AR .0 ms    AR Max Velocity PISA 3.8 m/s    AV Peak Velocity 2.1 m/s    AV Peak Gradient 17 mmHg    AV Mean Gradient 11 mmHg    AV VTI 40.8 cm    AV Mean Velocity 1.6 m/s    IVC Expiration 2.6 cm    LA Diameter 4.3 cm    Body Surface Area 2.4 m2    Fractional Shortening 2D 7 28 - 44 %    LV ESV Index A4C 78 mL/m2    LV EDV Index A4C 99 mL/m2    LV ESV Index A2C 57 mL/m2    LV EDV Index A2C 75 mL/m2    LVIDd Index 2.86 cm/m2    LVIDs Index 2.65 cm/m2    LV RWT Ratio 0.27     LV Mass 2D 243.5 (A) 88 - 224 g    LV Mass 2D Index 104.1 49 - 115 g/m2    LA Size Index 1.84 cm/m2   Basic Metabolic Panel    Collection Time: 01/01/23  3:15 AM   Result Value Ref Range    Sodium 134 133 - 143 mmol/L    Potassium 4.0 3.5 - 5.1 mmol/L    Chloride 101 101 - 110 mmol/L    CO2 25 21 - 32 mmol/L    Anion Gap 8 2 - 11 mmol/L    Glucose 113 (H) 65 - 100 mg/dL    BUN 38 (H) 8 - 23 MG/DL    Creatinine 2.27 (H) 0.8 - 1.5 MG/DL    Est, Glom Filt Rate 31 (L) >60 ml/min/1.73m2    Calcium 9.2 8.3 - 10.4 MG/DL   Phosphorus    Collection Time: 01/01/23  3:15 AM   Result Value Ref Range    Phosphorus 2.9 2.3 - 3.7 MG/DL   CBC    Collection Time: 01/01/23  3:15 AM   Result Value Ref Range    WBC 6.4 4.3 - 11.1 K/uL    RBC 4.44 4.23 - 5.6 M/uL    Hemoglobin 14.3 13.6 - 17.2 g/dL    Hematocrit 43.1 41.1 - 50.3 %    MCV 97.1 82.0 - 102.0 FL    MCH 32.2 26.1 - 32.9 PG    MCHC 33.2 31.4 - 35.0 g/dL    RDW 15.3 (H) 11.9 - 14.6 %    Platelets 81 (L) 498 - 450 K/uL    MPV 10.9 9.4 - 12.3 FL    nRBC 0.00 0.0 - 0.2 K/uL   Procalcitonin Collection Time: 01/01/23  3:15 AM   Result Value Ref Range    Procalcitonin 0.48 0.00 - 0.49 ng/mL   Hepatic Function Panel    Collection Time: 01/01/23  3:15 AM   Result Value Ref Range    Total Protein 6.1 (L) 6.3 - 8.2 g/dL    Albumin 3.2 3.2 - 4.6 g/dL    Globulin 2.9 2.8 - 4.5 g/dL    Albumin/Globulin Ratio 1.1 0.4 - 1.6      Total Bilirubin 4.9 (H) 0.2 - 1.1 MG/DL    Bilirubin, Direct 3.0 (H) <0.4 MG/DL    Alk Phosphatase 166 (H) 50 - 136 U/L    AST 56 (H) 15 - 37 U/L    ALT 62 12 - 65 U/L   Magnesium    Collection Time: 01/01/23  3:15 AM   Result Value Ref Range    Magnesium 2.0 1.8 - 2.4 mg/dL       I have personally reviewed imaging studies showing: Other Studies:  US ABDOMEN LIMITED Specify organ? GALLBLADDER, LIVER   Final Result   1. No evidence of bile duct obstruction. 2.  Cholelithiasis and gallbladder wall thickening. Gallbladder wall thickening   could be due to inflammation or adjacent liver disease. 3.  Ascites. XR CHEST (2 VW)   Final Result   Cardiomegaly and pulmonary vascular congestion.                    Current Meds:  Current Facility-Administered Medications   Medication Dose Route Frequency    nystatin (MYCOSTATIN) powder   Topical BID    Medela Tender Care Lanolin cream   Topical PRN    aspirin EC tablet 81 mg  81 mg Oral Daily    atorvastatin (LIPITOR) tablet 80 mg  80 mg Oral Nightly    [Held by provider] metoprolol succinate (TOPROL XL) extended release tablet 25 mg  25 mg Oral Daily    spironolactone (ALDACTONE) tablet 25 mg  25 mg Oral Daily    sodium chloride flush 0.9 % injection 5-40 mL  5-40 mL IntraVENous 2 times per day    sodium chloride flush 0.9 % injection 5-40 mL  5-40 mL IntraVENous PRN    0.9 % sodium chloride infusion   IntraVENous PRN    ondansetron (ZOFRAN-ODT) disintegrating tablet 4 mg  4 mg Oral Q8H PRN    Or    ondansetron (ZOFRAN) injection 4 mg  4 mg IntraVENous Q6H PRN    polyethylene glycol (GLYCOLAX) packet 17 g  17 g Oral Daily PRN acetaminophen (TYLENOL) tablet 650 mg  650 mg Oral Q6H PRN    Or    acetaminophen (TYLENOL) suppository 650 mg  650 mg Rectal Q6H PRN    bumetanide (BUMEX) 12.5 mg in 50 mL infusion  0.2 mg/hr IntraVENous Continuous    potassium chloride (KLOR-CON M) extended release tablet 40 mEq  40 mEq Oral PRN    Or    potassium bicarb-citric acid (EFFER-K) effervescent tablet 40 mEq  40 mEq Oral PRN    Or    potassium chloride 10 mEq/100 mL IVPB (Peripheral Line)  10 mEq IntraVENous PRN    magnesium sulfate 2000 mg in 50 mL IVPB premix  2,000 mg IntraVENous PRN    sodium phosphate 10 mmol in sodium chloride 0.9 % 250 mL IVPB  10 mmol IntraVENous PRN    Or    sodium phosphate 15 mmol in sodium chloride 0.9 % 250 mL IVPB  15 mmol IntraVENous PRN    Or    sodium phosphate 20 mmol in sodium chloride 0.9 % 500 mL IVPB  20 mmol IntraVENous PRN    heparin (porcine) injection 5,000 Units  5,000 Units SubCUTAneous 3 times per day    DOPamine (INTROPIN) 800 mg in dextrose 5 % 250 mL infusion  1-20 mcg/kg/min IntraVENous Continuous       Signed:  Kristi Sweeney MD  =

## 2023-01-02 ENCOUNTER — HOSPICE ADMISSION (OUTPATIENT)
Dept: HOSPICE | Facility: HOSPICE | Age: 68
End: 2023-01-02
Payer: COMMERCIAL

## 2023-01-02 ENCOUNTER — APPOINTMENT (OUTPATIENT)
Dept: GENERAL RADIOLOGY | Age: 68
DRG: 682 | End: 2023-01-02
Payer: MEDICARE

## 2023-01-02 ENCOUNTER — APPOINTMENT (OUTPATIENT)
Dept: ULTRASOUND IMAGING | Age: 68
DRG: 682 | End: 2023-01-02
Payer: MEDICARE

## 2023-01-02 LAB
ALBUMIN SERPL-MCNC: 3.3 G/DL (ref 3.2–4.6)
ALBUMIN/GLOB SERPL: 1.2 {RATIO} (ref 0.4–1.6)
ALP SERPL-CCNC: 153 U/L (ref 50–136)
ALT SERPL-CCNC: 64 U/L (ref 12–65)
ANION GAP SERPL CALC-SCNC: 10 MMOL/L (ref 2–11)
AST SERPL-CCNC: 79 U/L (ref 15–37)
BILIRUB SERPL-MCNC: 5 MG/DL (ref 0.2–1.1)
BUN SERPL-MCNC: 43 MG/DL (ref 8–23)
CALCIUM SERPL-MCNC: 9.2 MG/DL (ref 8.3–10.4)
CHLORIDE SERPL-SCNC: 100 MMOL/L (ref 101–110)
CO2 SERPL-SCNC: 23 MMOL/L (ref 21–32)
CREAT SERPL-MCNC: 2.39 MG/DL (ref 0.8–1.5)
ERYTHROCYTE [DISTWIDTH] IN BLOOD BY AUTOMATED COUNT: 15.6 % (ref 11.9–14.6)
GLOBULIN SER CALC-MCNC: 2.8 G/DL (ref 2.8–4.5)
GLUCOSE SERPL-MCNC: 129 MG/DL (ref 65–100)
HCT VFR BLD AUTO: 43 % (ref 41.1–50.3)
HGB BLD-MCNC: 14.1 G/DL (ref 13.6–17.2)
MCH RBC QN AUTO: 33.1 PG (ref 26.1–32.9)
MCHC RBC AUTO-ENTMCNC: 32.8 G/DL (ref 31.4–35)
MCV RBC AUTO: 100.9 FL (ref 82–102)
NRBC # BLD: 0.02 K/UL (ref 0–0.2)
NT PRO BNP: ABNORMAL PG/ML (ref 5–125)
PHOSPHATE SERPL-MCNC: 3.5 MG/DL (ref 2.3–3.7)
PLATELET # BLD AUTO: 64 K/UL (ref 150–450)
PMV BLD AUTO: 11.2 FL (ref 9.4–12.3)
POTASSIUM SERPL-SCNC: 3.8 MMOL/L (ref 3.5–5.1)
PROCALCITONIN SERPL-MCNC: 0.95 NG/ML (ref 0–0.49)
PROT SERPL-MCNC: 6.1 G/DL (ref 6.3–8.2)
RBC # BLD AUTO: 4.26 M/UL (ref 4.23–5.6)
SODIUM SERPL-SCNC: 133 MMOL/L (ref 133–143)
UFH PPP CHRO-ACNC: 0.61 IU/ML (ref 0.3–0.7)
UFH PPP CHRO-ACNC: 1.08 IU/ML (ref 0.3–0.7)
UFH PPP CHRO-ACNC: >1.1 IU/ML (ref 0.3–0.7)
WBC # BLD AUTO: 6.5 K/UL (ref 4.3–11.1)

## 2023-01-02 PROCEDURE — 71045 X-RAY EXAM CHEST 1 VIEW: CPT

## 2023-01-02 PROCEDURE — 6360000002 HC RX W HCPCS: Performed by: FAMILY MEDICINE

## 2023-01-02 PROCEDURE — P9047 ALBUMIN (HUMAN), 25%, 50ML: HCPCS | Performed by: FAMILY MEDICINE

## 2023-01-02 PROCEDURE — 6370000000 HC RX 637 (ALT 250 FOR IP): Performed by: FAMILY MEDICINE

## 2023-01-02 PROCEDURE — 2580000003 HC RX 258: Performed by: FAMILY MEDICINE

## 2023-01-02 PROCEDURE — 85520 HEPARIN ASSAY: CPT

## 2023-01-02 PROCEDURE — 94761 N-INVAS EAR/PLS OXIMETRY MLT: CPT

## 2023-01-02 PROCEDURE — 83880 ASSAY OF NATRIURETIC PEPTIDE: CPT

## 2023-01-02 PROCEDURE — 93970 EXTREMITY STUDY: CPT

## 2023-01-02 PROCEDURE — 2000000000 HC ICU R&B

## 2023-01-02 PROCEDURE — 84100 ASSAY OF PHOSPHORUS: CPT

## 2023-01-02 PROCEDURE — 36415 COLL VENOUS BLD VENIPUNCTURE: CPT

## 2023-01-02 PROCEDURE — 80053 COMPREHEN METABOLIC PANEL: CPT

## 2023-01-02 PROCEDURE — 85027 COMPLETE CBC AUTOMATED: CPT

## 2023-01-02 PROCEDURE — 2700000000 HC OXYGEN THERAPY PER DAY

## 2023-01-02 PROCEDURE — 84145 PROCALCITONIN (PCT): CPT

## 2023-01-02 RX ORDER — BUMETANIDE 1 MG/1
2 TABLET ORAL 2 TIMES DAILY
Status: DISCONTINUED | OUTPATIENT
Start: 2023-01-02 | End: 2023-01-03 | Stop reason: HOSPADM

## 2023-01-02 RX ORDER — DIGOXIN 125 MCG
125 TABLET ORAL DAILY
Status: DISCONTINUED | OUTPATIENT
Start: 2023-01-02 | End: 2023-01-03 | Stop reason: HOSPADM

## 2023-01-02 RX ADMIN — ASPIRIN 81 MG: 81 TABLET, COATED ORAL at 09:27

## 2023-01-02 RX ADMIN — APIXABAN 10 MG: 5 TABLET, FILM COATED ORAL at 21:36

## 2023-01-02 RX ADMIN — NYSTATIN: 100000 POWDER TOPICAL at 21:35

## 2023-01-02 RX ADMIN — BUMETANIDE 2 MG: 1 TABLET ORAL at 10:58

## 2023-01-02 RX ADMIN — ALBUMIN (HUMAN) 12.5 G: 0.25 INJECTION, SOLUTION INTRAVENOUS at 00:25

## 2023-01-02 RX ADMIN — BUMETANIDE 2 MG: 1 TABLET ORAL at 21:36

## 2023-01-02 RX ADMIN — APIXABAN 10 MG: 5 TABLET, FILM COATED ORAL at 10:58

## 2023-01-02 RX ADMIN — ATORVASTATIN CALCIUM 80 MG: 40 TABLET, FILM COATED ORAL at 21:36

## 2023-01-02 RX ADMIN — SODIUM CHLORIDE, PRESERVATIVE FREE 5 ML: 5 INJECTION INTRAVENOUS at 21:35

## 2023-01-02 RX ADMIN — DOPAMINE HYDROCHLORIDE 3 MCG/KG/MIN: 320 INJECTION, SOLUTION INTRAVENOUS at 03:37

## 2023-01-02 RX ADMIN — SPIRONOLACTONE 25 MG: 25 TABLET ORAL at 09:27

## 2023-01-02 RX ADMIN — NYSTATIN: 100000 POWDER TOPICAL at 08:49

## 2023-01-02 RX ADMIN — HEPARIN SODIUM 17 UNITS/KG/HR: 10000 INJECTION, SOLUTION INTRAVENOUS at 00:52

## 2023-01-02 RX ADMIN — DIGOXIN 125 MCG: 125 TABLET ORAL at 11:39

## 2023-01-02 ASSESSMENT — PAIN SCALES - GENERAL: PAINLEVEL_OUTOF10: 0

## 2023-01-02 NOTE — PROGRESS NOTES
[facility]     Referral received and discussed with Krupa TAPIA. Hospice Liaison will contact patient/family for hospice presentation and evaluation. Thank you for this referral.   Shaun Rowell  [phone]: C  [phone]: O     Liaison met with Charlie Jones (sister) and nieces and nephews at bedside to discuss hospice philosophy of care, hospice team members and frequency of visits. We also discussed the [facility] for general inpatient care with symptom management and respite care. Answered all questions. Received approval for [facility] at home. Scheduling admission for 1/3/23 in afternoon. Liaison ordering equipment for home delivery.

## 2023-01-02 NOTE — PROGRESS NOTES
[facility]     Referral received and discussed with Krupa TAPIA. Hospice Liaison will contact patient/family for hospice presentation and evaluation. Thank you for this referral.     Liaison made appointment with sister Claudetta Breeze at 215 pm for evaluation and presentation. Shaun Dominguez  [phone]: C  [phone]:  O

## 2023-01-02 NOTE — CARE COORDINATION
RN CM received a phone call from the hospice liaison. The patient has been accepted for services. The liaison will contact case management once an acceptance date has been determined.

## 2023-01-02 NOTE — PROGRESS NOTES
Physical Therapy Note:    Attempted to see patient this AM&PM for physical therapy treatment  session. Patient deferred to PM, then declined PM session. Will follow and re-attempt as schedule permits/patient available.  Thank you,    Sebastián Smith PT     Rehab Caseload Tracker

## 2023-01-02 NOTE — PROGRESS NOTES
1/2/2023 4:11 PM    Admit Date: 12/30/2022    Admit Diagnosis: Elevated troponin [R77.8]  Acute on chronic systolic congestive heart failure (HCC) [I50.23]  Acute renal failure, unspecified acute renal failure type ([facility] [N17.9]  Stage 2 acute kidney injury ([facility] [address] [N17.9]  Congestive heart failure, unspecified HF chronicity, unspecified heart failure type ([address] [I50.9]      Subjective:   No chest pain or shortness of breath. Objective:    BP 91/66   Pulse 77   Temp 97.4 °F (36.3 °C) (Temporal)   Resp 18   Ht 6' (1.829 m)   Wt 253 lb (114.8 kg)   SpO2 100%   BMI 34.31 kg/m²     Physical Exam:  Qasim Priyank, Well Nourished, No Acute Distress, Alert & Oriented x 3, appropriate mood. Neck- supple, no JVD  CV- regular rate and rhythm no MRG  Lung- clear bilaterally  Abd- soft, nontender, nondistended  Ext- no edema bilaterally. Skin- warm and dry        Data Review:   Recent Labs     12/31/22  0420 01/01/23  0315 01/01/23  0928 01/02/23  0619      < >  --  133   K 4.0   < >  --  3.8   BUN 36*   < >  --  43*   WBC 6.6   < >  --  6.5   HGB 14.5   < >  --  14.1   HCT 43.5   < >  --  43.0   PLT 93*   < >  --  64*   INR  --   --  1.5  --    HDL 19*  --   --   --     < > = values in this interval not displayed. Assessment/Plan:     Active Hospital Problems    Congestive heart failure [facility]) patient desiring to go home with hospice. Creatinine increasing with low blood pressure and end-stage cardiomyopathy. We will sign off.       Elevated troponin      Nonrheumatic aortic valve insufficiency      Stage 2 acute kidney injury ([facility] [address]      Demand ischemia of myocardium (HCC)      Chronic kidney disease, stage 3a (HCC)      Chronic combined systolic and diastolic congestive heart failure (HCC)      ILD (interstitial lung disease) ([facility] [address]            Dr. Disha Gonsalez 1 obesity due to excess calories with serious comorbidity and body mass index (BMI) of 34.0 to 34.9 in adult Hyperlipidemia

## 2023-01-02 NOTE — PLAN OF CARE
Problem: Discharge Planning  Goal: Discharge to home or other facility with appropriate resources  Flowsheets (Taken 1/1/2023 2003)  Discharge to home or other facility with appropriate resources:   Identify barriers to discharge with patient and caregiver   Arrange for needed discharge resources and transportation as appropriate   Identify discharge learning needs (meds, wound care, etc)   Arrange for interpreters to assist at discharge as needed   Refer to discharge planning if patient needs post-hospital services based on physician order or complex needs related to functional status, cognitive ability or social support system     Problem: Safety - Adult  Goal: Free from fall injury  Flowsheets (Taken 1/2/2023 0002)  Free From Fall Injury:   Instruct family/caregiver on patient safety   Based on caregiver fall risk screen, instruct family/caregiver to ask for assistance with transferring infant if caregiver noted to have fall risk factors     Problem: ABCDS Injury Assessment  Goal: Absence of physical injury  Flowsheets (Taken 1/2/2023 0002)  Absence of Physical Injury: Implement safety measures based on patient assessment     Problem: Skin/Tissue Integrity  Goal: Absence of new skin breakdown  Description: 1. Monitor for areas of redness and/or skin breakdown  2. Assess vascular access sites hourly  3. Every 4-6 hours minimum:  Change oxygen saturation probe site  4. Every 4-6 hours:  If on nasal continuous positive airway pressure, respiratory therapy assess nares and determine need for appliance change or resting period.   Note: Patient will have no skin breakdown during admission       Problem: Chronic Conditions and Co-morbidities  Goal: Patient's chronic conditions and co-morbidity symptoms are monitored and maintained or improved  Flowsheets (Taken 1/1/2023 2003)  Care Plan - Patient's Chronic Conditions and Co-Morbidity Symptoms are Monitored and Maintained or Improved:   Monitor and assess patient's chronic conditions and comorbid symptoms for stability, deterioration, or improvement   Collaborate with multidisciplinary team to address chronic and comorbid conditions and prevent exacerbation or deterioration   Update acute care plan with appropriate goals if chronic or comorbid symptoms are exacerbated and prevent overall improvement and discharge     Problem: Respiratory - Adult  Goal: Achieves optimal ventilation and oxygenation  Flowsheets (Taken 1/1/2023 2003)  Achieves optimal ventilation and oxygenation:   Assess for changes in respiratory status   Assess for changes in mentation and behavior   Position to facilitate oxygenation and minimize respiratory effort   Oxygen supplementation based on oxygen saturation or arterial blood gases   Initiate smoking cessation protocol as indicated   Encourage broncho-pulmonary hygiene including cough, deep breathe, incentive spirometry   Assess the need for suctioning and aspirate as needed   Assess and instruct to report shortness of breath or any respiratory difficulty   Respiratory therapy support as indicated     Problem: Cardiovascular - Adult  Goal: Maintains optimal cardiac output and hemodynamic stability  Flowsheets (Taken 1/1/2023 2003)  Maintains optimal cardiac output and hemodynamic stability:   Monitor blood pressure and heart rate   Monitor urine output and notify Licensed Independent Practitioner for values outside of normal range   Assess for signs of decreased cardiac output   Administer fluid and/or volume expanders as ordered   Administer vasoactive medications as ordered  Goal: Absence of cardiac dysrhythmias or at baseline  Flowsheets (Taken 1/1/2023 2003)  Absence of cardiac dysrhythmias or at baseline:   Monitor cardiac rate and rhythm   Assess for signs of decreased cardiac output   Administer antiarrhythmia medication and electrolyte replacement as ordered     Problem: Cardiovascular - Adult  Goal: Absence of cardiac dysrhythmias or at baseline  Flowsheets (Taken 1/1/2023 2003)  Absence of cardiac dysrhythmias or at baseline:   Monitor cardiac rate and rhythm   Assess for signs of decreased cardiac output   Administer antiarrhythmia medication and electrolyte replacement as ordered     Problem: Pain  Goal: Verbalizes/displays adequate comfort level or baseline comfort level  Flowsheets  Taken 1/1/2023 2330  Verbalizes/displays adequate comfort level or baseline comfort level:   Encourage patient to monitor pain and request assistance   Assess pain using appropriate pain scale   Administer analgesics based on type and severity of pain and evaluate response   Implement non-pharmacological measures as appropriate and evaluate response   Consider cultural and social influences on pain and pain management   Notify Licensed Independent Practitioner if interventions unsuccessful or patient reports new pain  Taken 1/1/2023 2003  Verbalizes/displays adequate comfort level or baseline comfort level:   Encourage patient to monitor pain and request assistance   Assess pain using appropriate pain scale   Administer analgesics based on type and severity of pain and evaluate response   Implement non-pharmacological measures as appropriate and evaluate response   Consider cultural and social influences on pain and pain management   Notify Licensed Independent Practitioner if interventions unsuccessful or patient reports new pain

## 2023-01-02 NOTE — PROGRESS NOTES
Patient with increased 02 demands. Started shift off with 6 liters HF. Progressed to 100% AIRVO. 88% saturation on NRB mask added to AIRVO. Message sent to Dr. Brigitte Johnson concerning decline in oxygenation. STAT orders for ABG. Will monitor.

## 2023-01-02 NOTE — CARE COORDINATION
01/02/23 1133   Service Assessment   Patient Orientation Alert and Oriented   Cognition Alert   History Provided By Patient   Primary [place] Family Members   PCP Verified by CM Yes   Prior Functional Level Independent in ADLs/IADLs   Current Functional Level Independent in ADLs/IADLs   Can patient return to prior living arrangement Yes   Ability to make needs known: Good   Family able to assist with home care needs: Yes   Would you like for me to discuss the discharge plan with any other family members/significant others, and if so, who? Yes  (sister, Vanessa Galindo)   Financial Resources Medicare   Community Resources None   Social/Functional History   Lives With Family   Type of Home Apartment   Home Equipment [place], rolling;Cane   ADL Assistance Needs assistance   Mode of Transportation Car   Discharge Planning   Type of Residence Apartment   Living Arrangements Family Members  (sister)   Current Services Prior To Admission Durable Medical Equipment   Current DME Prior to [employer]   Potential Assistance Needed Other (Comment)  (hospice)   Potential Assistance Purchasing Medications No   Patient expects to be discharged to: Hospice (comment)   Services At/After Discharge   [facility] Consult (CM Consult) [address] [facility]   ([facility])   Condition of Participation: Discharge Planning   The Patient and/or Patient Representative was provided with a Choice of Provider? Patient   The Patient and/Or Patient Representative agree with the Discharge Plan? Yes   Freedom of Choice list was provided with basic dialogue that supports the patient's individualized plan of care/goals, treatment preferences, and shares the quality data associated with the providers? Yes     RN WILLIE met with the patient and his niece at the bedside and discussed discharge planning. He lives with his sister and she assists him with ADLs. He has DMEs and does not use external services.  He expressed an interested in hospice services and was provided with a list of hospice agencies and their quality metrics. He selected [facility]. A referral was sent to the hospice agency and is pending review. RN CM encouraged him to ask questions. Case management will continue to follow him for discharge planning needs.

## 2023-01-02 NOTE — CARE COORDINATION
Discharge planning was discussed with the Critical Care Interdisciplinary Team. The patient is requiring IV treatments and is not medically ready for discharge.  Discharge planning is pending the patient's clinical course in the hospital.

## 2023-01-02 NOTE — PROGRESS NOTES
Hospitalist Progress Note   Admit Date:  2022  3:23 PM   Name:  [facility]   Age:  78 y.o. Sex:  male  :  1955   MRN:  [id number]   Room:  [facility]    Presenting Complaint: Abnormal Lab     Reason(s) for Admission: Elevated troponin [R77.8]  Acute on chronic systolic congestive heart failure (HCC) [I50.23]  Acute renal failure, unspecified acute renal failure type ([facility] [N17.9]  Stage 2 acute kidney injury ([facility] [address] [N17.9]  Congestive heart failure, unspecified HF chronicity, unspecified heart failure type ([facility] [address] [I50.9]     Hospital Course:   78 y.o. male with medical history of CKD3a, HFrEF, interstitial lung disease who presented with worsening shortness of breath and swelling. He has had chronic swelling and shortness of breath intermittently for several months. His EF was 20-25% at last check in November. He was at a doctor's appointment  and labs were drawn. When they resulted his provider directed him to the emergency department for evaluation. He was found to have an acute kidney injury, volume overload, and elevation of transaminases. He was admitted to the ICU for further evaluation and management. Subjective & 24hr Events (23): Significant improvement in edema. Transitioning to oral bumetanide. Decreasing dopamine and adding digoxin. Transitioning apixaban from heparin. Hospice consult placed. Oxygen requirement still at 4 to 6 L. Patient feels okay. See ACP note for further discussion of hospice plan.       Assessment & Plan:   Stage 2 acute kidney injury in the setting of chronic kidney disease, stage 3a  Baseline GFR 45-50, sCr doubled since last check  -Stable  2023: Discontinuing drips     Acute respiratory failure with hypoxia  Unable to tolerate imaging, starting heparin empirically due to risk created by profound immobility  -maintain O2 sat > 88 for COPD  -heparin gtt titration discontinued  -Apixaban  2023: Now on 4 to 6 L    Chronic combined systolic and diastolic congestive heart failure with volume overload  Likely in a chronic state of mild exacerbation due to profound comorbid disease. Echocardiogram with reduced ejection fraction from prior  -bumetanide   -metoprolol  -mercado catheter  -hold ACE-I for BRYON  -Consult cardiology: end stage disease   1/2/2023: Transition to     Demand ischemia of myocardium   No chest pain, ECG stable on admission  -trend  -serial ECG prn  1/2/2023: Appears stable, no chest pain     Interstitial lung disease  -[id number]     Class 1 obesity due to excess calories with serious comorbidity and body mass index (BMI) of 34.0 to 34.9 in adult  Increased risk of all cause mortality, complicating care  - healthy lifestyle at discharge     Patient is critically ill. Without intervention, there is a high probability of acute organ impairment or life-threatening deterioration. Critical care interventions: see plan of care  Total critical care time spent: 42 minutes. Time is indicative of direct patient attendance at bedside and on the patient's floor nearby. Includes time spent at bedside performing history and exam, performing chart review, discussing findings and treatment plan with patient and/or family, discussing patient with nursing staff, consultants and colleagues, and ordering/reviewing pertinent laboratory and radiographic evaluations. Time excludes procedures. CPT:  09398: First 30-74 minutes  11764: Each block of 30 min. beyond 76         Anticipated discharge needs: To be determined    Diet:  ADULT DIET; Regular;  No Added Salt (3-4 gm); 2000 ml  DVT PPx: Heparin  Code status: DNR    Hospital Problems:  Principal Problem:    Stage 2 acute kidney injury ([facility] [address]  Active Problems:    Nonrheumatic aortic valve insufficiency    Congestive heart failure (HCC)    Elevated troponin    Demand ischemia of myocardium (HCC)    Chronic kidney disease, stage 3a ([address] Chronic combined systolic and diastolic congestive heart failure (HCC)    ILD (interstitial lung disease) (HCC)    Hyperlipidemia    Class 1 obesity due to excess calories with serious comorbidity and body mass index (BMI) of 34.0 to 34.9 in adult  Resolved Problems:    * No resolved hospital problems.  *      Objective:   Patient Vitals for the past 24 hrs:   Temp Pulse Resp BP SpO2   01/02/23 0543 -- 92 17 103/77 97 %   01/02/23 0530 -- -- -- -- 98 %   01/02/23 0528 -- 95 17 115/78 99 %   01/02/23 0513 -- 97 28 106/74 100 %   01/02/23 0458 -- 94 18 100/76 100 %   01/02/23 0443 -- 93 (!) 0 95/76 99 %   01/02/23 0428 -- 93 23 101/78 96 %   01/02/23 0413 -- 100 23 -- 98 %   01/02/23 0330 97.7 °F (36.5 °C) 100 13 106/76 100 %   01/02/23 0300 -- (!) 104 (!) 31 97/74 100 %   01/02/23 0230 -- 100 29 103/75 100 %   01/02/23 0200 -- (!) 102 22 100/80 100 %   01/02/23 0130 -- (!) 106 24 108/85 98 %   01/02/23 0100 -- (!) 105 21 104/83 99 %   01/02/23 0030 -- (!) 106 30 104/82 --   01/02/23 0019 -- 99 23 -- 96 %   01/02/23 0000 -- (!) 106 23 104/78 --   01/01/23 2330 98.5 °F (36.9 °C) (!) 107 (!) 33 107/83 94 %   01/01/23 2300 -- (!) 106 28 101/85 94 %   01/01/23 2230 -- (!) 106 25 104/81 92 %   01/01/23 2200 -- (!) 109 29 104/84 (!) 85 %   01/01/23 2130 -- (!) 107 23 102/80 (!) 86 %   01/01/23 2124 -- 100 20 -- 90 %   01/01/23 2104 -- 100 20 -- 93 %   01/01/23 2100 -- (!) 108 29 103/81 90 %   01/01/23 2030 -- (!) 107 (!) 33 99/80 (!) 89 %   01/01/23 2003 97.6 °F (36.4 °C) (!) 105 22 96/79 90 %   01/01/23 2000 -- (!) 106 23 -- --   01/01/23 1933 -- (!) 110 30 100/76 --   01/01/23 1930 -- (!) 112 (!) 33 -- --   01/01/23 1903 -- (!) 116 25 112/85 --   01/01/23 1900 -- (!) 115 (!) 33 -- --   01/01/23 1833 -- (!) 119 (!) 45 105/79 (!) 72 %   01/01/23 1803 -- (!) 110 (!) 37 106/82 --   01/01/23 1733 -- (!) 110 (!) 31 105/84 --   01/01/23 1703 -- (!) 111 (!) 38 101/85 --   01/01/23 1641 97.3 °F (36.3 °C) (!) 111 25 107/84 90 % 01/01/23 1633 -- (!) 109 19 107/84 --   01/01/23 1603 -- (!) 107 18 103/83 --   01/01/23 1533 -- (!) 108 22 99/80 --   01/01/23 1503 -- (!) 114 (!) 35 104/80 96 %   01/01/23 1420 -- (!) 110 29 -- --   01/01/23 1419 -- (!) 110 28 -- --   01/01/23 1418 -- (!) 111 20 -- --   01/01/23 1417 -- (!) 111 24 -- --   01/01/23 1411 -- (!) 116 28 -- --   01/01/23 1410 -- (!) 114 29 -- --   01/01/23 1409 -- (!) 114 (!) 31 -- --   01/01/23 1408 -- (!) 120 24 -- --   01/01/23 1200 98 °F (36.7 °C) -- -- -- --   01/01/23 1131 97.5 °F (36.4 °C) (!) 111 (!) 39 114/85 90 %   01/01/23 0939 -- (!) 109 23 -- 92 %   01/01/23 0806 -- 78 23 -- 97 %         Oxygen Therapy  SpO2: 97 %  Pulse Oximetry Type: Continuous  Pulse via Oximetry: 91 beats per minute  SPO2 High Alarm Limit: 100  SPO2 Low Alarm Limit POX: 90  Pulse Oximeter Device Mode: Continuous  Pulse Oximeter Device Location: Left  O2 Device: High flow nasal cannula  Oximetry Probe Site Changed: Yes  Skin Assessment: Clean, dry, & intact  FiO2 : 100 %  O2 Flow Rate (L/min): 4 L/min  Blood Gas  Performed?: Yes  Hakeem's Test #1: Collateral flow confirmed  Site #1: Right Radial  Site Prepped #1: Yes  Number of Attempts #1: 1  Pressure Held #1: Yes  Complications #1: None  Post-procedure #1: None  Specimen Status #1: Point of care  How Tolerated?: Tolerated well    Estimated body mass index is 34.31 kg/m² as calculated from the following:    Height as of this encounter: 6' (1.829 m). Weight as of this encounter: 253 lb (114.8 kg). Intake/Output Summary (Last 24 hours) at 1/2/2023 0708  Last data filed at 1/2/2023 0529  Gross per 24 hour   Intake 949.12 ml   Output 1050 ml   Net -100.88 ml         Blood pressure 103/77, pulse 92, temperature 97.7 °F (36.5 °C), temperature source Axillary, resp. rate 17, height 6' (1.829 m), weight 253 lb (114.8 kg), SpO2 97 %. Physical Exam  Vitals and nursing note reviewed. Constitutional:       General: He is in acute distress. Appearance: He is obese. He is ill-appearing. He is not diaphoretic. Interventions: Nasal cannula and face mask in place. Comments: Seen multiple times   HENT:      Head: Normocephalic and atraumatic. Eyes:      Extraocular Movements: Extraocular movements intact. Cardiovascular:      Rate and Rhythm: Normal rate. Heart sounds: Murmur heard. Comments: Edema to mid abdomen  Pulmonary:      Effort: Respiratory distress (severe) present. Breath sounds: No rales. Comments: clubbing  Abdominal:      General: There is no distension. Musculoskeletal:         General: No deformity. Right lower leg: Edema present. Left lower leg: Edema present. Skin:     Coloration: Skin is not jaundiced or pale. Neurological:      General: No focal deficit present. Mental Status: He is alert and oriented to person, place, and time. Psychiatric:         Mood and Affect: Mood normal.         Behavior: Behavior normal. Behavior is cooperative.       Comments: pleasant         I have personally reviewed labs and tests showing:  Recent Labs:  Recent Results (from the past 48 hour(s))   Transthoracic echocardiogram (TTE) limited with contrast, bubble, strain, and 3D PRN    Collection Time: 12/31/22  8:45 AM   Result Value Ref Range    LV EDV A2C 175 mL    LV EDV A4C 231 mL    LV ESV A2C 133 mL    LV ESV A4C 182 mL    IVSd 0.8 0.6 - 1.0 cm    LVIDd 6.7 (A) 4.2 - 5.9 cm    LVIDs 6.2 cm    LVPWd 0.9 0.6 - 1.0 cm    LV Ejection Fraction A2C 24 %    LV Ejection Fraction A4C 22 %    EF BP 21 (A) 55 - 100 %    AR .0 ms    AR Max Velocity PISA 3.8 m/s    AV Peak Velocity 2.1 m/s    AV Peak Gradient 17 mmHg    AV Mean Gradient 11 mmHg    AV VTI 40.8 cm    AV Mean Velocity 1.6 m/s    IVC Expiration 2.6 cm    LA Diameter 4.3 cm    Body Surface Area 2.4 m2    Fractional Shortening 2D 7 28 - 44 %    LV ESV Index A4C 78 mL/m2    LV EDV Index A4C 99 mL/m2    LV ESV Index A2C 57 mL/m2    LV EDV Index A2C 75 mL/m2    LVIDd Index 2.86 cm/m2    LVIDs Index 2.65 cm/m2    LV RWT Ratio 0.27     LV Mass 2D 243.5 (A) 88 - 224 g    LV Mass 2D Index 104.1 49 - 115 g/m2    LA Size Index 1.84 cm/m2   Basic Metabolic Panel    Collection Time: 01/01/23  3:15 AM   Result Value Ref Range    Sodium 134 133 - 143 mmol/L    Potassium 4.0 3.5 - 5.1 mmol/L    Chloride 101 101 - 110 mmol/L    CO2 25 21 - 32 mmol/L    Anion Gap 8 2 - 11 mmol/L    Glucose 113 (H) 65 - 100 mg/dL    BUN 38 (H) 8 - 23 MG/DL    Creatinine 2.27 (H) 0.8 - 1.5 MG/DL    Est, Glom Filt Rate 31 (L) >60 ml/min/1.73m2    Calcium 9.2 8.3 - 10.4 MG/DL   Phosphorus    Collection Time: 01/01/23  3:15 AM   Result Value Ref Range    Phosphorus 2.9 2.3 - 3.7 MG/DL   CBC    Collection Time: 01/01/23  3:15 AM   Result Value Ref Range    WBC 6.4 4.3 - 11.1 K/uL    RBC 4.44 4.23 - 5.6 M/uL    Hemoglobin 14.3 13.6 - 17.2 g/dL    Hematocrit 43.1 41.1 - 50.3 %    MCV 97.1 82.0 - 102.0 FL    MCH 32.2 26.1 - 32.9 PG    MCHC 33.2 31.4 - 35.0 g/dL    RDW 15.3 (H) 11.9 - 14.6 %    Platelets 81 (L) 485 - 450 K/uL    MPV 10.9 9.4 - 12.3 FL    nRBC 0.00 0.0 - 0.2 K/uL   Procalcitonin    Collection Time: 01/01/23  3:15 AM   Result Value Ref Range    Procalcitonin 0.48 0.00 - 0.49 ng/mL   Hepatic Function Panel    Collection Time: 01/01/23  3:15 AM   Result Value Ref Range    Total Protein 6.1 (L) 6.3 - 8.2 g/dL    Albumin 3.2 3.2 - 4.6 g/dL    Globulin 2.9 2.8 - 4.5 g/dL    Albumin/Globulin Ratio 1.1 0.4 - 1.6      Total Bilirubin 4.9 (H) 0.2 - 1.1 MG/DL    Bilirubin, Direct 3.0 (H) <0.4 MG/DL    Alk Phosphatase 166 (H) 50 - 136 U/L    AST 56 (H) 15 - 37 U/L    ALT 62 12 - 65 U/L   Magnesium    Collection Time: 01/01/23  3:15 AM   Result Value Ref Range    Magnesium 2.0 1.8 - 2.4 mg/dL   D-Dimer, Quantitative    Collection Time: 01/01/23  9:28 AM   Result Value Ref Range    D-Dimer, Quant 2.70 (H) <0.56 ug/ml(FEU)   APTT    Collection Time: 01/01/23  9:28 AM   Result Value Ref Range PTT 37.7 (H) 24.5 - 34.2 SEC   Protime-INR    Collection Time: 01/01/23  9:28 AM   Result Value Ref Range    Protime 18.0 (H) 12.6 - 14.3 sec    INR 1.5     Arterial Blood Gas, POC    Collection Time: 01/01/23  2:16 PM   Result Value Ref Range    DEVICE Non rebreather      FIO2 100 %    pH, Arterial, POC 7.36 7.35 - 7.45      pCO2, Arterial, POC 38.0 35 - 45 MMHG    pO2, Arterial,  (H) 75 - 100 MMHG    HCO3, Mixed 21.3 (L) 22 - 26 MMOL/L    SO2c, Arterial, POC 99.1 (H) 95 - 98 %    BASE DEFICIT (POC) 3.7 mmol/L    POC Hakeem's Test Positive      Site LEFT RADIAL      Specimen type: ARTERIAL      Performed by: Pennie    Anti-Xa, Unfractionated Heparin    Collection Time: 01/01/23  6:17 PM   Result Value Ref Range    Anti-XA Unfrac Heparin 0.76 (H) 0.3 - 0.7 IU/mL   Arterial Blood Gas, POC    Collection Time: 01/01/23  9:50 PM   Result Value Ref Range    DEVICE Non rebreather      FIO2 100 %    pH, Arterial, POC 7.40 7.35 - 7.45      pCO2, Arterial, POC 34.4 (L) 35 - 45 MMHG    pO2, Arterial,  (H) 75 - 100 MMHG    HCO3, Mixed 21.4 (L) 22 - 26 MMOL/L    SO2c, Arterial, POC 99.9 (H) 95 - 98 %    BASE DEFICIT (POC) 2.6 mmol/L    POC Hakeem's Test Positive      Site RIGHT RADIAL      Specimen type: ARTERIAL      Performed by: Ignacia    Anti-Xa, Unfractionated Heparin    Collection Time: 01/02/23 12:32 AM   Result Value Ref Range    Anti-XA Unfrac Heparin 0.61 0.3 - 0.7 IU/mL   CBC    Collection Time: 01/02/23  6:19 AM   Result Value Ref Range    WBC 6.5 4.3 - 11.1 K/uL    RBC 4.26 4.23 - 5.6 M/uL    Hemoglobin 14.1 13.6 - 17.2 g/dL    Hematocrit 43.0 41.1 - 50.3 %    .9 82.0 - 102.0 FL    MCH 33.1 (H) 26.1 - 32.9 PG    MCHC 32.8 31.4 - 35.0 g/dL    RDW 15.6 (H) 11.9 - 14.6 %    Platelets 64 (L) 357 - 450 K/uL    MPV 11.2 9.4 - 12.3 FL    nRBC 0.02 0.0 - 0.2 K/uL       I have personally reviewed imaging studies showing: Other Studies:  XR CHEST PORTABLE   Final Result      1.  Continued diffuse CHF/volume overload. CPT code(s) 63518                  US ABDOMEN LIMITED Specify organ? GALLBLADDER, LIVER   Final Result   1. No evidence of bile duct obstruction. 2.  Cholelithiasis and gallbladder wall thickening. Gallbladder wall thickening   could be due to inflammation or adjacent liver disease. 3.  Ascites. XR CHEST (2 VW)   Final Result   Cardiomegaly and pulmonary vascular congestion.                Vascular duplex lower extremity venous bilateral    (Results Pending)       Current Meds:  Current Facility-Administered Medications   Medication Dose Route Frequency    heparin (porcine) injection 9,340 Units  80 Units/kg IntraVENous PRN    heparin (porcine) injection 4,670 Units  40 Units/kg IntraVENous PRN    heparin 25,000 units in dextrose 5% 250 mL (premix) infusion  5-30 Units/kg/hr IntraVENous Continuous    medicated lip ointment (BLISTEX)   Topical PRN    nystatin (MYCOSTATIN) powder   Topical BID    Medela Tender Care Lanolin cream   Topical PRN    aspirin EC tablet 81 mg  81 mg Oral Daily    atorvastatin (LIPITOR) tablet 80 mg  80 mg Oral Nightly    [Held by provider] metoprolol succinate (TOPROL XL) extended release tablet 25 mg  25 mg Oral Daily    spironolactone (ALDACTONE) tablet 25 mg  25 mg Oral Daily    sodium chloride flush 0.9 % injection 5-40 mL  5-40 mL IntraVENous 2 times per day    sodium chloride flush 0.9 % injection 5-40 mL  5-40 mL IntraVENous PRN    0.9 % sodium chloride infusion   IntraVENous PRN    ondansetron (ZOFRAN-ODT) disintegrating tablet 4 mg  4 mg Oral Q8H PRN    Or    ondansetron (ZOFRAN) injection 4 mg  4 mg IntraVENous Q6H PRN    polyethylene glycol (GLYCOLAX) packet 17 g  17 g Oral Daily PRN    acetaminophen (TYLENOL) tablet 650 mg  650 mg Oral Q6H PRN    Or    acetaminophen (TYLENOL) suppository 650 mg  650 mg Rectal Q6H PRN    bumetanide (BUMEX) 12.5 mg in 50 mL infusion  0.5 mg/hr IntraVENous Continuous    potassium chloride (KLOR-CON M) extended release tablet 40 mEq  40 mEq Oral PRN    Or    potassium bicarb-citric acid (EFFER-K) effervescent tablet 40 mEq  40 mEq Oral PRN    Or    potassium chloride 10 mEq/100 mL IVPB (Peripheral Line)  10 mEq IntraVENous PRN    magnesium sulfate 2000 mg in 50 mL IVPB premix  2,000 mg IntraVENous PRN    sodium phosphate 10 mmol in sodium chloride 0.9 % 250 mL IVPB  10 mmol IntraVENous PRN    Or    sodium phosphate 15 mmol in sodium chloride 0.9 % 250 mL IVPB  15 mmol IntraVENous PRN    Or    sodium phosphate 20 mmol in sodium chloride 0.9 % 500 mL IVPB  20 mmol IntraVENous PRN    DOPamine (INTROPIN) 800 mg in dextrose 5 % 250 mL infusion  1-20 mcg/kg/min IntraVENous Continuous       Signed:  Adrienne Montanez MD  =

## 2023-01-02 NOTE — ACP (ADVANCE CARE PLANNING)
[employer] Hospitalist Service  At the heart of better care     Advance Care Planning   Admit Date:  2022  3:23 PM   Name:  [facility]   Age:  78 y.o. Sex:  male  :  1955   MRN:  [id number]   Room:  [facility]    Jose Ash is able to make his own decisions:   Yes    If pt unable to make decisions, POA/surrogate decision maker:  Sister    Other people present:   Niece, other family members    Patient / surrogate decision-maker directed code status:  DNR, hospice referral    Other ACP topics discussed, if applicable:   Transition off of ICU medications, plan for hospice    Patient or surrogate consented to discussion of the current conditions, workup, management plans, prognosis, and the risk for further deterioration. Time spent: 19 minutes in direct discussion.       Signed:  Eric Mireles MD

## 2023-01-02 NOTE — PROGRESS NOTES
[facility] CARDIOLOGY PROGRESS NOTE           1/1/2023 7:04 PM    Admit Date: 12/30/2022      Subjective:   Patient remains ICU. On dopamine at 5 and Bumex drip. Tachycardic with a sinus tachycardia at 115 resting. Cool extremities. Approximately 1 L out over last 24 hours. Still with significant tubular venous distention on exam.    ROS:  Cardiovascular:  As noted above    Objective:      Vitals:    01/01/23 1419 01/01/23 1420 01/01/23 1503 01/01/23 1641   BP:    107/84   Pulse: (!) 110 (!) 110 (!) 114 (!) 111   Resp: 28 29 (!) 35 25   Temp:    97.3 °F (36.3 °C)   TempSrc:    Axillary   SpO2:   96% 90%   Weight:       Height:           Physical Exam:  General-No Acute Distress  Neck- supple, +++ JVD  CV-tachycardic rate with regular rhythm. Grade 2/6 systolic ejection murmur. Lung- clear bilaterally  Abd- soft, nontender, nondistended  Ext- 2+  edema bilaterally. Skin-cool extremities. Data Review:   Recent Labs     01/01/23  0315 12/31/22  0420 12/30/22  1418   WBC 6.4 6.6 6.6   HGB 14.3 14.5 16.5   HCT 43.1 43.5 50.4*   MCV 97.1 96.7 99.2   PLT 81* 93* 102*       Recent Labs     01/01/23  0315      K 4.0      CO2 25   BUN 38*   CREATININE 2.27*   GLUCOSE 113*   CALCIUM 9.2   PROT 6.1*   LABALBU 3.2   BILITOT 4.9*   ALKPHOS 166*   AST 56*   ALT 62   LABGLOM 31*   GLOB 2.9       Recent Labs     01/01/23  0928   DDIMER 2.70*       Echo (12/30/22): Left Ventricle: Severely reduced left ventricular systolic function. EF by 2D Simpsons Biplane is 21%. Left ventricle is severely dilated. Normal wall thickness. Severe global hypokinesis present. Right Ventricle: Right ventricle is moderately dilated. Severely reduced systolic function. Aortic Valve: Valve structure is normal. Moderately calcified cusp. Moderate to severe regurgitation. Mild stenosis of the aortic valve. AV mean gradient is 11 mmHg. AV peak gradient is 17 mmHg.     Mitral Valve: Mild to moderate regurgitation. Tricuspid Valve: Moderate regurgitation. Left Atrium: Left atrium is moderately dilated. Right Atrium: Right atrium is moderately dilated. Assessment/Plan:     Active Hospital Problems    Nonrheumatic aortic valve insufficiency  Moderate to severe aortic regurgitation. Not a candidate for valvular replacement with his current health and comorbidities. Chronic kidney disease, stage 3a ([facility] [address]  See below. Poor prognosis. Chronic combined systolic and diastolic congestive heart failure ([facility] [address]  Patient with severe LV dysfunction. Appears to have end-stage heart failure. He is currently on dopamine and Bumex drip. Discussed with patient and available family his poor prognosis. He has clinical evidence of endorgan malperfusion despite dopamine and resting tachycardia of 115. He has significant hepatic and renal dysfunction as well as cool extremities. We will continue Bumex drip. We discussed if his renal function starts to decline this would indicate a very poor short-term prognosis and he may be best served with a hospice approach. Reasonable to engage palliative care to discuss goals of care.   He is DNR        [facility]interstitial lung disease) [facility]  Noted      Class 1 obesity due to excess calories with serious comorbidity and body mass index (BMI) of 34.0 to 34.9 in adult  Noted                 Leslee Poe MD

## 2023-01-03 ENCOUNTER — HOME CARE VISIT (OUTPATIENT)
Dept: HOSPICE | Facility: HOSPICE | Age: 68
End: 2023-01-03
Payer: COMMERCIAL

## 2023-01-03 ENCOUNTER — TELEPHONE (OUTPATIENT)
Dept: INTERNAL MEDICINE CLINIC | Facility: CLINIC | Age: 68
End: 2023-01-03

## 2023-01-03 VITALS
WEIGHT: 260.7 LBS | SYSTOLIC BLOOD PRESSURE: 91 MMHG | HEART RATE: 86 BPM | RESPIRATION RATE: 19 BRPM | BODY MASS INDEX: 35.31 KG/M2 | OXYGEN SATURATION: 100 % | HEIGHT: 72 IN | TEMPERATURE: 97.1 F | DIASTOLIC BLOOD PRESSURE: 66 MMHG

## 2023-01-03 VITALS
WEIGHT: 250 LBS | HEIGHT: 72 IN | HEART RATE: 88 BPM | BODY MASS INDEX: 33.86 KG/M2 | TEMPERATURE: 98.3 F | SYSTOLIC BLOOD PRESSURE: 97 MMHG | DIASTOLIC BLOOD PRESSURE: 67 MMHG | OXYGEN SATURATION: 96 % | RESPIRATION RATE: 18 BRPM

## 2023-01-03 PROBLEM — Z51.5 HOSPICE CARE: Status: ACTIVE | Noted: 2023-01-01

## 2023-01-03 LAB
ALBUMIN SERPL-MCNC: 2.9 G/DL (ref 3.2–4.6)
ALBUMIN/GLOB SERPL: 1.2 {RATIO} (ref 0.4–1.6)
ALP SERPL-CCNC: 142 U/L (ref 50–136)
ALT SERPL-CCNC: 58 U/L (ref 12–65)
ANION GAP SERPL CALC-SCNC: 7 MMOL/L (ref 2–11)
AST SERPL-CCNC: 65 U/L (ref 15–37)
BILIRUB SERPL-MCNC: 5.4 MG/DL (ref 0.2–1.1)
BUN SERPL-MCNC: 45 MG/DL (ref 8–23)
CALCIUM SERPL-MCNC: 8.4 MG/DL (ref 8.3–10.4)
CHLORIDE SERPL-SCNC: 99 MMOL/L (ref 101–110)
CO2 SERPL-SCNC: 27 MMOL/L (ref 21–32)
CREAT SERPL-MCNC: 2.42 MG/DL (ref 0.8–1.5)
ERYTHROCYTE [DISTWIDTH] IN BLOOD BY AUTOMATED COUNT: 15.5 % (ref 11.9–14.6)
GLOBULIN SER CALC-MCNC: 2.5 G/DL (ref 2.8–4.5)
GLUCOSE SERPL-MCNC: 97 MG/DL (ref 65–100)
HCT VFR BLD AUTO: 39.8 % (ref 41.1–50.3)
HGB BLD-MCNC: 13 G/DL (ref 13.6–17.2)
MCH RBC QN AUTO: 32.2 PG (ref 26.1–32.9)
MCHC RBC AUTO-ENTMCNC: 32.7 G/DL (ref 31.4–35)
MCV RBC AUTO: 98.5 FL (ref 82–102)
NRBC # BLD: 0 K/UL (ref 0–0.2)
PHOSPHATE SERPL-MCNC: 3.5 MG/DL (ref 2.3–3.7)
PLATELET # BLD AUTO: 76 K/UL (ref 150–450)
PMV BLD AUTO: 10.4 FL (ref 9.4–12.3)
POTASSIUM SERPL-SCNC: 3.6 MMOL/L (ref 3.5–5.1)
PROT SERPL-MCNC: 5.4 G/DL (ref 6.3–8.2)
RBC # BLD AUTO: 4.04 M/UL (ref 4.23–5.6)
SODIUM SERPL-SCNC: 133 MMOL/L (ref 133–143)
WBC # BLD AUTO: 6.6 K/UL (ref 4.3–11.1)

## 2023-01-03 PROCEDURE — 6360000002 HC RX W HCPCS: Performed by: FAMILY MEDICINE

## 2023-01-03 PROCEDURE — 6370000000 HC RX 637 (ALT 250 FOR IP): Performed by: FAMILY MEDICINE

## 2023-01-03 PROCEDURE — 94761 N-INVAS EAR/PLS OXIMETRY MLT: CPT

## 2023-01-03 PROCEDURE — G0299 HHS/HOSPICE OF RN EA 15 MIN: HCPCS

## 2023-01-03 PROCEDURE — 2700000000 HC OXYGEN THERAPY PER DAY

## 2023-01-03 PROCEDURE — 0651 HSPC ROUTINE HOME CARE

## 2023-01-03 PROCEDURE — P9047 ALBUMIN (HUMAN), 25%, 50ML: HCPCS | Performed by: FAMILY MEDICINE

## 2023-01-03 PROCEDURE — 36415 COLL VENOUS BLD VENIPUNCTURE: CPT

## 2023-01-03 PROCEDURE — 80053 COMPREHEN METABOLIC PANEL: CPT

## 2023-01-03 PROCEDURE — 84100 ASSAY OF PHOSPHORUS: CPT

## 2023-01-03 PROCEDURE — 85027 COMPLETE CBC AUTOMATED: CPT

## 2023-01-03 PROCEDURE — 2580000003 HC RX 258: Performed by: FAMILY MEDICINE

## 2023-01-03 RX ORDER — DIGOXIN 125 MCG
125 TABLET ORAL DAILY
Qty: 30 TABLET | Refills: 0 | Status: SHIPPED | OUTPATIENT
Start: 2023-01-04 | End: 2023-01-04

## 2023-01-03 RX ORDER — VALSARTAN 160 MG/1
TABLET ORAL
Qty: 90 TABLET | Refills: 3 | OUTPATIENT
Start: 2023-01-03

## 2023-01-03 RX ORDER — MORPHINE SULFATE 100 MG/5ML
5 SOLUTION ORAL
Qty: 15 ML | Refills: 0 | Status: SHIPPED | OUTPATIENT
Start: 2023-01-03 | End: 2023-01-04

## 2023-01-03 RX ORDER — ALBUMIN (HUMAN) 12.5 G/50ML
25 SOLUTION INTRAVENOUS EVERY 6 HOURS
Status: DISCONTINUED | OUTPATIENT
Start: 2023-01-03 | End: 2023-01-03 | Stop reason: HOSPADM

## 2023-01-03 RX ADMIN — DIGOXIN 125 MCG: 125 TABLET ORAL at 09:10

## 2023-01-03 RX ADMIN — ASPIRIN 81 MG: 81 TABLET, COATED ORAL at 09:10

## 2023-01-03 RX ADMIN — SODIUM CHLORIDE, PRESERVATIVE FREE 5 ML: 5 INJECTION INTRAVENOUS at 09:10

## 2023-01-03 RX ADMIN — SPIRONOLACTONE 25 MG: 25 TABLET ORAL at 09:10

## 2023-01-03 RX ADMIN — BUMETANIDE 2 MG: 1 TABLET ORAL at 09:09

## 2023-01-03 RX ADMIN — ALBUMIN (HUMAN) 25 G: 0.25 INJECTION, SOLUTION INTRAVENOUS at 09:09

## 2023-01-03 RX ADMIN — APIXABAN 10 MG: 5 TABLET, FILM COATED ORAL at 09:09

## 2023-01-03 RX ADMIN — NYSTATIN: 100000 POWDER TOPICAL at 09:10

## 2023-01-03 ASSESSMENT — PAIN SCALES - GENERAL: PAINLEVEL_OUTOF10: 0

## 2023-01-03 ASSESSMENT — ENCOUNTER SYMPTOMS
STOOL DESCRIPTION: LOOSE
PAIN LOCATION - PAIN QUALITY: PRESSURE
DYSPNEA ACTIVITY LEVEL: AT REST

## 2023-01-03 NOTE — DISCHARGE SUMMARY
Hospitalist Discharge Summary   Admit Date:  2022  3:23 PM   DC Note date: 1/3/2023  [facility]   Age:  78 y.o. Sex:  male  :  1955   MRN:  [id number]   Room:  [facility]  PCP:  Colin Rushing MD    Presenting Complaint: Abnormal Lab     Initial Admission Diagnosis: Elevated troponin [R77.8]  Acute on chronic systolic congestive heart failure (HCC) [I50.23]  Acute renal failure, unspecified acute renal failure type ([address] [N17.9]  Stage 2 acute kidney injury ([address] [N17.9]  Congestive heart failure, unspecified HF chronicity, unspecified heart failure type ([address] [I50.9]     Problem List for this Hospitalization (present on admission):    Principal Problem:    Stage 2 acute kidney injury ([address]  Active Problems:    Nonrheumatic aortic valve insufficiency    Congestive heart failure (HCC)    Elevated troponin    Hospice care    Demand ischemia of myocardium ([address]    Chronic kidney disease, stage 3a ([address]    Chronic combined systolic and diastolic congestive heart failure (HCC)    ILD (interstitial lung disease) ([address]    Hyperlipidemia    Class 1 obesity due to excess calories with serious comorbidity and body mass index (BMI) of 34.0 to 34.9 in adult  Resolved Problems:    * No resolved hospital problems. [facility] Course:  37Y PMHx CKD3a, HFrEF, interstitial lung disease who presented with worsening shortness of breath and swelling. He has had chronic swelling and shortness of breath intermittently for several months. His EF was 20-25% at last check in November. He was at a doctor's appointment  and labs were drawn. When they resulted his provider directed him to the emergency department for evaluation. He was found to have an acute kidney injury, volume overload, and elevation of transaminases. He was admitted to the ICU for further evaluation and management. Aggressive medical care temporarily improved his renal function.  Cardiology was consulted and indicated that this was end-stage disease. In discussion with his loving family he opted for discharge home with hospice. At the time of discharge his volume was again increasing after weaning to oral medications. He was on 3L NC. He was determined to be appropriate for discharge 1/3. Disposition: home with hospice  Diet: ADULT DIET; Regular; No Added Salt (3-4 gm); 2000 ml  Code Status: DNR    Follow Ups:   Follow-up Information     [facility]. Go today. Specialty: Hospice  Why: Routine progression of care  Contact information:  Kiara Gentile [id number]                     Time spent in patient discharge and coordination 42 minutes. Follow up labs/diagnostics (ultimately defer to outpatient provider):  Hospice care    Plan was discussed with patient, family, , nursing, hospice. All questions answered. Patient was stable at time of discharge. Instructions given to call a physician or return if any concerns. Current Discharge Medication List        START taking these medications    Details   digoxin (LANOXIN) 125 MCG tablet Take 1 tablet by mouth daily  Qty: 30 tablet, Refills: 0      morphine sulfate 20 MG/ML concentrated oral solution Take 0.25 mLs by mouth every 2 hours as needed for Pain for up to 5 days.  Max Daily Amount: 60 mg  Qty: 15 mL, Refills: 0    Comments: Reduce doses taken as pain becomes manageable  Associated Diagnoses: Hospice care           CONTINUE these medications which have NOT CHANGED    Details   cetirizine (ZYRTEC) 10 MG tablet Take 10 mg by mouth daily      aspirin 81 MG EC tablet Take 1 tablet by mouth daily  Qty: 30 tablet, Refills: 3      atorvastatin (LIPITOR) 80 MG tablet Take 1 tablet by mouth nightly  Qty: 30 tablet, Refills: 3      clopidogrel (PLAVIX) 75 MG tablet Take 1 tablet by mouth daily for 18 doses  Qty: 30 tablet, Refills: 3      Budeson-Glycopyrrol-Formoterol (BREZTRI AEROSPHERE) 160-9-4.8 MCG/ACT AERO Inhale 2 puffs into the lungs 2 times daily  Qty: 1 each, Refills: 11      metoprolol succinate (TOPROL XL) 25 MG extended release tablet Take 1 tablet by mouth daily  Qty: 30 tablet, Refills: 3      bumetanide (BUMEX) 1 MG tablet Take 1 mg by mouth 2 times daily      Nintedanib Esylate (OFEV) 150 MG CAPS TAKE 1 CAPSULE BY MOUTH TWICE A DAY 12 HOURS APART WITH FOOD      spironolactone (ALDACTONE) 25 MG tablet Take 25 mg by mouth daily             Procedures done this admission:  * No surgery found *    Consults this admission:  IP CONSULT TO HEART FAILURE NURSE/COORDINATOR  IP CONSULT TO DIETITIAN  IP CONSULT TO SPIRITUAL SERVICES  IP CONSULT TO CARDIOLOGY  IP CONSULT TO CASE MANAGEMENT  IP CONSULT TO HOSPICE    Echocardiogram results:  12/30/22    TRANSTHORACIC ECHOCARDIOGRAM (TTE) LIMITED (CONTRAST/BUBBLE/3D PRN) 12/31/2022  1:14 PM (Final)    Interpretation Summary    Left Ventricle: Severely reduced left ventricular systolic function. EF by 2D Simpsons Biplane is 21%. Left ventricle is severely dilated. Normal wall thickness. Severe global hypokinesis present. Right Ventricle: Right ventricle is moderately dilated. Severely reduced systolic function. Aortic Valve: Valve structure is normal. Moderately calcified cusp. Moderate to severe regurgitation. Mild stenosis of the aortic valve. AV mean gradient is 11 mmHg. AV peak gradient is 17 mmHg. Mitral Valve: Mild to moderate regurgitation. Tricuspid Valve: Moderate regurgitation. Left Atrium: Left atrium is moderately dilated. Right Atrium: Right atrium is moderately dilated. Technical qualifiers: Color flow Doppler was performed and pulse wave and/or continuous wave Doppler was performed. Contrast used: Definity. Signed by: Laly Sarmiento MD on 12/31/2022  1:14 PM      Diagnostic Imaging/Tests:   XR CHEST (2 VW)    Result Date: 12/30/2022  Cardiomegaly and pulmonary vascular congestion.      US THYROID    Result Date: 12/12/2022  Partially calcified nodule right lobe thyroid. This has a similar appearance to that seen on a CT chest dated 3/26/2021. No follow-up recommended. _______________________________________________________________________________ Based on the number of points for a thyroid nodule, the following are the accepted ACR TI-RADS recommendations: TR1, Benign = 0 points TR2, Not suspicious = 2 points TR3, Mildly suspicious = 3 points (FNA if > 2.5 cm, follow if >1.5 cm) TR4, Moderately suspicious = 4-6 points (FNA if > 1.5 cm, follow if > 1 cm) TR5, Highly suspicious = 7+ points (FNA if > 1 cm, follow if > 0.5 cm)    XR CHEST PORTABLE    Result Date: 1/2/2023  Appearance again most likely that of cardiomegaly with significant pulmonary edema-CHF. Continues to likely be some right base pleural and fissural fluid which is similar to before. Cannot exclude a diffuse pneumonia with this appearance. Overall appearance mildly improved. XR CHEST PORTABLE    Result Date: 1/1/2023  1. Continued diffuse CHF/volume overload. CPT code(s) 97072     US ABDOMEN LIMITED Specify organ? GALLBLADDER, LIVER    Result Date: 12/30/2022  1. No evidence of bile duct obstruction. 2.  Cholelithiasis and gallbladder wall thickening. Gallbladder wall thickening could be due to inflammation or adjacent liver disease. 3.  Ascites. Vascular duplex lower extremity venous bilateral    Result Date: 1/2/2023  1. No evidence of deep venous thrombosis in either lower extremity. 2.  Bilateral subcutaneous edema.        Labs: Results:       BMP, Mg, Phos Recent Labs     01/01/23  0315 01/02/23  0619 01/03/23  0328    133 133   K 4.0 3.8 3.6    100* 99*   CO2 25 23 27   ANIONGAP 8 10 7   BUN 38* 43* 45*   CREATININE 2.27* 2.39* 2.42*   LABGLOM 31* 29* 29*   CALCIUM 9.2 9.2 8.4   GLUCOSE 113* 129* 97   MG 2.0  --   --    PHOS 2.9 3.5 3.5      CBC Recent Labs     01/01/23  0315 01/02/23  0619 01/03/23  0328   WBC 6.4 6.5 6.6   RBC 4.44 4.26 4.04*   HGB 14.3 14.1 13.0*   HCT 43.1 43.0 39.8*   MCV 97.1 100.9 98.5   MCH 32.2 33.1* 32.2   MCHC 33.2 32.8 32.7   RDW 15.3* 15.6* 15.5*   PLT 81* 64* 76*   MPV 10.9 11.2 10.4   NRBC 0.00 0.02 0.00      LFT Recent Labs     01/01/23  0315 01/02/23  0619 01/03/23  0328   BILITOT 4.9* 5.0* 5.4*   BILIDIR 3.0*  --   --    ALKPHOS 166* 153* 142*   AST 56* 79* 65*   ALT 62 64 58   PROT 6.1* 6.1* 5.4*   LABALBU 3.2 3.3 2.9*   GLOB 2.9 2.8 2.5*      Cardiac  Lab Results   Component Value Date/Time    NTPROBNP 66,293 01/02/2023 06:19 AM    NTPROBNP 26,455 12/30/2022 02:18 PM    NTPROBNP 26,696 12/29/2022 10:32 AM    TROPHS 198.9 12/30/2022 05:23 PM    TROPHS 178.2 12/30/2022 02:18 PM      Coags Lab Results   Component Value Date/Time    PROTIME 18.0 01/01/2023 09:28 AM    PROTIME 17.2 11/19/2022 09:34 AM    INR 1.5 01/01/2023 09:28 AM    INR 1.4 11/19/2022 09:34 AM    APTT 37.7 01/01/2023 09:28 AM      A1c Lab Results   Component Value Date/Time    LABA1C 6.3 12/31/2022 04:20 AM    LABA1C 5.9 11/20/2022 04:41 AM    LABA1C 5.6 09/30/2019 08:29 AM     12/31/2022 04:20 AM     11/20/2022 04:41 AM     09/30/2019 08:29 AM      Lipids Lab Results   Component Value Date/Time    CHOL 66 12/31/2022 04:20 AM    LDLCALC 31.4 12/31/2022 04:20 AM    LABVLDL 15.6 12/31/2022 04:20 AM    HDL 19 12/31/2022 04:20 AM    CHOLHDLRATIO 3.5 12/31/2022 04:20 AM    TRIG 78 12/31/2022 04:20 AM      Thyroid  Lab Results   Component Value Date/Time    TSHELE 2.24 12/31/2022 04:20 AM    AUP7UOE 1.020 11/02/2022 08:52 AM        Most Recent UA Lab Results   Component Value Date/Time    COLORU ISIAH 09/07/2021 11:24 AM    SPECGRAV 1.023 09/07/2021 11:24 AM    PROTEINU TRACE 09/07/2021 11:24 AM    GLUCOSEU Negative 09/07/2021 11:24 AM    GLUCOSEU Negative 04/21/2021 08:58 AM    KETUA TRACE 09/07/2021 11:24 AM    BILIRUBINUR SMALL 09/07/2021 11:24 AM    BLOODU Negative 09/07/2021 11:24 AM    UROBILINOGEN 1.0 04/21/2021 08:58 AM    NITRU Negative 09/07/2021 11:24 AM    LEUKOCYTESUR Negative 09/07/2021 11:24 AM    WBCUA 0-3 09/07/2021 11:24 AM    RBCUA 3-5 09/07/2021 11:24 AM    BACTERIA 0 09/07/2021 11:24 AM        No results for input(s): CULTURE in the last 720 hours.     All Labs from Last 24 Hrs:  Recent Results (from the past 24 hour(s))   Anti-Xa, Unfractionated Heparin    Collection Time: 01/02/23 12:32 PM   Result Value Ref Range    Anti-XA Unfrac Heparin >1.1 (H) 0.3 - 0.7 IU/mL   Phosphorus    Collection Time: 01/03/23  3:28 AM   Result Value Ref Range    Phosphorus 3.5 2.3 - 3.7 MG/DL   CBC    Collection Time: 01/03/23  3:28 AM   Result Value Ref Range    WBC 6.6 4.3 - 11.1 K/uL    RBC 4.04 (L) 4.23 - 5.6 M/uL    Hemoglobin 13.0 (L) 13.6 - 17.2 g/dL    Hematocrit 39.8 (L) 41.1 - 50.3 %    MCV 98.5 82.0 - 102.0 FL    MCH 32.2 26.1 - 32.9 PG    MCHC 32.7 31.4 - 35.0 g/dL    RDW 15.5 (H) 11.9 - 14.6 %    Platelets 76 (L) 885 - 450 K/uL    MPV 10.4 9.4 - 12.3 FL    nRBC 0.00 0.0 - 0.2 K/uL   Comprehensive Metabolic Panel w/ Reflex to MG    Collection Time: 01/03/23  3:28 AM   Result Value Ref Range    Sodium 133 133 - 143 mmol/L    Potassium 3.6 3.5 - 5.1 mmol/L    Chloride 99 (L) 101 - 110 mmol/L    CO2 27 21 - 32 mmol/L    Anion Gap 7 2 - 11 mmol/L    Glucose 97 65 - 100 mg/dL    BUN 45 (H) 8 - 23 MG/DL    Creatinine 2.42 (H) 0.8 - 1.5 MG/DL    Est, Glom Filt Rate 29 (L) >60 ml/min/1.73m2    Calcium 8.4 8.3 - 10.4 MG/DL    Total Bilirubin 5.4 (H) 0.2 - 1.1 MG/DL    ALT 58 12 - 65 U/L    AST 65 (H) 15 - 37 U/L    Alk Phosphatase 142 (H) 50 - 136 U/L    Total Protein 5.4 (L) 6.3 - 8.2 g/dL    Albumin 2.9 (L) 3.2 - 4.6 g/dL    Globulin 2.5 (L) 2.8 - 4.5 g/dL    Albumin/Globulin Ratio 1.2 0.4 - 1.6         Allergies   Allergen Reactions    Penicillin G Shortness Of Breath     Immunization History   Administered Date(s) Administered    COVID-19, MODERNA BLUE border, Primary or Immunocompromised, (age 12y+), IM, 100 mcg/0.5mL 07/01/2021, 08/01/2021    COVID-19, MODERNA Booster BLUE border, (age 18y+), IM, 50mcg/0.25mL 01/01/2022    DTaP (Infanrix) 11/09/2009    Influenza Quadv 12/11/2014    Influenza Trivalent 10/10/2011, 11/08/2012    Influenza, FLUARIX, FLULAVAL, Florance Solo (age 10 mo+) AND AFLURIA, (age 1 y+), PF, 0.5mL 11/22/2013, 12/28/2015, 09/28/2018, 10/11/2019, 10/14/2020    Influenza, High Dose (Fluzone 65 yrs and older) 10/01/2021    PPD Test 09/07/2016    Tdap (Boostrix, Adacel) 01/30/2020       Recent Vital Data:  Patient Vitals for the past 24 hrs:   Temp Pulse Resp BP SpO2   01/03/23 0909 -- -- -- 95/64 --   01/03/23 0800 97.2 °F (36.2 °C) 84 20 90/60 100 %   01/03/23 0600 -- 82 15 90/60 100 %   01/03/23 0500 -- 83 15 -- 96 %   01/03/23 0400 -- 81 13 (!) 90/59 99 %   01/03/23 0300 -- 80 26 -- 98 %   01/03/23 0200 -- 79 21 85/60 99 %   01/03/23 0100 -- 78 12 -- 98 %   01/03/23 0000 -- 81 18 90/63 97 %   01/02/23 2300 -- 81 17 -- 97 %   01/02/23 2136 -- -- -- (!) 92/58 --   01/02/23 2100 -- 80 28 -- 99 %   01/02/23 2000 97.2 °F (36.2 °C) 76 22 (!) 92/58 97 %   01/02/23 1951 97.2 °F (36.2 °C) -- -- -- --   01/02/23 1628 -- 84 28 -- 94 %   01/02/23 1430 97.4 °F (36.3 °C) 77 18 91/66 100 %   01/02/23 1415 -- 77 22 91/68 100 %   01/02/23 1400 -- 77 21 91/65 100 %   01/02/23 1345 -- 75 21 89/63 100 %   01/02/23 1330 -- 78 15 88/68 97 %   01/02/23 1315 -- 78 -- 90/66 96 %   01/02/23 1300 -- 79 17 89/63 95 %   01/02/23 1245 -- 79 21 90/78 97 %   01/02/23 1230 -- 85 (!) 33 90/66 96 %   01/02/23 1215 -- 79 17 91/66 97 %   01/02/23 1200 -- 79 27 91/65 97 %   01/02/23 1145 -- 79 -- 90/64 96 %   01/02/23 1130 -- 78 23 89/65 99 %   01/02/23 1115 97.3 °F (36.3 °C) 82 27 92/68 99 %   01/02/23 1100 -- 84 27 97/67 99 %   01/02/23 1045 -- 89 15 97/75 96 %   01/02/23 1030 -- 83 13 92/65 97 %   01/02/23 1015 -- 83 14 95/69 100 %   01/02/23 1000 -- 82 -- 91/67 98 %   01/02/23 0945 -- 91 -- 99/68 100 %       Oxygen Therapy  SpO2: 100 %  Pulse Oximetry Type: Continuous  Pulse via Oximetry: 82 beats per minute  SPO2 High Alarm Limit: 100  SPO2 Low Alarm Limit POX: 90  Pulse Oximeter Device Mode: Continuous  Pulse Oximeter Device Location: Forehead  O2 Device: High flow nasal cannula  Oximetry Probe Site Changed: Yes  Skin Assessment: Clean, dry, & intact  FiO2 : 100 %  O2 Flow Rate (L/min): 3 L/min  Blood Gas  Performed?: Yes  Hakeem's Test #1: Collateral flow confirmed  Site #1: Right Radial  Site Prepped #1: Yes  Number of Attempts #1: 1  Pressure Held #1: Yes  Complications #1: None  Post-procedure #1: None  Specimen Status #1: Point of care  How Tolerated?: Tolerated well    Estimated body mass index is 35.36 kg/m² as calculated from the following:    Height as of this encounter: 6' (1.829 m). Weight as of this encounter: 260 lb 11.2 oz (118.3 kg). Intake/Output Summary (Last 24 hours) at 1/3/2023 0908  Last data filed at 1/3/2023 0548  Gross per 24 hour   Intake 5 ml   Output 2150 ml   Net -2145 ml         Physical Exam  Vitals and nursing note reviewed. Constitutional:       General: He is not in acute distress. Appearance: He is obese. He is ill-appearing. He is not diaphoretic. Interventions: Nasal cannula and face mask in place. Comments: Seen multiple times   HENT:      Head: Normocephalic and atraumatic. Eyes:      Extraocular Movements: Extraocular movements intact. Cardiovascular:      Rate and Rhythm: Normal rate. Heart sounds: Murmur heard. Comments: Edema to mid abdomen  Pulmonary:      Effort: No respiratory distress. Breath sounds: No rales. Comments: clubbing  Abdominal:      General: There is no distension. Musculoskeletal:         General: No deformity. Right lower leg: Edema present. Left lower leg: Edema present. Skin:     Coloration: Skin is not jaundiced or pale. Neurological:      General: No focal deficit present. Mental Status: He is alert and oriented to person, place, and time.    Psychiatric: Mood and Affect: Mood normal.         Behavior: Behavior normal. Behavior is cooperative.       Comments: Affable         Signed:  Lui Frye MD

## 2023-01-03 NOTE — CARE COORDINATION
01/03/23 1138   Service Assessment   Patient Orientation Alert and Oriented   Cognition Alert   History Provided By Patient   Primary [place] Family Members   PCP Verified by CM Yes   Prior Functional Level Assistance with the following:   Current Functional Level Assistance with the following:   Can patient return to prior living arrangement Yes   Ability to make needs known: Good   Family able to assist with home care needs: Yes   Financial Resources Medicare   Social/Functional History   Lives With Family   Type of Home Apartment   Home Equipment Carmina Janak, rolling;Cane   ADL Assistance Needs assistance   Mode of Transportation Car   Discharge Planning   Living Arrangements Family Members   Current Services Prior To Admission Durable Medical Equipment   Potential Assistance Needed Other (Comment)  (home hospice)   Potential Assistance Purchasing Medications No   Patient expects to be discharged to: Hospice (comment)  (home hospice)   Services At/After Discharge   Services At/After Discharge Hospice   Mode of Transport at Discharge BLS   Condition of Participation: Discharge Planning   The Patient and/or Patient Representative was provided with a Choice of Provider? Patient   The Patient and/Or Patient Representative agree with the Discharge Plan? Yes   Freedom of Choice list was provided with basic dialogue that supports the patient's individualized plan of care/goals, treatment preferences, and shares the quality data associated with the providers? Yes     The patient is discharging home with [facility]. The hospice agency arranged for medical transport. The patient declining signing the DNR form for medical transport. The patient and his sister verbalized understanding and agreement with the discharge plan.

## 2023-01-03 NOTE — CARE COORDINATION
1100: RN CM met with the patient and sister at the bedside and discussed discharge planning. They confirmed they are in agreement for the patient to discharge home with [facility]. They were provided with an [employer]. RN CM discussed hiring private caregivers if they're needed. The patient's sister Ray Key confirmed she will be the patient's primary caregiver for the next month. She confirmed the patient's home equipment has been delivered. Per the patient and family, transport was scheduled for 1130 today. RN CM spoke with the hospice liaison and inquired about the anticipated discharge and was told the liaison will call case management back at a later time. Discharge planning was discussed with the primary nurse. ELISA TAPIA called [facility] ambulance services and confirmed the hospice agency arranged transport for [phone] today. 1125: RN CM spoke with the [facility] hospice liaison and confirmed they have accepted the patient for services. RN CM met with the patient and Madelin at the bedside and delivered and explained the [facility] DNR form for medical transport. RN CM informed them that signing it is voluntary. He declined signing the form.

## 2023-01-03 NOTE — CARE COORDINATION
Discharge planning was discussed with the Critical Care Interdisciplinary Team. Per the attending MD, the patient is discharging today with hospice services.

## 2023-01-03 NOTE — PROGRESS NOTES
Patient discharged with [facility] at this time accompanied with sister. All paperwork in hand.

## 2023-01-03 NOTE — HOSPICE
Argenis Barlow, 78year old male admitted to [address] on 1/3/23 with the primary diagnosis of Acute on chronic right and left ventricular systolic dysfunction associated CHF. Additional diagnosis: Cardiorenal Renal Syndrome, Demand myocardial ischemic injury, Pulmonary Hypertension, ILD, Ischemic Cardiomyopathy, Aortic valve insufficiency, Mitral valve insufficiency, tricuspid valve insufficiency, Passive hepatic congestion, Bronchiectasis, Usual Interstitial Pneumonitis, Respiratory failure with hypoxia, Hypotension, Pulmonary Edema, Jaundice, Obesity, Osteoarthritis, TIA, Thrombocytopenia. Patient presented with worsening shortness of breath and swelling. He has had chronic swelling and shortness of breath intermittently for several months. His EF was 20-25% at last check in November. He was at a doctor's appointment 12/29 and labs were drawn. When they resulted his provider directed him to the emergency department for evaluation. He was found to have an acute kidney injury, volume overload, and elevation of transaminases. He was admitted to the ICU for further evaluation and management. Aggressive medical care temporarily improved his renal function. Cardiology was consulted and indicated that this was end-stage disease. Patient arrives home today on 2 liters of oxygen. He is unable to get out of the bed. He has an intact mercado catheter to bag drainage. Color is lydia and there is sediment noted. Skin is intact. Edema noted in bilateral lower extremities. Patient states he is not having pain but does have pressure sometimes in his chest. New orders for Roxanol and Ativan ordered today. Patient is also starting on Digoxin. Patient will be living with his sister Nishi Dickey in her home. She is PCG and HCPOA. Consents and DNR signed today with Madelin due to patient's shortness of breath. DME delivered today: bed, over the bed table, oxygen, bedside commode and wheelchair.  Medications to be delivered by Julio Cesar Islas: digoxin, roxanol, ativan and senna. Supplies to be ordered: XL briefs, chux, wipes, basin, soap, barrier cream and allevyn. RN educated patient and family on hospice process and philosophy, medication management, pain control, skin care and protection, fall precautions, home safety, oxygen safety, and social distancing due to COVID-19. Pt history, assessment, meds and status reviewed with patient's hospice attending MD, Dr. Nikolas Issa. Leonardo Gunderson and Devang Jackson PCG made aware of volunteer services but pending at this time due to COVID-19; plans to reassess volunteer needs once volunteer services become available. Patient is appropriate for hospice services at this time. [facility] 2xw. [facility] and [facility] services accepted. Caregiver made aware that an RN will be completing a follow up visit within 24 hours. Handwritten medication list, Charli Adkins book and magnet with Charli Adkins phone number left in home. RN educated caregiver to call [email or web address] 24/7 phone line with any changes, concerns or falls with verbalized understanding.

## 2023-01-04 ENCOUNTER — HOME CARE VISIT (OUTPATIENT)
Dept: HOSPICE | Facility: HOSPICE | Age: 68
End: 2023-01-04
Payer: COMMERCIAL

## 2023-01-04 ENCOUNTER — HOME CARE VISIT (OUTPATIENT)
Dept: SCHEDULING | Facility: HOME HEALTH | Age: 68
End: 2023-01-04
Payer: COMMERCIAL

## 2023-01-04 ENCOUNTER — TELEPHONE (OUTPATIENT)
Dept: INTERNAL MEDICINE CLINIC | Facility: CLINIC | Age: 68
End: 2023-01-04

## 2023-01-04 VITALS
HEART RATE: 90 BPM | SYSTOLIC BLOOD PRESSURE: 104 MMHG | RESPIRATION RATE: 14 BRPM | TEMPERATURE: 97.9 F | DIASTOLIC BLOOD PRESSURE: 61 MMHG

## 2023-01-04 PROCEDURE — G0299 HHS/HOSPICE OF RN EA 15 MIN: HCPCS

## 2023-01-04 PROCEDURE — 0651 HSPC ROUTINE HOME CARE

## 2023-01-04 PROCEDURE — G0155 HHCP-SVS OF CSW,EA 15 MIN: HCPCS

## 2023-01-04 ASSESSMENT — ENCOUNTER SYMPTOMS
DYSPNEA ACTIVITY LEVEL: AT REST
HEMOPTYSIS: 0

## 2023-01-04 NOTE — HOSPICE
Pt seen in home today for 24 hours post admit visit. Present for visit were pt, pt's sister/PCG, Madelin, and RN. RN greeted at door by Katlyn Echols. Pt resting comfortably in hospital bed. Alert and oriented x 4. Pleasant and engaging. Denies pain. Reports occasional SOB at rest and often after any movement/repositioning, etc. O2 at 2LPM as ordered. Comfort meds scheduled to arrive via [employer] today. RN educated Katlyn Echols and pt on use and indications. Full assessment perfomed, see ROS. Education provided, see POC. Pt and family reminded of 24/7 RN availability and encouraged to call [address] at any time with questions or concerns.

## 2023-01-04 NOTE — HOSPICE
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 COVID19 Screen 1/3/23 - Negative. DNR in process. Family will use [employer] out of [place] [employer]. ([de-identified] Brother works there)  Uzma Rosenbaum wishes to be Cremated. Uzma Rosenbaum is on O2 with a nasal cannula.  provided O2 Safety instruction with pt and niece expressing understanding. Uzma Rosenabum was alert and oriented appearing comfortable with no pain level expressed or understanding. Uzma Rosenbaum stated that he was not eating with only bits periodically that cause him pain. He shared that he is drinking a lot of water. Stated that he is sleeping well. Dog in home named Pramod. Dog barks and growls at first, then comes up wanting to be petted. Good natured after he gets use to you. Uzma Rosenbaum is legally  from a 5 yr marriage with no contact with ex. He has a girlfriend of 21 yrs, Shama Lyn, who lives in [place] [facility] [place] [facility] and is available when needed helping out some. No children. No siblings. Claims his niece, Romina Quesada, as his own. They have a great relationship and Romina Quesada is Madelin's daughter who is a Rn in [place], [place]. She was present for visit today. Uzma Rosenbaum is still employed with [employer] and has been working with them for the last 27 yrs. He works in the [employer]. Uzma Rosenbaum is of the [place] [employer] with roots in the Muslim/Christian josé luis traditions. He grew up in [place] in [employer] at that time Larissa Castillo was the . His mother was an Primitivo in that Adventist. Father was [facility] and a member of Neelam Coyle, pt's sister, is a member of [employer]. According to Uzma Rosenbaum, they will probably be using her Adventist/ when needed. Uzma Rosenbaum is able to talk about his journey and is at peace with his josé luis and where he is right now.   However, he is tearful when talking about death and shared that he and the family have had gatherings where they talked about things, were honest about what was taking place and all are on the same page. Rishi Polanco is in process of writing down his wishes to help out his family. He is a believer in [employer] and the basic tenets of the [employer]. Rishi Polanco stated that he is an avid football fan and loves [address] especially in the [place] years and also [address] in the [facility] years.  provided spiritual care through pastoral conversation, active listening, supportive responses, scripture reading (Ps 121) and prayer.  Services/spiritual care is welcome into the home.  will provide spiritual care through pastoral conversation, active listening, supportive responses, scripture reading, and prayer as needed/requested according to their josé luis tradition at the rate of 1x per month plus 2 prns.  has read and agrees with the POC. Next regular scheduled visit is set for Wednesday, February 1 btw 12/2.

## 2023-01-05 ENCOUNTER — HOME CARE VISIT (OUTPATIENT)
Dept: SCHEDULING | Facility: HOME HEALTH | Age: 68
End: 2023-01-05
Payer: COMMERCIAL

## 2023-01-05 PROCEDURE — 0651 HSPC ROUTINE HOME CARE

## 2023-01-05 PROCEDURE — G0156 HHCP-SVS OF AIDE,EA 15 MIN: HCPCS

## 2023-01-05 ASSESSMENT — ENCOUNTER SYMPTOMS: HEMOPTYSIS: 0

## 2023-01-05 NOTE — HOSPICE
Pt is 78 y.o.male with a primary dx of Acute Chronic right and left ventricular systolic dysfunction associated CHF. Pt lives with his siter whom is primary caregiver and assist with any needs. Spoke with pt regarding community resources and role of . Pt is bedbound and requires moderate assistance. Pt has DME and no other needed supplies at this time. Spoke of end of life care and plans are to remain home. Pt states he has no children and depends on his neices and nephew to assist with all needs. Pt states he is eating bites and drinking sips. Sister had bought some nutritional drinks which pt enjoys. Overall,pt and family managing at this time. Reviewed emergency careplan and encouraged to contact hospice as needed. All voiced understanding.

## 2023-01-06 PROCEDURE — 0651 HSPC ROUTINE HOME CARE

## 2023-01-07 PROCEDURE — 0651 HSPC ROUTINE HOME CARE

## 2023-01-08 PROCEDURE — 0651 HSPC ROUTINE HOME CARE

## 2023-01-09 ENCOUNTER — HOME CARE VISIT (OUTPATIENT)
Dept: SCHEDULING | Facility: HOME HEALTH | Age: 68
End: 2023-01-09
Payer: COMMERCIAL

## 2023-01-09 PROCEDURE — G0156 HHCP-SVS OF AIDE,EA 15 MIN: HCPCS

## 2023-01-09 PROCEDURE — 0651 HSPC ROUTINE HOME CARE

## 2023-01-10 PROCEDURE — 0651 HSPC ROUTINE HOME CARE

## 2023-01-10 PROCEDURE — 2500000001 HSPC NON INJECTABLE MED

## 2023-01-11 ENCOUNTER — HOME CARE VISIT (OUTPATIENT)
Dept: SCHEDULING | Facility: HOME HEALTH | Age: 68
End: 2023-01-11
Payer: COMMERCIAL

## 2023-01-11 VITALS
SYSTOLIC BLOOD PRESSURE: 138 MMHG | TEMPERATURE: 98.2 F | RESPIRATION RATE: 22 BRPM | DIASTOLIC BLOOD PRESSURE: 99 MMHG | HEART RATE: 78 BPM

## 2023-01-11 PROCEDURE — 0651 HSPC ROUTINE HOME CARE

## 2023-01-11 PROCEDURE — G0299 HHS/HOSPICE OF RN EA 15 MIN: HCPCS

## 2023-01-11 ASSESSMENT — ENCOUNTER SYMPTOMS: DYSPNEA ACTIVITY LEVEL: AT REST

## 2023-01-11 NOTE — HOSPICE
Pt seen in home today for routine visit. Present for visit were pt, pt's sister/PCG, Маринаa, pt's niece, and RN. RN greeted at door by Katlyn Echols. Pt resting comfortably in hospital bed. Alert and oriented x 4. Pleasant and engaging. Denies pain. Reports occasional SOB at rest and often after any movement/repositioning, etc. O2 at 2LPM as ordered. RN performed orthostatic BP assessment. See vitals. Pt's BP increased with the change in position from supine to sitting upright. Full assessment perfomed, see ROS. Education provided, see POC. Pt and family reminded of 24/7 RN availability and encouraged to call Matteo G[facility]mez at any time with questions or concerns.

## 2023-01-12 ENCOUNTER — HOME CARE VISIT (OUTPATIENT)
Dept: SCHEDULING | Facility: HOME HEALTH | Age: 68
End: 2023-01-12
Payer: COMMERCIAL

## 2023-01-12 PROCEDURE — 0651 HSPC ROUTINE HOME CARE

## 2023-01-12 PROCEDURE — G0156 HHCP-SVS OF AIDE,EA 15 MIN: HCPCS

## 2023-01-13 PROCEDURE — 0651 HSPC ROUTINE HOME CARE

## 2023-01-14 PROCEDURE — 0651 HSPC ROUTINE HOME CARE

## 2023-01-15 PROCEDURE — 0651 HSPC ROUTINE HOME CARE

## 2023-01-16 ENCOUNTER — HOME CARE VISIT (OUTPATIENT)
Dept: SCHEDULING | Facility: HOME HEALTH | Age: 68
End: 2023-01-16
Payer: COMMERCIAL

## 2023-01-16 PROCEDURE — G0156 HHCP-SVS OF AIDE,EA 15 MIN: HCPCS

## 2023-01-16 PROCEDURE — 0651 HSPC ROUTINE HOME CARE

## 2023-01-17 ENCOUNTER — HOME CARE VISIT (OUTPATIENT)
Dept: SCHEDULING | Facility: HOME HEALTH | Age: 68
End: 2023-01-17
Payer: COMMERCIAL

## 2023-01-17 VITALS
HEART RATE: 67 BPM | DIASTOLIC BLOOD PRESSURE: 51 MMHG | TEMPERATURE: 98.3 F | SYSTOLIC BLOOD PRESSURE: 97 MMHG | RESPIRATION RATE: 14 BRPM

## 2023-01-17 PROCEDURE — 0651 HSPC ROUTINE HOME CARE

## 2023-01-17 PROCEDURE — G0299 HHS/HOSPICE OF RN EA 15 MIN: HCPCS

## 2023-01-17 ASSESSMENT — ENCOUNTER SYMPTOMS: HEMOPTYSIS: 0

## 2023-01-17 NOTE — HOSPICE
Pt seen in home today for routine visit. Present for visit were pt, pt's sister/PCG, Madelin, Dr. Suri Taylor, and RN. RN greeted at door by Katlyn Echols. Pt resting comfortably in hospital bed. Alert and oriented x 4. Pleasant and engaging. Denies pain. Reports occasional SOB at rest and often after any movement/repositioning, etc. Pt comfortable on RA during assessment. Full assessment perfomed, see ROS. Education provided, see POC. Multiple medication changes, see MAR. Cream and pads ordered via [facility]   Portable O2 tanks ordered via [facility]. Pt and family reminded of 24/7 RN availability and encouraged to call Matteo G[facility]mez at any time with questions or concerns.

## 2023-01-18 ENCOUNTER — HOME CARE VISIT (OUTPATIENT)
Dept: SCHEDULING | Facility: HOME HEALTH | Age: 68
End: 2023-01-18
Payer: COMMERCIAL

## 2023-01-18 PROCEDURE — G0156 HHCP-SVS OF AIDE,EA 15 MIN: HCPCS

## 2023-01-18 PROCEDURE — 0651 HSPC ROUTINE HOME CARE

## 2023-01-19 PROCEDURE — 0651 HSPC ROUTINE HOME CARE

## 2023-01-20 ENCOUNTER — HOME CARE VISIT (OUTPATIENT)
Dept: SCHEDULING | Facility: HOME HEALTH | Age: 68
End: 2023-01-20
Payer: COMMERCIAL

## 2023-01-20 PROCEDURE — 0651 HSPC ROUTINE HOME CARE

## 2023-01-21 PROCEDURE — 0651 HSPC ROUTINE HOME CARE

## 2023-01-22 PROCEDURE — 0651 HSPC ROUTINE HOME CARE

## 2023-01-23 ENCOUNTER — HOME CARE VISIT (OUTPATIENT)
Dept: SCHEDULING | Facility: HOME HEALTH | Age: 68
End: 2023-01-23
Payer: COMMERCIAL

## 2023-01-23 PROCEDURE — 2500000001 HSPC NON INJECTABLE MED

## 2023-01-23 PROCEDURE — G0156 HHCP-SVS OF AIDE,EA 15 MIN: HCPCS

## 2023-01-23 PROCEDURE — 0651 HSPC ROUTINE HOME CARE

## 2023-01-24 ENCOUNTER — HOME CARE VISIT (OUTPATIENT)
Dept: SCHEDULING | Facility: HOME HEALTH | Age: 68
End: 2023-01-24
Payer: COMMERCIAL

## 2023-01-24 VITALS
TEMPERATURE: 97.9 F | SYSTOLIC BLOOD PRESSURE: 95 MMHG | HEART RATE: 64 BPM | RESPIRATION RATE: 14 BRPM | DIASTOLIC BLOOD PRESSURE: 57 MMHG

## 2023-01-24 PROCEDURE — 0651 HSPC ROUTINE HOME CARE

## 2023-01-24 PROCEDURE — G0299 HHS/HOSPICE OF RN EA 15 MIN: HCPCS

## 2023-01-24 ASSESSMENT — ENCOUNTER SYMPTOMS
HEMOPTYSIS: 0
DYSPNEA ACTIVITY LEVEL: AFTER AMBULATING LESS THAN 10 FT

## 2023-01-24 NOTE — HOSPICE
Pt seen in home today for routine visit. Present for visit were pt, pt's sister/PCG, Madelin, and RN. RN greeted at door by Katlyn & Onesimo. Pt resting comfortably in hospital bed. Alert and oriented x 4. Pleasant and engaging. Denies pain. Reports occasional SOB at rest and often after any movement/repositioning, etc. Pt comfortable on 2L during assessment. Pt and PCG educated on advair, albuterol, and nystatin administration. Full assessment perfomed, see ROS. Education provided, see POC. No medication/DME/supply needs expressed. Pt and family reminded of 24/7 RN availability and encouraged to call [address] at any time with questions or concerns.

## 2023-01-25 ENCOUNTER — HOME CARE VISIT (OUTPATIENT)
Dept: SCHEDULING | Facility: HOME HEALTH | Age: 68
End: 2023-01-25
Payer: COMMERCIAL

## 2023-01-25 PROCEDURE — G0156 HHCP-SVS OF AIDE,EA 15 MIN: HCPCS

## 2023-01-25 PROCEDURE — 0651 HSPC ROUTINE HOME CARE

## 2023-01-26 PROCEDURE — 0651 HSPC ROUTINE HOME CARE

## 2023-01-27 ENCOUNTER — HOME CARE VISIT (OUTPATIENT)
Dept: SCHEDULING | Facility: HOME HEALTH | Age: 68
End: 2023-01-27
Payer: COMMERCIAL

## 2023-01-27 PROCEDURE — G0156 HHCP-SVS OF AIDE,EA 15 MIN: HCPCS

## 2023-01-27 PROCEDURE — 0651 HSPC ROUTINE HOME CARE

## 2023-01-28 PROCEDURE — 0651 HSPC ROUTINE HOME CARE

## 2023-01-29 PROCEDURE — 0651 HSPC ROUTINE HOME CARE

## 2023-01-30 ENCOUNTER — HOME CARE VISIT (OUTPATIENT)
Dept: SCHEDULING | Facility: HOME HEALTH | Age: 68
End: 2023-01-30
Payer: COMMERCIAL

## 2023-01-30 ENCOUNTER — HOME CARE VISIT (OUTPATIENT)
Dept: HOSPICE | Facility: HOSPICE | Age: 68
End: 2023-01-30
Payer: COMMERCIAL

## 2023-01-30 PROCEDURE — G0155 HHCP-SVS OF CSW,EA 15 MIN: HCPCS

## 2023-01-30 PROCEDURE — 0651 HSPC ROUTINE HOME CARE

## 2023-01-30 PROCEDURE — G0156 HHCP-SVS OF AIDE,EA 15 MIN: HCPCS

## 2023-01-31 PROCEDURE — 0651 HSPC ROUTINE HOME CARE

## 2023-02-01 ENCOUNTER — HOME CARE VISIT (OUTPATIENT)
Dept: SCHEDULING | Facility: HOME HEALTH | Age: 68
End: 2023-02-01
Payer: COMMERCIAL

## 2023-02-01 VITALS
TEMPERATURE: 96.9 F | SYSTOLIC BLOOD PRESSURE: 91 MMHG | RESPIRATION RATE: 14 BRPM | DIASTOLIC BLOOD PRESSURE: 37 MMHG | HEART RATE: 61 BPM

## 2023-02-01 PROCEDURE — 0651 HSPC ROUTINE HOME CARE

## 2023-02-01 PROCEDURE — G0299 HHS/HOSPICE OF RN EA 15 MIN: HCPCS

## 2023-02-01 PROCEDURE — G0156 HHCP-SVS OF AIDE,EA 15 MIN: HCPCS

## 2023-02-01 ASSESSMENT — ENCOUNTER SYMPTOMS
HEMOPTYSIS: 0
HEMOPTYSIS: 0

## 2023-02-01 NOTE — HOSPICE
Routine visit made to assess needs and follow up with any needs or concerns. Pt home alone at this time. Sister has returned back to work. Pt's neighbor and friends assist with his care until sister returns from work. Pt verbalized that he is managig at this time. No immediate concerns regarding pain or discomfort. Pt states he is eating/drinking well at this time. Advised of resources to assist with pt's care during the sister work hours. LMSW assisted pt with contacting [facility] on [employer] to apply for caregiver voucher. Pt called and left information and expecting a call back. Encouraged pt to vent feelings and offered emotional support. Reviewed emergency careplan and encouraged to contact hospice as needed. All voiced understanding.

## 2023-02-01 NOTE — HOSPICE
Colleen Adam was laying in his hospice bed in the living room of the apartment. Colleen Adam was alert, verbal, and appeared comfortable with no pain level expressed or observed. While  was present, the next door neighbor came in and brought Colleen Adam a bag of food from [employer] and a beverage. Edin drank from beverage but looked in the bad smelled it and placed it on the bedside table. The neighbor had left to  someone and was very nice a attentive to the pt. Colleen Adam stated that his appetite has declined for the last two weeks but he thinks it is getting better saying that over the last two weeks he would eat a bite or two of food only. Shared that often he would smell food that earlier he wanted but then after the smell would not want it anymore. Colleen Adam state that he loves rajwinder hair pasta and he and his sister would mix a can of soup with it and it would be great. Stated that they did that a couple of days ago and he ate two bowls of the soup/pasta mixture. Colleen Adam further shared that he is sleeping about half a day and that it would be mixed between sleeping at night or at day. Stated state that he does not have any pain. Colleen Adam was upbeat and also spoke of family and friends that visit. Shared his josé luis and how he sees heaven.  and Colleen Adam spoke of his journey and Colleen Adam acknowledged that he believed in [address] and through [employer], he knows where he is going. We spoke of the journey and him gaining strength and comfort for the journey and God being with him in the journey. Colleen Adam stated he has not needs or concerns and he is comfortable and at peace with his journey and where he is going.  provided spiritual care through pastoral conversation, active listening, supportive responses, scripture reading (Hitesh 43: 1 - 4) and prayer. Next regular scheduled visit is set for Wednesday, March 1 bvtw 12/2.

## 2023-02-02 PROBLEM — R77.8 ELEVATED TROPONIN: Status: RESOLVED | Noted: 2023-01-01 | Resolved: 2023-01-01

## 2023-02-02 PROBLEM — R79.89 ELEVATED TROPONIN: Status: RESOLVED | Noted: 2023-01-01 | Resolved: 2023-01-01

## 2023-02-02 PROCEDURE — 0651 HSPC ROUTINE HOME CARE

## 2023-02-03 ENCOUNTER — HOME CARE VISIT (OUTPATIENT)
Dept: SCHEDULING | Facility: HOME HEALTH | Age: 68
End: 2023-02-03
Payer: COMMERCIAL

## 2023-02-03 PROCEDURE — 0651 HSPC ROUTINE HOME CARE

## 2023-02-03 PROCEDURE — G0156 HHCP-SVS OF AIDE,EA 15 MIN: HCPCS

## 2023-02-04 PROCEDURE — 0651 HSPC ROUTINE HOME CARE

## 2023-02-05 PROCEDURE — 0651 HSPC ROUTINE HOME CARE

## 2023-02-06 NOTE — HOSPICE
Read Olive family called and stated that patient was not breathing. SN arrived. Gabby Roques was absent of vital signs. Jerri Huber RN verified that Angi Stuart had  at 1014 pm. Dr. Terrell Gilbert was notified of patient's death. Isabel Thomas family was present and coping well. SN called [facility]. SN called [address] to have equipment picked up. SN bathed patient. All meds were disposed of by SN and patient's family using a drug buster per hospital protocol. Email sent to [facility] staff about patient's death. [facility]  was faxed notification of patient's death. Instructed family to call [facility] with any questions.

## (undated) DEVICE — DRAIN SURG PENROSE 0.25X12 IN CLOSED WND DRAINAGE PREM SIL

## (undated) DEVICE — SUTURE VCRL SZ 3-0 L27IN ABSRB UD L26MM CT-2 1/2 CIR J232H

## (undated) DEVICE — NEEDLE HYPO 21GA L1.5IN INTRAMUSCULAR S STL LATCH BVL UP

## (undated) DEVICE — MOUTHPIECE ENDOSCP 20X27MM --

## (undated) DEVICE — CATHETER COR DIAG JUDKINS R 5.0 CRV 6FR 100CM 0 SIDE H

## (undated) DEVICE — Device

## (undated) DEVICE — KENDALL RADIOLUCENT FOAM MONITORING ELECTRODE RECTANGULAR SHAPE: Brand: KENDALL

## (undated) DEVICE — GLOVE SURG SZ 8 CRM LTX FREE POLYISOPRENE POLYMER BEAD ANTI

## (undated) DEVICE — CATHETER GUID EXTRA BACKUP 3.5 0.070IN 6FR 100CM VISTA BRITE TIP

## (undated) DEVICE — BRONCHOSCOPY PACK: Brand: MEDLINE INDUSTRIES, INC.

## (undated) DEVICE — SHEET, T, LAPAROTOMY, STERILE: Brand: MEDLINE

## (undated) DEVICE — TUBING, SUCTION, 3/16" X 10', STRAIGHT: Brand: MEDLINE

## (undated) DEVICE — CATH 6F 110CM PG145 -- DXTERITY

## (undated) DEVICE — PAD,NON-ADHERENT,3X8,STERILE,LF,1/PK: Brand: MEDLINE

## (undated) DEVICE — SINGLE USE BIOPSY VALVE MAJ-210: Brand: SINGLE USE BIOPSY VALVE (STERILE)

## (undated) DEVICE — 3M™ TEGADERM™ TRANSPARENT FILM DRESSING FRAME STYLE, 1626W, 4 IN X 4-3/4 IN (10 CM X 12 CM), 50/CT 4CT/CASE: Brand: 3M™ TEGADERM™

## (undated) DEVICE — GUIDEWIRE VASC J 3 CM 0.014 INX190 CM BAL MIDWT 1001780J

## (undated) DEVICE — STRIP,CLOSURE,WOUND,MEDI-STRIP,1/2X4: Brand: MEDLINE

## (undated) DEVICE — CATH 6F 100CM JL35 -- DXTERITY

## (undated) DEVICE — TR BAND RADIAL ARTERY COMPRESSION DEVICE: Brand: TR BAND

## (undated) DEVICE — SUTURE VCRL SZ 2-0 L27IN ABSRB UD L26MM CT-2 1/2 CIR J269H

## (undated) DEVICE — MASTISOL ADHESIVE LIQ 2/3ML

## (undated) DEVICE — GLIDESHEATH SLENDER STAINLESS STEEL KIT: Brand: GLIDESHEATH SLENDER

## (undated) DEVICE — SUTURE VCRL SZ 4-0 L18IN ABSRB UD L19MM PS-2 3/8 CIR PRIM J496H

## (undated) DEVICE — CORONARY IMAGING CATHETER: Brand: OPTICROSS™ HD

## (undated) DEVICE — Device: Brand: PROWATER

## (undated) DEVICE — APPLIER CLP L9.38IN M LIG TI DISP STR RNG HNDL LIGACLP

## (undated) DEVICE — SUTURE PROL SZ 2-0 L30IN NONABSORBABLE BLU L26MM CT-2 1/2 8411H

## (undated) DEVICE — SOLUTION IRRIG 1000ML 09% SOD CHL USP PIC PLAS CONTAINER

## (undated) DEVICE — GUIDEWIRE VASC L260CM DIA0.035IN TIP DIA3MM L7MM INTVASC S

## (undated) DEVICE — MINOR SPLIT GENERAL: Brand: MEDLINE INDUSTRIES, INC.

## (undated) DEVICE — SINGLE USE SUCTION VALVE MAJ-209: Brand: SINGLE USE SUCTION VALVE (STERILE)